# Patient Record
Sex: MALE | Race: BLACK OR AFRICAN AMERICAN | Employment: UNEMPLOYED | RURAL
[De-identification: names, ages, dates, MRNs, and addresses within clinical notes are randomized per-mention and may not be internally consistent; named-entity substitution may affect disease eponyms.]

---

## 2017-02-26 ENCOUNTER — TELEPHONE (OUTPATIENT)
Dept: FAMILY MEDICINE CLINIC | Age: 57
End: 2017-02-26

## 2017-02-26 RX ORDER — PENICILLIN V POTASSIUM 250 MG/1
500 TABLET, FILM COATED ORAL 2 TIMES DAILY
Qty: 28 TAB | Refills: 0 | Status: SHIPPED | OUTPATIENT
Start: 2017-02-26 | End: 2017-03-05

## 2017-02-26 NOTE — PROGRESS NOTES
Contacted by lupe. PT c/o left anterior lower jaw swelling, feels hard like a knot. No f/c, no swollen lymph nodes. Recommend starting pen VK and getting pt seen by dental. Called to JONNA.

## 2017-03-09 ENCOUNTER — OFFICE VISIT (OUTPATIENT)
Dept: FAMILY MEDICINE CLINIC | Age: 57
End: 2017-03-09

## 2017-03-09 VITALS
WEIGHT: 243 LBS | OXYGEN SATURATION: 96 % | HEART RATE: 94 BPM | TEMPERATURE: 96.7 F | DIASTOLIC BLOOD PRESSURE: 82 MMHG | RESPIRATION RATE: 16 BRPM | SYSTOLIC BLOOD PRESSURE: 120 MMHG | HEIGHT: 69 IN | BODY MASS INDEX: 35.99 KG/M2

## 2017-03-09 DIAGNOSIS — E11.9 CONTROLLED TYPE 2 DIABETES MELLITUS WITHOUT COMPLICATION, WITHOUT LONG-TERM CURRENT USE OF INSULIN (HCC): ICD-10-CM

## 2017-03-09 DIAGNOSIS — R20.0 NUMBNESS AND TINGLING OF RIGHT ARM: ICD-10-CM

## 2017-03-09 DIAGNOSIS — F33.0 DEPRESSION, MAJOR, RECURRENT, MILD (HCC): ICD-10-CM

## 2017-03-09 DIAGNOSIS — L73.1 INGROWING HAIR: ICD-10-CM

## 2017-03-09 DIAGNOSIS — E11.65 CONTROLLED TYPE 2 DIABETES MELLITUS WITH HYPERGLYCEMIA, WITH LONG-TERM CURRENT USE OF INSULIN (HCC): ICD-10-CM

## 2017-03-09 DIAGNOSIS — I10 ESSENTIAL HYPERTENSION: ICD-10-CM

## 2017-03-09 DIAGNOSIS — B19.20 HEPATITIS C VIRUS INFECTION, UNSPECIFIED CHRONICITY: ICD-10-CM

## 2017-03-09 DIAGNOSIS — F25.1 SCHIZOAFFECTIVE DISORDER, DEPRESSIVE TYPE (HCC): ICD-10-CM

## 2017-03-09 DIAGNOSIS — L02.02 BOIL, FACE: Primary | ICD-10-CM

## 2017-03-09 DIAGNOSIS — F20.3 UNDIFFERENTIATED SCHIZOPHRENIA (HCC): ICD-10-CM

## 2017-03-09 DIAGNOSIS — R20.2 NUMBNESS AND TINGLING OF RIGHT ARM: ICD-10-CM

## 2017-03-09 DIAGNOSIS — Z79.4 CONTROLLED TYPE 2 DIABETES MELLITUS WITH HYPERGLYCEMIA, WITH LONG-TERM CURRENT USE OF INSULIN (HCC): ICD-10-CM

## 2017-03-09 LAB
ALBUMIN UR QL STRIP: 150 MG/L
CREATININE, URINE POC: 300 MG/DL
MICROALBUMIN/CREAT RATIO POC: NORMAL MG/G

## 2017-03-09 RX ORDER — CEPHALEXIN 500 MG/1
1000 CAPSULE ORAL 2 TIMES DAILY
Qty: 28 CAP | Refills: 0 | Status: SHIPPED | OUTPATIENT
Start: 2017-03-09 | End: 2017-03-16

## 2017-03-09 RX ORDER — IRBESARTAN 300 MG/1
300 TABLET ORAL DAILY
Qty: 90 TAB | Refills: 2 | Status: SHIPPED | OUTPATIENT
Start: 2017-03-09 | End: 2017-04-07 | Stop reason: ALTCHOICE

## 2017-03-09 RX ORDER — ZIPRASIDONE HYDROCHLORIDE 20 MG/1
40 CAPSULE ORAL 2 TIMES DAILY WITH MEALS
Qty: 60 CAP | Refills: 11 | Status: SHIPPED | OUTPATIENT
Start: 2017-03-09 | End: 2017-04-07 | Stop reason: ALTCHOICE

## 2017-03-09 NOTE — PROGRESS NOTES
Gilford Eans is a 55 y.o. male presenting for/with:    Jaw Pain    HPI:  Knot on jaw  Got a little better with the penVK we ordered, but knot is still there and a little tender. No d/c, but has come to a small head    Hypertension. Blood pressures have been improving. Management at last visit included boosting regimen. Current regimen: ARB, CCB, thiazide. Symptoms include no sx Patient denies chest pain, palpitations, peripheral edema. Cough has resolved. Lab review:   Lab Results   Component Value Date/Time    Sodium 138 06/14/2016 11:40 AM    Potassium 4.5 06/14/2016 11:40 AM    Chloride 96 06/14/2016 11:40 AM    CO2 20 06/14/2016 11:40 AM    Anion gap 7 10/11/2015 09:37 PM    Glucose 199 06/14/2016 11:40 AM    BUN 17 06/14/2016 11:40 AM    Creatinine 0.95 06/14/2016 11:40 AM    BUN/Creatinine ratio 18 06/14/2016 11:40 AM    GFR est  06/14/2016 11:40 AM    GFR est non-AA 90 06/14/2016 11:40 AM    Calcium 9.8 06/14/2016 11:40 AM     Diabetes. Sugars controlled fairly Has been running low to mid 100's. We Boosted AM humalog mix 75/25 insulin to 50 units AM and con't  40 units QPM with dinner. Hypoglycemia: none  Tolerating current treatment well  Current medications include insulin, gluburide 2.5mg qam, metformin 1g BID, ASA 81mg every day.     Lab Results   Component Value Date/Time    Hemoglobin A1c 9.5 06/14/2016 11:40 AM    Hemoglobin A1c 6.3 01/29/2016 11:57 AM    Hemoglobin A1c 6.1 05/13/2015 05:55 AM    Glucose 199 06/14/2016 11:40 AM    Glucose (POC) 102 05/12/2015 04:33 PM    Glucose POC  12/16/2016 03:04 PM      Comment:      greater than 500    Microalb/Creat ratio (ug/mg creat.) 4.0 01/29/2016 11:58 AM    LDL, calculated 72.4 05/15/2015 05:14 AM    Creatinine 0.95 06/14/2016 11:40 AM     Lab Results   Component Value Date/Time    Microalb/Creat ratio (ug/mg creat.) 4.0 01/29/2016 11:58 AM    Microalbumin, urine 5.2 01/29/2016 11:58 AM     Last eye exam performed  greather than 1 year, currently incarcerated. Last foot exam  performed 12/2016, nl MF, no sores. Schizophrenia  We changed from zyprexa to geodon 20mg BID last visit due to very high sugar, in a cross titration, and added divalproex 250mg BID. Racquel well, and has been feeling better from sugar standpoint,but more depressed lately. Still hearing voices several days a week, sometimes telling him to hurt himself. Hep C POS  Lab Results   Component Value Date/Time    ALT (SGPT) 18 06/14/2016 11:40 AM    AST (SGOT) 10 06/14/2016 11:40 AM    Alk. phosphatase 85 06/14/2016 11:40 AM    Bilirubin, total <0.2 06/14/2016 11:40 AM     PMH, SH, Medications/Allergies: reviewed, on chart. ROS:  Constitutional: No fever, chills or weight loss  Respiratory: No cough, SOB   CV: No chest pain or Palpitations    Visit Vitals    /82 (BP 1 Location: Right arm, BP Patient Position: Sitting)    Pulse 94    Temp 96.7 °F (35.9 °C) (Temporal)    Resp 16    Ht 5' 9\" (1.753 m)    Wt 243 lb (110.2 kg)    SpO2 96%    BMI 35.88 kg/m2     Wt Readings from Last 3 Encounters:   03/09/17 243 lb (110.2 kg)   12/16/16 221 lb (100.2 kg)   06/14/16 224 lb (101.6 kg)     Physical Examination: General appearance - alert, well appearing, and in no distress  Mental status - alert, oriented to person, place, and time  Eyes - pupils equal and reactive, extraocular eye movements intact  ENT - bilateral external ears and nose normal. Normal lips. R lower cheek with a 1cm subcutaneous tender nodule with a small punctum present. No gum lesion or ttp to the gumline R lower jaw. Mostly edentulous.   Neck - supple, no significant adenopathy, no thyromegaly or mass  Lymphatics - no palpable lymphadenopathy, no hepatosplenomegaly  Chest - clear to auscultation, no wheezes, rales or rhonchi, symmetric air entry  Heart - normal rate, regular rhythm, normal S1, S2, no murmurs, rubs, clicks or gallops  Extremities - peripheral pulses normal, no pedal edema, no clubbing or cyanosis. A/P:  R cheek mass   Tender, swollen. Better after course of PCN. Has a draining punctum, so suspect a cyst or ingrowing hair with inflammation/infection. Try course of keflex    Schizophrenia  Not well controlled, with more voices. Boost geodon to 40mg BID. Check labs (LFT in CMP and CBC). DM2  Good control on sugar log. con't current regimen. Recheck A1c, deshawn/cr, and lytes and GFR in CMP. HTN  In goal. con't current tx. Hx hep c  Pt plans to address once his incarceration completes or if shows signs of active disease. LFT's ok on last check.

## 2017-03-09 NOTE — MR AVS SNAPSHOT
Visit Information Date & Time Provider Department Dept. Phone Encounter #  
 3/9/2017 10:50 AM Leopoldo Dandy, MD [address] [place] [address] [id number] Follow-up Instructions Return in about 3 months (around 6/9/2017). Follow-up and Disposition History Upcoming Health Maintenance Date Due  
 FOOT EXAM Q1 8/6/1971 EYE EXAM RETINAL OR DILATED Q1 8/6/1971 DTaP/Tdap/Td series (1 - Tdap) 8/6/1982 FOBT Q 1 YEAR AGE 50-75 8/6/2011 LIPID PANEL Q1 5/15/2016 INFLUENZA AGE 9 TO ADULT 8/1/2016 HEMOGLOBIN A1C Q6M 12/14/2016 MICROALBUMIN Q1 1/29/2017 Allergies as of 3/9/2017  Review Complete On: 3/9/2017 By: Leopoldo Dandy, MD  
  
 Severity Noted Reaction Type Reactions Lisinopril  05/12/2015   Side Effect Cough Developed cough while taking lisinopril Current Immunizations  Never Reviewed Name Date Influenza Vaccine PF 12/2/2014  9:02 AM  
 Pneumococcal Polysaccharide (PPSV-23) 12/2/2014  8:59 AM  
  
 Not reviewed this visit You Were Diagnosed With   
  
 Codes Comments Boil, face    -  Primary ICD-10-CM: L02.02 
ICD-9-CM: 680.0 Depression, major, recurrent, mild ([facility]     ICD-10-CM: F33.0 ICD-9-CM: 296.31 Controlled type 2 diabetes mellitus with hyperglycemia, with long-term current use of insulin (HCC)     ICD-10-CM: E11.65, Z79.4 ICD-9-CM: 250.80, 790.29, V58.67 Essential hypertension     ICD-10-CM: I10 
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  
 35.88 kg/m 2 2.32 m 2 Preferred Pharmacy Pharmacy Name Phone [phone] [place], [place] - [address] Your Updated Medication List  
  
   
This list is accurate as of: 3/9/17 12:57 PM.  Always use your most recent med list. amLODIPine 5 mg tablet Commonly known as:  Tad Mee Take 1 Tab by mouth daily. Indications: pressure  
  
 aspirin delayed-release 81 mg tablet Take 1 Tab by mouth daily. Indications: prevent heart problem  
  
 cephALEXin 500 mg capsule Commonly known as:  Juanetta Muzzy Take 2 Caps by mouth two (2) times a day for 7 days. Indications: cheek bump, ingrown hair  
  
 clotrimazole 1 % topical cream  
Commonly known as:  Sadie Heads Apply  to affected area two (2) times a day. divalproex  mg tablet Commonly known as:  DEPAKOTE Take 1 Tab by mouth two (2) times a day. Indications: nerves  
  
 esomeprazole 40 mg capsule Commonly known as:  NEXIUM  
TAKE 1 CAP BY MOUTH DAILY. gabapentin 300 mg capsule Commonly known as:  NEURONTIN Take 1 Cap by mouth three (3) times daily. 2 tabs BID  Indications: nerve pain  
  
 glyBURIDE 2.5 mg tablet Commonly known as:  Jasmyne Mate Take 1 Tab by mouth Daily (before breakfast). Indications: sugar  
  
 hydroCHLOROthiazide 12.5 mg tablet Commonly known as:  HYDRODIURIL Take 1 Tab by mouth daily. Indications: pressure and fluid, add to amlodipine and irbesartan Insulin Lisp & Lisp Prot (Hum) 100 unit/mL (75-25) Inpn Commonly known as:  HumaLOG Mix 75-25 KwikPen 48 units AM and 40 units PM  
  
 Insulin Needles (Disposable) 31 gauge x 5/16\" Ndle Use to inject insulin twice daily  
  
 irbesartan 300 mg tablet Commonly known as:  AVAPRO Take 1 Tab by mouth daily. metFORMIN 1,000 mg tablet Commonly known as:  GLUCOPHAGE Take 1 Tab by mouth two (2) times daily (with meals).  Indications: type 2 diabetes mellitus  
  
 traZODone 50 mg tablet Commonly known as:  Nolberto Mel Take 1 Tab by mouth nightly. Indications: sleep  
  
 ziprasidone 20 mg capsule Commonly known as:  Faviola Mayers Take 2 Caps by mouth two (2) times daily (with meals). Indications: nerves, new higher dose Prescriptions Sent to Pharmacy Refills  
 irbesartan (AVAPRO) 300 mg tablet 2 Sig: Take 1 Tab by mouth daily. Class: Normal  
 [facility] Ph #: [phone] Route: Oral  
 ziprasidone (GEODON) 20 mg capsule 11 Sig: Take 2 Caps by mouth two (2) times daily (with meals). Indications: nerves, new higher dose Class: Normal  
 [facility] Ph #: [phone] Route: Oral  
 cephALEXin (KEFLEX) 500 mg capsule 0 Sig: Take 2 Caps by mouth two (2) times a day for 7 days. Indications: cheek bump, ingrown hair Class: Normal  
 [facility] Ph #: [phone] Route: Oral  
  
We Performed the Following AMB POC URINE, MICROALBUMIN, SEMIQUANT (3 RESULTS) [15404 CPT(R)] CBC WITH AUTOMATED DIFF [87259 CPT(R)] COLLECTION VENOUS BLOOD,VENIPUNCTURE [id number] CPT(R)] HEMOGLOBIN A1C WITH EAG [24514 CPT(R)] METABOLIC PANEL, COMPREHENSIVE [37631 CPT(R)] Follow-up Instructions Return in about 3 months (around 6/9/2017). Introducing [facility] & HEALTH SERVICES! Melissa Rose introduces [employer] patient portal. Now you can access parts of your medical record, email your doctor's office, and request medication refills online. 1. In your internet browser, go to [email or web address] 2. Click on the First Time User? Click Here link in the Sign In box. You will see the New Member Sign Up page. 3. Enter your [employer]PRINT Access Code exactly as it appears below.  You will not need to use this code after youve completed the sign-up process. If you do not sign up before the expiration date, you must request a new code. · [employer] Access Code: [email or web address] Expires: 3/16/2017  1:26 PM 
 
4. Enter the last four digits of your Social Security Number (xxxx) and Date of Birth (mm/dd/yyyy) as indicated and click Submit. You will be taken to the next sign-up page. 5. Create a [employer] ID. This will be your [employer] login ID and cannot be changed, so think of one that is secure and easy to remember. 6. Create a [employer] password. You can change your password at any time. 7. Enter your Password Reset Question and Answer. This can be used at a later time if you forget your password. 8. Enter your e-mail address. You will receive e-mail notification when new information is available in [address] 9. Click Sign Up. You can now view and download portions of your medical record. 10. Click the Download Summary menu link to download a portable copy of your medical information. If you have questions, please visit the Frequently Asked Questions section of the [employer] website. Remember, [employer] is NOT to be used for urgent needs. For medical emergencies, dial 911. Now available from your iPhone and Android! Please provide this summary of care documentation to your next provider. Your primary care clinician is listed as John Paul Vazquez. If you have any questions after today's visit, please call [phone].

## 2017-03-10 LAB
ALBUMIN SERPL-MCNC: 4.7 G/DL (ref 3.5–5.5)
ALBUMIN/GLOB SERPL: 1.7 {RATIO} (ref 1.1–2.5)
ALP SERPL-CCNC: 44 IU/L (ref 39–117)
ALT SERPL-CCNC: 19 IU/L (ref 0–44)
AST SERPL-CCNC: 15 IU/L (ref 0–40)
BASOPHILS # BLD AUTO: 0.1 X10E3/UL (ref 0–0.2)
BASOPHILS NFR BLD AUTO: 1 %
BILIRUB SERPL-MCNC: <0.2 MG/DL (ref 0–1.2)
BUN SERPL-MCNC: 19 MG/DL (ref 6–24)
BUN/CREAT SERPL: 18 (ref 9–20)
CALCIUM SERPL-MCNC: 9.6 MG/DL (ref 8.7–10.2)
CHLORIDE SERPL-SCNC: 99 MMOL/L (ref 96–106)
CO2 SERPL-SCNC: 18 MMOL/L (ref 18–29)
CREAT SERPL-MCNC: 1.03 MG/DL (ref 0.76–1.27)
EOSINOPHIL # BLD AUTO: 0.3 X10E3/UL (ref 0–0.4)
EOSINOPHIL NFR BLD AUTO: 4 %
ERYTHROCYTE [DISTWIDTH] IN BLOOD BY AUTOMATED COUNT: 13.8 % (ref 12.3–15.4)
EST. AVERAGE GLUCOSE BLD GHB EST-MCNC: 214 MG/DL
GLOBULIN SER CALC-MCNC: 2.7 G/DL (ref 1.5–4.5)
GLUCOSE SERPL-MCNC: 72 MG/DL (ref 65–99)
HBA1C MFR BLD: 9.1 % (ref 4.8–5.6)
HCT VFR BLD AUTO: 39 % (ref 37.5–51)
HGB BLD-MCNC: 13.6 G/DL (ref 12.6–17.7)
IMM GRANULOCYTES # BLD: 0 X10E3/UL (ref 0–0.1)
IMM GRANULOCYTES NFR BLD: 0 %
LYMPHOCYTES # BLD AUTO: 3.6 X10E3/UL (ref 0.7–3.1)
LYMPHOCYTES NFR BLD AUTO: 51 %
MCH RBC QN AUTO: 28.8 PG (ref 26.6–33)
MCHC RBC AUTO-ENTMCNC: 34.9 G/DL (ref 31.5–35.7)
MCV RBC AUTO: 83 FL (ref 79–97)
MONOCYTES # BLD AUTO: 0.6 X10E3/UL (ref 0.1–0.9)
MONOCYTES NFR BLD AUTO: 8 %
MORPHOLOGY BLD-IMP: ABNORMAL
NEUTROPHILS # BLD AUTO: 2.6 X10E3/UL (ref 1.4–7)
NEUTROPHILS NFR BLD AUTO: 36 %
PLATELET # BLD AUTO: 243 X10E3/UL (ref 150–379)
POTASSIUM SERPL-SCNC: 4.1 MMOL/L (ref 3.5–5.2)
PROT SERPL-MCNC: 7.4 G/DL (ref 6–8.5)
RBC # BLD AUTO: 4.73 X10E6/UL (ref 4.14–5.8)
SODIUM SERPL-SCNC: 141 MMOL/L (ref 134–144)
WBC # BLD AUTO: 7.1 X10E3/UL (ref 3.4–10.8)

## 2017-04-07 RX ORDER — VALSARTAN AND HYDROCHLOROTHIAZIDE 80; 12.5 MG/1; MG/1
1 TABLET, FILM COATED ORAL DAILY
Qty: 30 TAB | Refills: 6 | Status: SHIPPED | OUTPATIENT
Start: 2017-04-07 | End: 2017-07-26 | Stop reason: DRUGHIGH

## 2017-04-07 RX ORDER — METFORMIN HYDROCHLORIDE 1000 MG/1
1000 TABLET ORAL 2 TIMES DAILY WITH MEALS
Qty: 60 TAB | Refills: 5 | Status: SHIPPED | OUTPATIENT
Start: 2017-04-07 | End: 2017-10-17 | Stop reason: SDUPTHER

## 2017-04-07 RX ORDER — OLANZAPINE 10 MG/1
10 TABLET ORAL
Qty: 30 TAB | Refills: 5 | Status: SHIPPED | OUTPATIENT
Start: 2017-04-07 | End: 2017-10-05 | Stop reason: SDUPTHER

## 2017-04-07 RX ORDER — GABAPENTIN 300 MG/1
300 CAPSULE ORAL 3 TIMES DAILY
Qty: 90 CAP | Refills: 5 | Status: SHIPPED | OUTPATIENT
Start: 2017-04-07 | End: 2017-10-11 | Stop reason: SDUPTHER

## 2017-04-07 RX ORDER — DIPHENHYDRAMINE HCL 50 MG
50 CAPSULE ORAL
Qty: 30 CAP | Refills: 6 | Status: SHIPPED | OUTPATIENT
Start: 2017-04-07 | End: 2017-04-12 | Stop reason: SDUPTHER

## 2017-04-12 DIAGNOSIS — F25.1 SCHIZOAFFECTIVE DISORDER, DEPRESSIVE TYPE (HCC): ICD-10-CM

## 2017-04-12 DIAGNOSIS — R20.2 NUMBNESS AND TINGLING OF RIGHT ARM: ICD-10-CM

## 2017-04-12 DIAGNOSIS — R20.0 NUMBNESS AND TINGLING OF RIGHT ARM: ICD-10-CM

## 2017-04-12 RX ORDER — DIPHENHYDRAMINE HCL 50 MG
50 CAPSULE ORAL
Qty: 30 CAP | Refills: 6 | Status: SHIPPED | OUTPATIENT
Start: 2017-04-12 | End: 2018-01-08 | Stop reason: SDUPTHER

## 2017-06-02 DIAGNOSIS — I10 ESSENTIAL HYPERTENSION: ICD-10-CM

## 2017-06-02 DIAGNOSIS — F33.0 DEPRESSION, MAJOR, RECURRENT, MILD (HCC): ICD-10-CM

## 2017-06-02 RX ORDER — DIVALPROEX SODIUM 250 MG/1
250 TABLET, DELAYED RELEASE ORAL 2 TIMES DAILY
Qty: 60 TAB | Refills: 5 | Status: SHIPPED | OUTPATIENT
Start: 2017-06-02 | End: 2017-11-28 | Stop reason: SDUPTHER

## 2017-06-02 RX ORDER — AMLODIPINE BESYLATE 5 MG/1
5 TABLET ORAL DAILY
Qty: 30 TAB | Refills: 6 | Status: SHIPPED | OUTPATIENT
Start: 2017-06-02 | End: 2017-12-26 | Stop reason: SDUPTHER

## 2017-07-26 ENCOUNTER — TELEPHONE (OUTPATIENT)
Dept: FAMILY MEDICINE CLINIC | Age: 57
End: 2017-07-26

## 2017-07-26 DIAGNOSIS — I10 ESSENTIAL HYPERTENSION: ICD-10-CM

## 2017-07-26 DIAGNOSIS — E11.65 CONTROLLED TYPE 2 DIABETES MELLITUS WITH HYPERGLYCEMIA, WITH LONG-TERM CURRENT USE OF INSULIN (HCC): Primary | ICD-10-CM

## 2017-07-26 DIAGNOSIS — Z79.4 CONTROLLED TYPE 2 DIABETES MELLITUS WITH HYPERGLYCEMIA, WITH LONG-TERM CURRENT USE OF INSULIN (HCC): Primary | ICD-10-CM

## 2017-07-26 RX ORDER — VALSARTAN AND HYDROCHLOROTHIAZIDE 160; 12.5 MG/1; MG/1
1 TABLET, FILM COATED ORAL DAILY
Qty: 30 TAB | Refills: 11 | Status: SHIPPED | OUTPATIENT
Start: 2017-07-26 | End: 2020-04-01 | Stop reason: DRUGHIGH

## 2017-07-26 NOTE — TELEPHONE ENCOUNTER
Simeon pt. Sugar log reviewed. High. Boost insulin to 47 units AM and 42 units PM. Pressure up. Boost valsartan HCT to 160/12.5 daily.

## 2017-10-11 DIAGNOSIS — E11.9 CONTROLLED TYPE 2 DIABETES MELLITUS WITHOUT COMPLICATION, WITHOUT LONG-TERM CURRENT USE OF INSULIN (HCC): ICD-10-CM

## 2017-10-11 DIAGNOSIS — Z79.4 CONTROLLED TYPE 2 DIABETES MELLITUS WITH HYPERGLYCEMIA, WITH LONG-TERM CURRENT USE OF INSULIN (HCC): Primary | ICD-10-CM

## 2017-10-11 DIAGNOSIS — B19.20 HEPATITIS C VIRUS INFECTION, UNSPECIFIED CHRONICITY: ICD-10-CM

## 2017-10-11 DIAGNOSIS — R20.2 NUMBNESS AND TINGLING OF RIGHT ARM: ICD-10-CM

## 2017-10-11 DIAGNOSIS — R20.0 NUMBNESS AND TINGLING OF RIGHT ARM: ICD-10-CM

## 2017-10-11 DIAGNOSIS — E11.65 CONTROLLED TYPE 2 DIABETES MELLITUS WITH HYPERGLYCEMIA, WITH LONG-TERM CURRENT USE OF INSULIN (HCC): Primary | ICD-10-CM

## 2017-10-12 ENCOUNTER — TELEPHONE (OUTPATIENT)
Dept: FAMILY MEDICINE CLINIC | Age: 57
End: 2017-10-12

## 2017-10-12 DIAGNOSIS — Z79.4 CONTROLLED TYPE 2 DIABETES MELLITUS WITH HYPERGLYCEMIA, WITH LONG-TERM CURRENT USE OF INSULIN (HCC): ICD-10-CM

## 2017-10-12 DIAGNOSIS — E11.65 CONTROLLED TYPE 2 DIABETES MELLITUS WITH HYPERGLYCEMIA, WITH LONG-TERM CURRENT USE OF INSULIN (HCC): ICD-10-CM

## 2017-10-12 RX ORDER — GABAPENTIN 300 MG/1
CAPSULE ORAL
Qty: 90 CAP | Refills: 5 | Status: SHIPPED | OUTPATIENT
Start: 2017-10-12 | End: 2017-11-10 | Stop reason: SDUPTHER

## 2017-10-12 NOTE — TELEPHONE ENCOUNTER
BP's and sugars reviewed. Most pressures in 556'Q systolic, pulses in the low 100's. Sugars high though, at low 200's in AM and high 200's to low 400's PM. Boost AM 70/30 insulin to 52 units and PM to 46 units.

## 2017-10-17 RX ORDER — METFORMIN HYDROCHLORIDE 1000 MG/1
1000 TABLET ORAL 2 TIMES DAILY WITH MEALS
Qty: 60 TAB | Refills: 5 | Status: SHIPPED | OUTPATIENT
Start: 2017-10-17 | End: 2020-03-02 | Stop reason: SDUPTHER

## 2017-11-10 ENCOUNTER — TELEPHONE (OUTPATIENT)
Dept: FAMILY MEDICINE CLINIC | Age: 57
End: 2017-11-10

## 2017-11-10 DIAGNOSIS — R20.2 NUMBNESS AND TINGLING OF RIGHT ARM: Primary | ICD-10-CM

## 2017-11-10 DIAGNOSIS — R20.0 NUMBNESS AND TINGLING OF RIGHT ARM: Primary | ICD-10-CM

## 2017-11-10 DIAGNOSIS — B19.20 HEPATITIS C VIRUS INFECTION, UNSPECIFIED CHRONICITY: ICD-10-CM

## 2017-11-10 RX ORDER — GABAPENTIN 300 MG/1
CAPSULE ORAL
Qty: 90 CAP | Refills: 5 | Status: SHIPPED | OUTPATIENT
Start: 2017-11-10 | End: 2018-02-05 | Stop reason: SDUPTHER

## 2017-11-28 DIAGNOSIS — F33.0 DEPRESSION, MAJOR, RECURRENT, MILD (HCC): ICD-10-CM

## 2017-11-28 RX ORDER — DIVALPROEX SODIUM 250 MG/1
TABLET, EXTENDED RELEASE ORAL
Qty: 60 TAB | Refills: 6 | Status: SHIPPED | OUTPATIENT
Start: 2017-11-28 | End: 2020-01-04

## 2017-11-29 ENCOUNTER — OFFICE VISIT (OUTPATIENT)
Dept: FAMILY MEDICINE CLINIC | Age: 57
End: 2017-11-29

## 2017-11-29 VITALS
SYSTOLIC BLOOD PRESSURE: 112 MMHG | HEART RATE: 99 BPM | TEMPERATURE: 98.2 F | DIASTOLIC BLOOD PRESSURE: 80 MMHG | BODY MASS INDEX: 37.92 KG/M2 | HEIGHT: 69 IN | OXYGEN SATURATION: 95 % | WEIGHT: 256 LBS

## 2017-11-29 DIAGNOSIS — I10 ESSENTIAL HYPERTENSION: ICD-10-CM

## 2017-11-29 DIAGNOSIS — H00.12 CHALAZION OF RIGHT LOWER EYELID: Primary | ICD-10-CM

## 2017-11-29 DIAGNOSIS — Z79.4 CONTROLLED TYPE 2 DIABETES MELLITUS WITHOUT COMPLICATION, WITH LONG-TERM CURRENT USE OF INSULIN (HCC): ICD-10-CM

## 2017-11-29 DIAGNOSIS — E78.2 MIXED HYPERLIPIDEMIA: ICD-10-CM

## 2017-11-29 DIAGNOSIS — E11.9 CONTROLLED TYPE 2 DIABETES MELLITUS WITHOUT COMPLICATION, WITH LONG-TERM CURRENT USE OF INSULIN (HCC): ICD-10-CM

## 2017-11-29 RX ORDER — OFLOXACIN 3 MG/ML
2 SOLUTION/ DROPS OPHTHALMIC 4 TIMES DAILY
Qty: 5 ML | Refills: 1 | Status: SHIPPED | OUTPATIENT
Start: 2017-11-29 | End: 2017-12-06

## 2017-11-29 NOTE — MR AVS SNAPSHOT
Visit Information Date & Time Provider Department Dept. Phone Encounter #  
 11/29/2017  2:40 PM Meenakshi Jeffries MD [address] [place] [address] [id number] Follow-up Instructions Return in about 6 months (around 5/29/2018). Follow-up and Disposition History Upcoming Health Maintenance Date Due  
 FOOT EXAM Q1 8/6/1971 EYE EXAM RETINAL OR DILATED Q1 8/6/1971 DTaP/Tdap/Td series (1 - Tdap) 8/6/1982 FOBT Q 1 YEAR AGE 50-75 8/6/2011 LIPID PANEL Q1 5/15/2016 Influenza Age 5 to Adult 8/1/2017 HEMOGLOBIN A1C Q6M 9/9/2017 MICROALBUMIN Q1 3/9/2018 Allergies as of 11/29/2017  Review Complete On: 11/29/2017 By: Joshua Benton LPN Severity Noted Reaction Type Reactions Lisinopril  05/12/2015   Side Effect Cough Developed cough while taking lisinopril Current Immunizations  Never Reviewed Name Date Influenza Vaccine PF 12/2/2014  9:02 AM  
 Pneumococcal Polysaccharide (PPSV-23) 12/2/2014  8:59 AM  
  
 Not reviewed this visit You Were Diagnosed With   
  
 Codes Comments Chalazion of right lower eyelid    -  Primary ICD-10-CM: H00.12 ICD-9-CM: 373.2 Essential hypertension     ICD-10-CM: I10 
ICD-9-CM: 401.9 Controlled type 2 diabetes mellitus without complication, with long-term current use of insulin (HCC)     ICD-10-CM: E11.9, Z79.4 ICD-9-CM: 250.00, V58.67 Mixed hyperlipidemia     ICD-10-CM: E78.2 ICD-9-CM: 272.2 Vitals BP Pulse Temp Height(growth percentile) Weight(growth percentile) SpO2  
 112/80 99 98.2 °F (36.8 °C) (Oral) 5' 9\" (1.753 m) 256 lb (116.1 kg) 95% BMI Smoking Status 37.8 kg/m2 Former Smoker BMI and BSA Data Body Mass Index Body Surface Area  
 37.8 kg/m 2 2.38 m 2 Preferred Pharmacy Pharmacy Name Phone [phone] [place], [place] - [address] Your Updated Medication List  
  
   
 This list is accurate as of: 11/29/17  3:11 PM.  Always use your most recent med list. amLODIPine 5 mg tablet Commonly known as:  Elyn Fawader Take 1 Tab by mouth daily. Indications: pressure  
  
 aspirin delayed-release 81 mg tablet Take 1 Tab by mouth daily. Indications: prevent heart problem  
  
 clotrimazole 1 % topical cream  
Commonly known as:  Precilla Nena Apply  to affected area two (2) times a day. diphenhydrAMINE 50 mg capsule Commonly known as:  BENADRYL Take 1 Cap by mouth nightly. Indications: sleep  
  
 divalproex  mg ER tablet Commonly known as:  DEPAKOTE ER  
TAKE 1 TAB BY MOUTH TWO (2) TIMES A DAY. INDICATIONS: NERVES  
  
 esomeprazole 40 mg capsule Commonly known as:  NEXIUM  
TAKE 1 CAP BY MOUTH DAILY. gabapentin 300 mg capsule Commonly known as:  NEURONTIN  
TAKE 1 CAP BY MOUTH THREE (3) TIMES DAILY. INDICATIONS: NERVE PAIN Insulin Needles (Disposable) 31 gauge x 5/16\" Ndle Use to inject insulin twice daily  
  
 insulin NPH/insulin regular 100 unit/mL (70-30) injection Commonly known as:  NOVOLIN 70/30, HUMULIN 70/30  
52 units AM and 46 units with dinner for sugar diabetes. metFORMIN 1,000 mg tablet Commonly known as:  GLUCOPHAGE Take 1 Tab by mouth two (2) times daily (with meals). Indications: type 2 diabetes mellitus  
  
 ofloxacin 0.3 % ophthalmic solution Commonly known as:  FLOXIN Administer 2 Drops to right eye four (4) times daily for 7 days. Indications: infected cyst  
  
 OLANZapine 10 mg tablet Commonly known as:  ZyPREXA  
TAKE 1 TAB BY MOUTH NIGHTLY. INDICATIONS: NERVES/SCHIZOAFFECTIVE  
  
 valsartan-hydroCHLOROthiazide 160-12.5 mg per tablet Commonly known as:  DIOVAN-HCT Take 1 Tab by mouth daily. Prescriptions Sent to Pharmacy Refills  
 ofloxacin (FLOXIN) 0.3 % ophthalmic solution 1 Sig: Administer 2 Drops to right eye four (4) times daily for 7 days. Indications: infected cyst  
 Class: Normal  
 [facility]  #: [phone] Route: Right Eye We Performed the Following HEMOGLOBIN A1C WITH EAG [02079 CPT(R)]  DIABETES FOOT EXAM [HM7 Custom] LIPID PANEL [82260 CPT(R)] METABOLIC PANEL, BASIC [59673 CPT(R)] Follow-up Instructions Return in about 6 months (around 5/29/2018). Patient Instructions If you have any questions regarding Shipping Easy, you may call Shipping Easy support at [phone]. Introducing [facility] & HEALTH SERVICES! [employer] introduces [employer] patient portal. Now you can access parts of your medical record, email your doctor's office, and request medication refills online. 1. In your internet browser, go to [email or web address] 2. Click on the First Time User? Click Here link in the Sign In box. You will see the New Member Sign Up page. 3. Enter your [employer] Access Code exactly as it appears below. You will not need to use this code after youve completed the sign-up process. If you do not sign up before the expiration date, you must request a new code. · [employer] Access Code: [email or web address] Expires: 2/27/2018  3:09 PM 
 
4. Enter the last four digits of your Social Security Number (xxxx) and Date of Birth (mm/dd/yyyy) as indicated and click Submit. You will be taken to the next sign-up page. 5. Create a [employer] ID. This will be your [employer] login ID and cannot be changed, so think of one that is secure and easy to remember. 6. Create a [employer] password. You can change your password at any time. 7. Enter your Password Reset Question and Answer. This can be used at a later time if you forget your password. 8. Enter your e-mail address. You will receive e-mail notification when new information is available in [address] 9. Click Sign Up. You can now view and download portions of your medical record. 10. Click the Download Summary menu link to download a portable copy of your medical information. If you have questions, please visit the Frequently Asked Questions section of the [email or web address] website. Remember, [email or web address] is NOT to be used for urgent needs. For medical emergencies, dial 911. Now available from your iPhone and Android! Please provide this summary of care documentation to your next provider. Your primary care clinician is listed as Mabel Vazquez. If you have any questions after today's visit, please call [phone].

## 2017-11-30 LAB
BUN SERPL-MCNC: 14 MG/DL (ref 6–24)
BUN/CREAT SERPL: 15 (ref 9–20)
CALCIUM SERPL-MCNC: 10 MG/DL (ref 8.7–10.2)
CHLORIDE SERPL-SCNC: 96 MMOL/L (ref 96–106)
CHOLEST SERPL-MCNC: 219 MG/DL (ref 100–199)
CO2 SERPL-SCNC: 23 MMOL/L (ref 18–29)
CREAT SERPL-MCNC: 0.94 MG/DL (ref 0.76–1.27)
EST. AVERAGE GLUCOSE BLD GHB EST-MCNC: 229 MG/DL
GFR SERPLBLD CREATININE-BSD FMLA CKD-EPI: 104 ML/MIN/1.73
GFR SERPLBLD CREATININE-BSD FMLA CKD-EPI: 90 ML/MIN/1.73
GLUCOSE SERPL-MCNC: 145 MG/DL (ref 65–99)
HBA1C MFR BLD: 9.6 % (ref 4.8–5.6)
HDLC SERPL-MCNC: 30 MG/DL
LDLC SERPL CALC-MCNC: ABNORMAL MG/DL (ref 0–99)
POTASSIUM SERPL-SCNC: 3.9 MMOL/L (ref 3.5–5.2)
SODIUM SERPL-SCNC: 140 MMOL/L (ref 134–144)
TRIGL SERPL-MCNC: 453 MG/DL (ref 0–149)
VLDLC SERPL CALC-MCNC: ABNORMAL MG/DL (ref 5–40)

## 2017-11-30 NOTE — PROGRESS NOTES
Sugar not so goo. Boost insulin to 54 units AM and PM to 48 units. alf informed by [employer].
01/29/2016 11:58 AM     Last eye exam performed  More than 1 year ago. Currently incarcerated. Last foot exam performed 1 year ago. PMH, SH, Medications/Allergies: reviewed, on chart. ROS:  Constitutional: No fever, chills or weight loss  Respiratory: No cough, SOB   CV: No chest pain or Palpitations    Visit Vitals    /80  Comment: large cuff    Pulse 99    Temp 98.2 °F (36.8 °C) (Oral)    Ht 5' 9\" (1.753 m)    Wt 256 lb (116.1 kg)    SpO2 95%    BMI 37.8 kg/m2     Wt Readings from Last 3 Encounters:   11/29/17 256 lb (116.1 kg)   03/09/17 243 lb (110.2 kg)   12/16/16 221 lb (100.2 kg)   +13#  Physical Examination: General appearance - alert, well appearing, and in no distress  Mental status - alert, oriented to person, place, and time  Eyes - pupils equal and reactive, extraocular eye movements intact. L lower eyelid with 2mm tender papule just lateral to the tear duct, mildly erythematous, no d/c, no injection to conjunctiva. ENT - bilateral external ears and nose normal. Normal lips  Neck - supple, no significant adenopathy, no thyromegaly or mass  Lymphatics - no palpable lymphadenopathy, no hepatosplenomegaly  Chest - clear to auscultation, no wheezes, rales or rhonchi, symmetric air entry  Heart - normal rate, regular rhythm, normal S1, S2, no murmurs, rubs, clicks or gallops  Extremities - peripheral pulses normal, no pedal edema, no clubbing or cyanosis    A/P:  R lower inner lid with irritated chalazion. tx with floxin opthalmic 2gtts QID    DM2  wc by sugars. Check A1c. Adjust PRN    HTN  well controlled. con't current tx.  Check labs    HLD  Check lipids, adjust PRN

## 2017-12-26 ENCOUNTER — TELEPHONE (OUTPATIENT)
Dept: FAMILY MEDICINE CLINIC | Age: 57
End: 2017-12-26

## 2017-12-26 DIAGNOSIS — I10 ESSENTIAL HYPERTENSION: Primary | ICD-10-CM

## 2017-12-26 RX ORDER — AMLODIPINE BESYLATE 5 MG/1
5 TABLET ORAL DAILY
Qty: 30 TAB | Refills: 6 | Status: SHIPPED | OUTPATIENT
Start: 2017-12-26 | End: 2020-05-22 | Stop reason: SDUPTHER

## 2018-01-08 ENCOUNTER — TELEPHONE (OUTPATIENT)
Dept: FAMILY MEDICINE CLINIC | Age: 58
End: 2018-01-08

## 2018-01-08 DIAGNOSIS — F33.0 DEPRESSION, MAJOR, RECURRENT, MILD (HCC): Primary | ICD-10-CM

## 2018-01-08 DIAGNOSIS — R20.2 NUMBNESS AND TINGLING OF RIGHT ARM: ICD-10-CM

## 2018-01-08 DIAGNOSIS — R20.0 NUMBNESS AND TINGLING OF RIGHT ARM: ICD-10-CM

## 2018-01-08 DIAGNOSIS — F25.1 SCHIZOAFFECTIVE DISORDER, DEPRESSIVE TYPE (HCC): ICD-10-CM

## 2018-01-08 RX ORDER — DIPHENHYDRAMINE HCL 50 MG
50 CAPSULE ORAL
Qty: 30 CAP | Refills: 6 | Status: SHIPPED | OUTPATIENT
Start: 2018-01-08 | End: 2020-01-04

## 2018-02-05 ENCOUNTER — TELEPHONE (OUTPATIENT)
Dept: FAMILY MEDICINE CLINIC | Age: 58
End: 2018-02-05

## 2018-02-05 DIAGNOSIS — R20.0 NUMBNESS AND TINGLING OF RIGHT ARM: ICD-10-CM

## 2018-02-05 DIAGNOSIS — R20.2 NUMBNESS AND TINGLING OF RIGHT ARM: ICD-10-CM

## 2018-02-05 DIAGNOSIS — F25.1 SCHIZOAFFECTIVE DISORDER, DEPRESSIVE TYPE (HCC): Primary | ICD-10-CM

## 2018-02-05 DIAGNOSIS — B19.20 HEPATITIS C VIRUS INFECTION, UNSPECIFIED CHRONICITY: ICD-10-CM

## 2018-02-05 DIAGNOSIS — F33.0 DEPRESSION, MAJOR, RECURRENT, MILD (HCC): ICD-10-CM

## 2018-02-05 RX ORDER — OLANZAPINE 10 MG/1
TABLET ORAL
Qty: 30 TAB | Refills: 5 | Status: SHIPPED | OUTPATIENT
Start: 2018-02-05 | End: 2020-05-22 | Stop reason: ALTCHOICE

## 2018-02-05 RX ORDER — GABAPENTIN 300 MG/1
CAPSULE ORAL
Qty: 120 CAP | Refills: 5 | Status: SHIPPED | OUTPATIENT
Start: 2018-02-05 | End: 2020-03-02 | Stop reason: DRUGHIGH

## 2018-02-05 NOTE — TELEPHONE ENCOUNTER
Simeon pt. Needs RF olanzapine and gabapentin. Racquel well, working well. Also boosting gabapentin up. RF sent.

## 2019-05-22 ENCOUNTER — APPOINTMENT (OUTPATIENT)
Dept: CT IMAGING | Age: 59
End: 2019-05-22
Attending: EMERGENCY MEDICINE
Payer: MEDICARE

## 2019-05-22 ENCOUNTER — HOSPITAL ENCOUNTER (EMERGENCY)
Age: 59
Discharge: HOME | End: 2019-05-23
Attending: EMERGENCY MEDICINE
Payer: MEDICARE

## 2019-05-22 VITALS
DIASTOLIC BLOOD PRESSURE: 72 MMHG | RESPIRATION RATE: 16 BRPM | SYSTOLIC BLOOD PRESSURE: 128 MMHG | HEART RATE: 86 BPM | OXYGEN SATURATION: 99 % | TEMPERATURE: 98 F
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
## 2019-05-22 DIAGNOSIS — S20.221A CONTUSION OF RIGHT SIDE OF BACK, INITIAL ENCOUNTER: ICD-10-CM

## 2019-05-22 DIAGNOSIS — F10.10 ALCOHOL ABUSE: ICD-10-CM

## 2019-05-22 LAB
GLUCOSE BLD STRIP.AUTO-MCNC: 101 MG/DL (ref 65–100)
SERVICE CMNT-IMP: ABNORMAL

## 2019-05-22 PROCEDURE — 72192 CT PELVIS W/O DYE: CPT

## 2019-05-22 PROCEDURE — 99284 EMERGENCY DEPT VISIT MOD MDM: CPT

## 2019-05-22 PROCEDURE — 82962 GLUCOSE BLOOD TEST: CPT

## 2019-05-22 PROCEDURE — 70450 CT HEAD/BRAIN W/O DYE: CPT

## 2019-05-22 PROCEDURE — 72131 CT LUMBAR SPINE W/O DYE: CPT

## 2019-05-22 PROCEDURE — 72125 CT NECK SPINE W/O DYE: CPT

## 2019-05-23 RX ORDER — IBUPROFEN 800 MG/1
800 TABLET ORAL
Qty: 30 TAB | Refills: 0 | Status: SHIPPED | OUTPATIENT
Start: 2019-05-23 | End: 2020-03-18 | Stop reason: SDUPTHER

## 2019-05-23 NOTE — DISCHARGE INSTRUCTIONS
Patient Education        Acute Alcohol Intoxication: Care Instructions  Your Care Instructions    You have had treatment to help your body rid itself of alcohol. Too much alcohol upsets the body's fluid balance. Your doctor may have given you fluids and vitamins. For some people, drinking too much alcohol is a one-time event. For others, it is an ongoing problem. In either case, it is serious. It can be life-threatening. Follow-up care is a key part of your treatment and safety. Be sure to make and go to all appointments, and call your doctor if you are having problems. It's also a good idea to know your test results and keep a list of the medicines you take. How can you care for yourself at home? · Do not drink and drive. · Be safe with medicines. Take your medicines exactly as prescribed. Call your doctor if you think you are having a problem with your medicine. · Your doctor may have prescribed disulfiram (Antabuse). Do not drink any alcohol while you are taking this medicine. You may have severe or even life-threatening side effects from even small amounts of alcohol. · If you were given medicine to prevent nausea, be sure to take it exactly as prescribed. · Before you take any medicine, tell your doctor if:  ? You have had a bad reaction to any medicines in the past.  ? You are taking other medicines, including over-the-counter ones, or have other health problems. ? You are or could be pregnant. · Be prepared to have some symptoms of withdrawal in the next few days. · Drink plenty of liquids in the next few days. · Seek help if you need it to stop drinking. Getting counseling and joining a support group can help you stay sober. Try a support group such as Alcoholics Anonymous. · Avoid alcohol when you take medicines. It can react with many medicines and cause serious problems. When should you call for help? Call 911 anytime you think you may need emergency care.  For example, call if:    · You feel confused and are seeing things that are not there.     · You are thinking about killing yourself or hurting others.     · You have a seizure.     · You vomit blood or what looks like coffee grounds.    Call your doctor now or seek immediate medical care if:    · You have trembling, restlessness, sweating, and other withdrawal symptoms that are new or that get worse.     · Your withdrawal symptoms come back after not bothering you for days or weeks.     · You can't stop vomiting.    Watch closely for changes in your health, and be sure to contact your doctor if:    · You need help to stop drinking. Where can you learn more? Go to http://verenice-surya.info/. Enter T102 in the search box to learn more about \"Acute Alcohol Intoxication: Care Instructions. \"  Current as of: May 7, 2018  Content Version: 11.9  © 1144-5471 [employer]. Care instructions adapted under license by [employer] (which disclaims liability or warranty for this information). If you have questions about a medical condition or this instruction, always ask your healthcare professional. [address] any warranty or liability for your use of this information.

## 2019-05-23 NOTE — ED NOTES
Unable to get iv and labs . Pt was stuck three times .  Pt is asking to leave AMA , provider is aware

## 2019-05-23 NOTE — ED PROVIDER NOTES
63 y.o. male with past medical history significant for diabetes, HTN, and hepatitis C who presents from home via EMS with chief complaint of right buttocks pain. Patient history limited due to intoxication of patient. Patient was found by EMS on ground after a fall. Patient c/o right buttocks pain and back pain. Patient states he was walking down steps and he slipped and fell. Patient reports he did not hit his head. Patient notes he is a diabetic and has leg and feet numbness due to neuropathy. Patient affirms back pain and flank pain. Patient denies headache, chest pain, and abdominal pain. There are no other acute medical concerns at this time. Full history, physical exam, and ROS unable to be obtained due to:  intoxication. Social hx: Former smoker, EtOH use  PCP: Mery Paredes MD    Note written by Elo York, as dictated by Brii Bay MD 11:08 PM       The history is provided by the patient. The history is limited by the condition of the patient. No  was used. Past Medical History:   Diagnosis Date    Diabetes ([facility] [address]     Hepatitis C     Hypertension        History reviewed. No pertinent surgical history. History reviewed. No pertinent family history.     Social History     Socioeconomic History    Marital status: SINGLE     Spouse name: Not on file    Number of children: Not on file    Years of education: Not on file    Highest education level: Not on file   Occupational History    Not on file   Social Needs    Financial resource strain: Not on file    Food insecurity:     Worry: Not on file     Inability: Not on file    Transportation needs:     Medical: Not on file     Non-medical: Not on file   Tobacco Use    Smoking status: Former Smoker     Types: Cigarettes     Last attempt to quit: 2016     Years since quittin.7    Smokeless tobacco: Never Used   Substance and Sexual Activity    Alcohol use: No     Alcohol/week: 0.0 oz    Drug use: No    Sexual activity: Never   Lifestyle    Physical activity:     Days per week: Not on file     Minutes per session: Not on file    Stress: Not on file   Relationships    Social connections:     Talks on phone: Not on file     Gets together: Not on file     Attends Mandae[employer] service: Not on file     Active member of club or organization: Not on file     Attends meetings of clubs or organizations: Not on file     Relationship status: Not on file    Intimate partner violence:     Fear of current or ex partner: Not on file     Emotionally abused: Not on file     Physically abused: Not on file     Forced sexual activity: Not on file   Other Topics Concern    Not on file   Social History Narrative    Not on file         ALLERGIES: Tramadol and Lisinopril    Review of Systems   Unable to perform ROS: Other   Cardiovascular: Negative for chest pain. Gastrointestinal: Negative for abdominal pain. Musculoskeletal: Positive for back pain and myalgias (\"flank pain\"). Neurological: Positive for numbness (\"legs and feet\"). Negative for headaches. All other systems reviewed and are negative. Vitals:    05/22/19 2317   BP: 128/72   Pulse: 86   Resp: 16   Temp: 98 °F (36.7 °C)   SpO2: 99%            Physical Exam   Nursing note and vitals reviewed. CONSTITUTIONAL: Well-appearing; well-nourished; in no apparent distress  HEAD: Normocephalic; atraumatic  EYES: PERRL; EOM intact; conjunctiva and sclera are clear bilaterally. ENT: No rhinorrhea; normal pharynx with no tonsillar hypertrophy; mucous membranes pink/moist, no erythema, no exudate. NECK: Supple; non-tender; no cervical lymphadenopathy  CARD: Normal S1, S2; no murmurs, rubs, or gallops. Regular rate and rhythm. RESP: Normal respiratory effort; breath sounds clear and equal bilaterally; no wheezes, rhonchi, or rales. ABD: Normal bowel sounds; non-distended; non-tender; no palpable organomegaly, no masses, no bruits.   Back Exam: Normal inspection; L/S vertebral point tenderness, no CVA tenderness. Decreased due to pain. EXT: Normal ROM in all four extremities; non-tender to palpation; no swelling or deformity; distal pulses are normal, no edema. SKIN: Warm; dry; no rash. NEURO:Alert and oriented x 3, coherent, LULI-XII grossly intact, sensory and motor are non-focal.      MDM  Number of Diagnoses or Management Options  Alcohol abuse:   Contusion of right side of back, initial encounter:   Fall, initial encounter:   Diagnosis management comments: Assessment: 60-year-old male, who presents to the ED for evaluation status post fall with acute alcohol intoxication. The patient needs evaluation for head injury, cervical spine, lumbar spine and pelvis injury. He appears hemodynamically stable at this time. Plan: Lab/ CT scan of the head, cervical spine, lumbar spine and pelvis/ IV fluid/ Monitor and Reevaluate. Amount and/or Complexity of Data Reviewed  Clinical lab tests: ordered and reviewed  Tests in the radiology section of CPT®: ordered and reviewed  Tests in the medicine section of CPT®: reviewed and ordered  Discussion of test results with the performing providers: yes  Decide to obtain previous medical records or to obtain history from someone other than the patient: yes  Obtain history from someone other than the patient: yes  Review and summarize past medical records: yes  Discuss the patient with other providers: yes  Independent visualization of images, tracings, or specimens: yes    Risk of Complications, Morbidity, and/or Mortality  Presenting problems: moderate  Diagnostic procedures: moderate  Management options: moderate    Patient Progress  Patient progress: stable         Procedures     Progress Note:   Pt has been reexamined by Alejandro Moon MD. Pt is feeling much better. Symptoms have improved. All available results have been reviewed with pt and any available family. Pt understands sx, dx, and tx in ED.  Care plan has been outlined and questions have been answered. Pt is ready to go home. Will send home on Low-back pain and contusion of the back and alcohol intoxication instruction. Prescription of naproxen for pain as needed. Outpatient referral with PCP as needed. Written by Ashley Grubbs MD,6:58 AM    .   .

## 2019-07-28 ENCOUNTER — HOSPITAL ENCOUNTER (EMERGENCY)
Age: 59
Discharge: HOME | End: 2019-07-28
Attending: STUDENT IN AN ORGANIZED HEALTH CARE EDUCATION/TRAINING PROGRAM
Payer: MEDICARE

## 2019-07-28 VITALS
RESPIRATION RATE: 18 BRPM | OXYGEN SATURATION: 98 % | TEMPERATURE: 98.8 F | HEART RATE: 110 BPM | SYSTOLIC BLOOD PRESSURE: 136 MMHG | DIASTOLIC BLOOD PRESSURE: 81 MMHG

## 2019-07-28 DIAGNOSIS — S39.012A STRAIN OF LUMBAR REGION, INITIAL ENCOUNTER: Primary | ICD-10-CM

## 2019-07-28 DIAGNOSIS — S90.829A BLISTER OF FOOT, UNSPECIFIED LATERALITY, INITIAL ENCOUNTER: ICD-10-CM

## 2019-07-28 LAB
GLUCOSE BLD STRIP.AUTO-MCNC: 166 MG/DL (ref 65–100)
SERVICE CMNT-IMP: ABNORMAL

## 2019-07-28 PROCEDURE — 74011000250 HC RX REV CODE- 250: Performed by: PHYSICIAN ASSISTANT

## 2019-07-28 PROCEDURE — 82962 GLUCOSE BLOOD TEST: CPT

## 2019-07-28 PROCEDURE — 74011250637 HC RX REV CODE- 250/637: Performed by: PHYSICIAN ASSISTANT

## 2019-07-28 PROCEDURE — 99283 EMERGENCY DEPT VISIT LOW MDM: CPT

## 2019-07-28 RX ORDER — ARM BRACE
EACH MISCELLANEOUS
Qty: 1 EACH | Refills: 0 | Status: SHIPPED | OUTPATIENT
Start: 2019-07-28 | End: 2020-01-04

## 2019-07-28 RX ORDER — LIDOCAINE 50 MG/G
1 PATCH TOPICAL EVERY 24 HOURS
Status: DISCONTINUED | OUTPATIENT
Start: 2019-07-28 | End: 2019-07-28 | Stop reason: HOSPADM

## 2019-07-28 RX ORDER — DICLOFENAC SODIUM 50 MG/1
50 TABLET, DELAYED RELEASE ORAL 2 TIMES DAILY
Qty: 20 TAB | Refills: 0 | Status: SHIPPED | OUTPATIENT
Start: 2019-07-28 | End: 2020-05-22 | Stop reason: ALTCHOICE

## 2019-07-28 RX ORDER — NAPROXEN 250 MG/1
500 TABLET ORAL
Status: COMPLETED | OUTPATIENT
Start: 2019-07-28 | End: 2019-07-28

## 2019-07-28 RX ORDER — LIDOCAINE 40 MG/G
CREAM TOPICAL
Qty: 15 G | Refills: 0 | Status: ON HOLD | OUTPATIENT
Start: 2019-07-28 | End: 2020-06-12

## 2019-07-28 RX ADMIN — NAPROXEN 500 MG: 250 TABLET ORAL at 12:09

## 2019-07-28 NOTE — ED NOTES
Discharge instructions given by [facility].   Patient provided with note for group home

## 2019-07-28 NOTE — DISCHARGE INSTRUCTIONS

## 2019-07-28 NOTE — LETTER
Chiquita Hernandez 56 
[address] [facility] [id number] 
[phone] Work/School Note Date: 7/28/2019 To Whom It May concern: 
 
Chantel Molina was seen and treated today in the emergency room by the following provider(s): 
Attending Provider: Columba Valdivia MD 
Physician Assistant: MAT Bennett. Please allow Mr. Ling to sleep on the bottom bunk due to low back muscular strain. Sincerely, MAT Ruiz

## 2019-07-28 NOTE — ED TRIAGE NOTES
Pt ambulatory from group home with c/o chronic lower back pain getting worse over the last few days \"after being changed to a top bunk at my group home\".

## 2019-07-28 NOTE — ED PROVIDER NOTES
64 y/o male with PMHx of DM, HTN and Hep C, presenting with complaint of back pain. The patient reports a long-standing history of low back pain, but states that his pain has been worse since switching from the bottom bunk to the top bunk 3 days ago at the shelter where he stays. He denies any falls or other traumatic injuries. The pain is 5/10, nonradiating, not relieved by ibuprofen 800 mg. He also reports some recent blisters on his feet and tried popping a few of them but became concerned after talking to a friend who has had toe amputations from diabetic foot ulcers. He denies any redness, swelling or drainage from the blisters. He reports baseline foot numbness due to peripheral neuropathy, but denies fevers, new numbness, weakness, nausea or vomiting. The history is provided by the patient. Past Medical History:   Diagnosis Date    Diabetes ([facility] [address]     Hepatitis C     Hypertension        History reviewed. No pertinent surgical history. History reviewed. No pertinent family history.     Social History     Socioeconomic History    Marital status: SINGLE     Spouse name: Not on file    Number of children: Not on file    Years of education: Not on file    Highest education level: Not on file   Occupational History    Not on file   Social Needs    Financial resource strain: Not on file    Food insecurity:     Worry: Not on file     Inability: Not on file    Transportation needs:     Medical: Not on file     Non-medical: Not on file   Tobacco Use    Smoking status: Former Smoker     Types: Cigarettes     Last attempt to quit: 2016     Years since quittin.9    Smokeless tobacco: Never Used   Substance and Sexual Activity    Alcohol use: No     Alcohol/week: 0.0 standard drinks    Drug use: No    Sexual activity: Never   Lifestyle    Physical activity:     Days per week: Not on file     Minutes per session: Not on file    Stress: Not on file   Relationships    Social connections:     Talks on phone: Not on file     Gets together: Not on file     Attends [employer] service: Not on file     Active member of club or organization: Not on file     Attends meetings of clubs or organizations: Not on file     Relationship status: Not on file    Intimate partner violence:     Fear of current or ex partner: Not on file     Emotionally abused: Not on file     Physically abused: Not on file     Forced sexual activity: Not on file   Other Topics Concern    Not on file   Social History Narrative    Not on file         ALLERGIES: Tramadol and Lisinopril    Review of Systems   Constitutional: Negative for chills and fever. HENT: Negative for congestion. Respiratory: Negative for cough. Gastrointestinal: Negative for nausea and vomiting. Musculoskeletal: Positive for back pain. Negative for arthralgias and neck pain. Skin: Positive for wound. Neurological: Positive for numbness (baseline numbness in feet, no new numbness). Negative for weakness. All other systems reviewed and are negative. Vitals:    07/28/19 1140 07/28/19 1150 07/28/19 1153   BP:  136/81    Pulse: (!) 112 (!) 110    Resp:  18    Temp:  98.8 °F (37.1 °C)    SpO2: 97% 98% 98%            Physical Exam   Constitutional: He is oriented to person, place, and time. He appears well-developed and well-nourished. No distress. HENT:   Head: Normocephalic and atraumatic. Eyes: Conjunctivae and EOM are normal.   Cardiovascular: Normal rate. Pulmonary/Chest: Effort normal. No respiratory distress. Musculoskeletal:   Bilateral lumbar muscular point tenderness to palpation. No midline tenderness. Neurological: He is alert and oriented to person, place, and time. 5/5 strength of bilateral lower extremities, light touch sensation diminished in bilateral feet but otherwise intact in bilateral lower extremities. Skin: Skin is warm and dry. He is not diaphoretic.    Bilateral heels with dry skin, palpable deep blisters. No superficial blisters or appreciable open wounds. No swelling, erythema, fluctuance, induration or drainage from skin. Nursing note and vitals reviewed. MDM  Number of Diagnoses or Management Options  Blister of foot, unspecified laterality, initial encounter:   Strain of lumbar region, initial encounter:   Patient Progress  Patient progress: stable         Procedures        64 y/o male with PMHx of DM, HTN and Hep C, presenting with complaint of back pain. History and exam as documented above, most consistent with muscular strain. No history of trauma or midline tenderness - doubt fractures or ligamentous injuries. Low clinical suspicion for cauda equina syndrome or acute cord compression. Blisters of bilateral heels without evidence of infection. Safe for discharge home, with Rx for diclofenac and topical lidocaine cream, instructions for diabetic foot care. He has a PCP appt scheduled this week and will follow up at that time. Strict ED return precautions discussed and provided in writing at time of discharge. The patient verbalized understanding and agreement with this plan.

## 2020-01-04 ENCOUNTER — HOSPITAL ENCOUNTER (EMERGENCY)
Age: 60
Discharge: HOME | End: 2020-01-04
Attending: EMERGENCY MEDICINE
Payer: MEDICARE

## 2020-01-04 VITALS
SYSTOLIC BLOOD PRESSURE: 153 MMHG | OXYGEN SATURATION: 96 % | DIASTOLIC BLOOD PRESSURE: 85 MMHG | WEIGHT: 210 LBS | RESPIRATION RATE: 19 BRPM | BODY MASS INDEX: 31.1 KG/M2 | TEMPERATURE: 98.5 F | HEIGHT: 69 IN | HEART RATE: 117 BPM

## 2020-01-04 DIAGNOSIS — S39.012A STRAIN OF LUMBAR REGION, INITIAL ENCOUNTER: Primary | ICD-10-CM

## 2020-01-04 PROCEDURE — 99282 EMERGENCY DEPT VISIT SF MDM: CPT

## 2020-01-04 RX ORDER — METHOCARBAMOL 750 MG/1
750 TABLET, FILM COATED ORAL 4 TIMES DAILY
Qty: 20 TAB | Refills: 0 | Status: ON HOLD | OUTPATIENT
Start: 2020-01-04 | End: 2020-06-12

## 2020-01-04 RX ORDER — NAPROXEN 500 MG/1
500 TABLET ORAL
Qty: 20 TAB | Refills: 0 | Status: SHIPPED | OUTPATIENT
Start: 2020-01-04 | End: 2020-03-18 | Stop reason: SDUPTHER

## 2020-01-04 NOTE — DISCHARGE INSTRUCTIONS

## 2020-01-04 NOTE — ED TRIAGE NOTES
Pt presents to the ED c/o lower back pain x1 day,. 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 Pt reports hx of lower back pain related to \"L2 or something, I can't remember\"    Pt is able to ambulate without gait disturbance. Pt reports Excedrin yesterday.

## 2020-01-04 NOTE — ED PROVIDER NOTES
EMERGENCY DEPARTMENT HISTORY AND PHYSICAL EXAM      Date: 1/4/2020  Patient Name: Bri Myers    History of Presenting Illness     Chief Complaint   Patient presents with    Back Pain     History Provided By: Patient    HPI: Bri Myers, 63 y.o. male with past medical history significant diabetes, hypertension, and hepatitis C who presents via private vehicle to the ED with cc of low back pain on the left side for the past 3 days. Patient believes he pulled something while working. He was climbing a ladder with shingles when the pain started. He tried taking Excedrin for symptom relief. His pain is worse with twisting and bending and nothing makes the pain better. He denies any bowel or bladder incontinence. His pain is described as dull and aching and does not radiate. He has a history of low back pain in the past, but it has been quite a while. Patient is specifically asking for no narcotics. PMHx: Hypertension, diabetes, hepatitis C  Social Hx: Former smoker, denies alcohol use, denies illegal drug use    PCP: Susan Gonzalez NP    There are no other complaints, changes, or physical findings at this time. No current facility-administered medications on file prior to encounter. Current Outpatient Medications on File Prior to Encounter   Medication Sig Dispense Refill    diclofenac EC (VOLTAREN) 50 mg EC tablet Take 1 Tab by mouth two (2) times a day. 20 Tab 0    ibuprofen (MOTRIN) 800 mg tablet Take 1 Tab by mouth every six (6) hours as needed for Pain. 30 Tab 0    OLANZapine (ZYPREXA) 10 mg tablet TAKE 1 TAB BY MOUTH NIGHTLY. INDICATIONS: NERVES/SCHIZOAFFECTIVE 30 Tab 5    gabapentin (NEURONTIN) 300 mg capsule TAKE 1 CAP BY MOUTH at breakfast and lunch, 2 at dinner for NERVE PAIN 120 Cap 5    amLODIPine (NORVASC) 5 mg tablet Take 1 Tab by mouth daily.  Indications: pressure 30 Tab 6    metFORMIN (GLUCOPHAGE) 1,000 mg tablet Take 1 Tab by mouth two (2) times daily (with meals). Indications: type 2 diabetes mellitus 60 Tab 5    valsartan-hydroCHLOROthiazide (DIOVAN-HCT) 160-12.5 mg per tablet Take 1 Tab by mouth daily. 30 Tab 11    aspirin delayed-release 81 mg tablet Take 1 Tab by mouth daily. Indications: prevent heart problem 100 Tab 3    lidocaine (XYLOCAINE) 4 % topical cream Apply  to affected area three (3) times daily as needed for Pain. 15 g 0    [DISCONTINUED] Back Brace misc Lumbar support brace 1 Each 0    [DISCONTINUED] HYDROcodone-acetaminophen (LORTAB 5-325) 5-325 mg per tablet Take 1 Tab by mouth every six (6) hours as needed for Pain (breakthrough pain). Max Daily Amount: 4 Tabs. 6 Tab 0    [DISCONTINUED] diphenhydrAMINE (BENADRYL) 50 mg capsule Take 1 Cap by mouth nightly. Indications: sleep 30 Cap 6    [DISCONTINUED] divalproex ER (DEPAKOTE ER) 250 mg ER tablet TAKE 1 TAB BY MOUTH TWO (2) TIMES A DAY. INDICATIONS: NERVES 60 Tab 6    insulin NPH/insulin regular (NOVOLIN 70/30, HUMULIN 70/30) 100 unit/mL (70-30) injection 52 units AM and 46 units with dinner for sugar diabetes. 30 mL 11    Insulin Needles, Disposable, 31 gauge x 5/16\" ndle Use to inject insulin twice daily 1 Package 3    [DISCONTINUED] clotrimazole (LOTRIMIN) 1 % topical cream Apply  to affected area two (2) times a day. 15 g 0    [DISCONTINUED] esomeprazole (NEXIUM) 40 mg capsule TAKE 1 CAP BY MOUTH DAILY. 30 Cap 11     Past History     Past Medical History:  Past Medical History:   Diagnosis Date    Diabetes ([facility] [address]     Hepatitis C     Hypertension      Past Surgical History:  History reviewed. No pertinent surgical history. Family History:  History reviewed. No pertinent family history. Social History:  Social History     Tobacco Use    Smoking status: Former Smoker     Types: Cigarettes     Last attempt to quit: 9/1/2016     Years since quitting: 3.3    Smokeless tobacco: Never Used   Substance Use Topics    Alcohol use: No     Alcohol/week: 0.0 standard drinks    Drug use:  No Allergies: Allergies   Allergen Reactions    Tramadol Nausea and Vomiting    Lisinopril Cough     Developed cough while taking lisinopril     Review of Systems   Review of Systems   Constitutional: Negative for chills and fever. HENT: Negative for congestion, rhinorrhea, sneezing and sore throat. Eyes: Negative for redness and visual disturbance. Respiratory: Negative for shortness of breath. Cardiovascular: Negative for chest pain and leg swelling. Gastrointestinal: Negative for abdominal pain, nausea and vomiting. Genitourinary: Negative for difficulty urinating and frequency. Musculoskeletal: Positive for back pain. Negative for myalgias and neck stiffness. Skin: Negative for rash. Neurological: Negative for dizziness, syncope, weakness and headaches. Hematological: Negative for adenopathy. All other systems reviewed and are negative. Physical Exam   Physical Exam  Vitals signs and nursing note reviewed. Constitutional:       Appearance: Normal appearance. He is well-developed. HENT:      Head: Normocephalic and atraumatic. Neck:      Musculoskeletal: Full passive range of motion without pain, normal range of motion and neck supple. Cardiovascular:      Rate and Rhythm: Regular rhythm. Tachycardia present. Pulses: Normal pulses. Heart sounds: Normal heart sounds. No murmur. Pulmonary:      Effort: Pulmonary effort is normal. No respiratory distress. Breath sounds: Normal breath sounds. Chest:      Chest wall: No tenderness. Abdominal:      General: Bowel sounds are normal.      Palpations: Abdomen is soft. Tenderness: There is no tenderness. There is no guarding or rebound. Musculoskeletal:        Back:    Skin:     General: Skin is warm and dry. Findings: No erythema or rash. Neurological:      Mental Status: He is alert and oriented to person, place, and time.    Psychiatric:         Speech: Speech normal.         Behavior: Behavior normal.         Thought Content: Thought content normal.         Judgment: Judgment normal.       Diagnostic Study Results   Labs -   No results found for this or any previous visit (from the past 12 hour(s)). Radiologic Studies -   No orders to display     No results found. Medical Decision Making   I am the first provider for this patient. I reviewed the vital signs, available nursing notes, past medical history, past surgical history, family history and social history. Vital Signs-Reviewed the patient's vital signs. Patient Vitals for the past 12 hrs:   Temp Pulse Resp BP SpO2   01/04/20 0701 98.5 °F (36.9 °C) (!) 117 19 153/85 96 %     Pulse Oximetry Analysis - 96% on RA    Records Reviewed: Nursing Notes    Provider Notes (Medical Decision Making):   52-year-old male presents with low back pain for the past 3 days. Differential includes lumbar strain, herniated disc, and degenerative disc disease. Low suspicion for cauda equina or any concerning diagnoses. He has a follow-up appointment scheduled with [facility] this week. ED Course:   Initial assessment performed. The patients presenting problems have been discussed, and they are in agreement with the care plan formulated and outlined with them. I have encouraged them to ask questions as they arise throughout their visit. Progress Note:   Updated pt on all returned results and findings. Discussed the importance of proper follow up as referred below along with return precautions. Pt in agreement with the care plan and expresses agreement with and understanding of all items discussed. Disposition:  Discharge Note:  The pt is ready for discharge. The pt's signs, symptoms, diagnosis, and discharge instructions have been discussed and pt has conveyed their understanding. The pt is to follow up as recommended or return to ER should their symptoms worsen. Plan has been discussed and pt is in agreement. PLAN:  1.    Current Discharge Medication List      START taking these medications    Details   methocarbamol (ROBAXIN) 750 mg tablet Take 1 Tab by mouth four (4) times daily. Qty: 20 Tab, Refills: 0      naproxen (NAPROSYN) 500 mg tablet Take 1 Tab by mouth every twelve (12) hours as needed for Pain. Qty: 20 Tab, Refills: 0           2. Follow-up Information     Follow up With Specialties Details Why Contact Info    [facility]  Go on 1/6/2020  [address] [facility] [address]    [facility] EMERGENCY DEPT Emergency Medicine  As needed, If symptoms worsen Vicente Hamilton        Return to ED if worse     Diagnosis     Clinical Impression:   1. Strain of lumbar region, initial encounter            Please note that this dictation was completed with Dragon, computer voice recognition software. Quite often unanticipated grammatical, syntax, homophones, and other interpretive errors are inadvertently transcribed by the computer software. Please disregard these errors. Additionally, please excuse any errors that have escaped final proofreading.

## 2020-01-04 NOTE — ED NOTES
Pt here for evaluation of left lower back pain x a few days. Pt is A+Ox3 clear speaking. Emergency Department Nursing Plan of Care       The Nursing Plan of Care is developed from the Nursing assessment and Emergency Department Attending provider initial evaluation. The plan of care may be reviewed in the ED Provider note.     The Plan of Care was developed with the following considerations:   Patient / Family readiness to learn indicated by:verbalized understanding  Persons(s) to be included in education: patient  Barriers to Learning/Limitations:No    Signed     Tone Sherwood RN    1/4/2020   7:24 AM

## 2020-01-04 NOTE — ED NOTES
Pt for DC home and plan of care accepted by pt. Patient (s)  given copy of dc instructions and 2 script(s). Patient (s) 2 verbalized understanding of instructions and script (s). Patient given a current medication reconciliation form and verbalized understanding of their medications. Patient (s)2 verbalized understanding of the importance of discussing medications with  his or her physician or clinic they will be following up with. Patient alert and oriented and in no acute distress. Patient discharged home ambulatory with self.

## 2020-05-22 ENCOUNTER — PATIENT OUTREACH (OUTPATIENT)
Dept: FAMILY MEDICINE CLINIC | Age: 60
End: 2020-05-22

## 2020-05-26 NOTE — PROGRESS NOTES
Several attempts to reach patient at listed contacts with no success, to complete Covid-19 Ed discharge assessment. Message left with ACM contact information. Episode resolved.

## 2020-06-01 ENCOUNTER — PATIENT OUTREACH (OUTPATIENT)
Dept: FAMILY MEDICINE CLINIC | Age: 60
End: 2020-06-01

## 2020-06-11 ENCOUNTER — HOSPITAL ENCOUNTER (INPATIENT)
Age: 60
LOS: 2 days | Discharge: PSYCHIATRIC OR SUBSTANCE USE FACILITY | DRG: 918 | End: 2020-06-13
Attending: INTERNAL MEDICINE | Admitting: INTERNAL MEDICINE
Payer: MEDICARE

## 2020-06-11 PROBLEM — T50.901A MEDICATION OVERDOSE: Status: ACTIVE | Noted: 2020-06-11

## 2020-06-11 LAB
ANION GAP SERPL CALC-SCNC: 14 MMOL/L (ref 5–15)
ARTERIAL PATENCY WRIST A: ABNORMAL
BASE DEFICIT BLD-SCNC: 9 MMOL/L
BASOPHILS # BLD: 0.2 K/UL (ref 0–0.1)
BASOPHILS NFR BLD: 1 % (ref 0–1)
BDY SITE: ABNORMAL
BUN SERPL-MCNC: 29 MG/DL (ref 6–20)
BUN/CREAT SERPL: 13 (ref 12–20)
CA-I BLD-SCNC: 1.08 MMOL/L (ref 1.12–1.32)
CALCIUM SERPL-MCNC: 9.3 MG/DL (ref 8.5–10.1)
CHLORIDE SERPL-SCNC: 104 MMOL/L (ref 97–108)
CO2 SERPL-SCNC: 20 MMOL/L (ref 21–32)
CREAT SERPL-MCNC: 2.24 MG/DL (ref 0.7–1.3)
DIFFERENTIAL METHOD BLD: ABNORMAL
EOSINOPHIL # BLD: 0 K/UL (ref 0–0.4)
EOSINOPHIL NFR BLD: 0 % (ref 0–7)
ERYTHROCYTE [DISTWIDTH] IN BLOOD BY AUTOMATED COUNT: 13.9 % (ref 11.5–14.5)
GAS FLOW.O2 O2 DELIVERY SYS: ABNORMAL L/MIN
GLUCOSE BLD STRIP.AUTO-MCNC: 96 MG/DL (ref 65–100)
GLUCOSE BLD STRIP.AUTO-MCNC: 97 MG/DL (ref 65–100)
GLUCOSE SERPL-MCNC: 56 MG/DL (ref 65–100)
HCO3 BLD-SCNC: 16.8 MMOL/L (ref 22–26)
HCT VFR BLD AUTO: 48.7 % (ref 36.6–50.3)
HGB BLD-MCNC: 15.8 G/DL (ref 12.1–17)
IMM GRANULOCYTES # BLD AUTO: 0 K/UL (ref 0–0.04)
IMM GRANULOCYTES NFR BLD AUTO: 0 % (ref 0–0.5)
LACTATE SERPL-SCNC: 5.7 MMOL/L (ref 0.4–2)
LYMPHOCYTES # BLD: 1.4 K/UL (ref 0.8–3.5)
LYMPHOCYTES NFR BLD: 8 % (ref 12–49)
MCH RBC QN AUTO: 30.2 PG (ref 26–34)
MCHC RBC AUTO-ENTMCNC: 32.4 G/DL (ref 30–36.5)
MCV RBC AUTO: 93.1 FL (ref 80–99)
MONOCYTES # BLD: 0 K/UL (ref 0–1)
MONOCYTES NFR BLD: 0 % (ref 5–13)
NEUTS SEG # BLD: 16.2 K/UL (ref 1.8–8)
NEUTS SEG NFR BLD: 91 % (ref 32–75)
NRBC # BLD: 0 K/UL (ref 0–0.01)
NRBC BLD-RTO: 0 PER 100 WBC
PCO2 BLD: 30.7 MMHG (ref 35–45)
PH BLD: 7.34 [PH] (ref 7.35–7.45)
PLATELET # BLD AUTO: 231 K/UL (ref 150–400)
PMV BLD AUTO: 10.4 FL (ref 8.9–12.9)
PO2 BLD: 92 MMHG (ref 80–100)
POTASSIUM SERPL-SCNC: 4.3 MMOL/L (ref 3.5–5.1)
RBC # BLD AUTO: 5.23 M/UL (ref 4.1–5.7)
RBC MORPH BLD: ABNORMAL
SAO2 % BLD: 97 % (ref 92–97)
SERVICE CMNT-IMP: NORMAL
SERVICE CMNT-IMP: NORMAL
SODIUM SERPL-SCNC: 138 MMOL/L (ref 136–145)
SPECIMEN TYPE: ABNORMAL
WBC # BLD AUTO: 17.8 K/UL (ref 4.1–11.1)

## 2020-06-11 PROCEDURE — 74011250636 HC RX REV CODE- 250/636: Performed by: INTERNAL MEDICINE

## 2020-06-11 PROCEDURE — 74011250637 HC RX REV CODE- 250/637: Performed by: INTERNAL MEDICINE

## 2020-06-11 PROCEDURE — 74011000258 HC RX REV CODE- 258: Performed by: INTERNAL MEDICINE

## 2020-06-11 PROCEDURE — 36415 COLL VENOUS BLD VENIPUNCTURE: CPT

## 2020-06-11 PROCEDURE — 83605 ASSAY OF LACTIC ACID: CPT

## 2020-06-11 PROCEDURE — 87040 BLOOD CULTURE FOR BACTERIA: CPT

## 2020-06-11 PROCEDURE — 82962 GLUCOSE BLOOD TEST: CPT

## 2020-06-11 PROCEDURE — 85025 COMPLETE CBC W/AUTO DIFF WBC: CPT

## 2020-06-11 PROCEDURE — 80048 BASIC METABOLIC PNL TOTAL CA: CPT

## 2020-06-11 PROCEDURE — 82803 BLOOD GASES ANY COMBINATION: CPT

## 2020-06-11 PROCEDURE — 65660000000 HC RM CCU STEPDOWN

## 2020-06-11 PROCEDURE — 74011000250 HC RX REV CODE- 250: Performed by: INTERNAL MEDICINE

## 2020-06-11 RX ORDER — HEPARIN SODIUM 5000 [USP'U]/ML
5000 INJECTION, SOLUTION INTRAVENOUS; SUBCUTANEOUS EVERY 8 HOURS
Status: DISCONTINUED | OUTPATIENT
Start: 2020-06-11 | End: 2020-06-13 | Stop reason: HOSPADM

## 2020-06-11 RX ORDER — IBUPROFEN 200 MG
1 TABLET ORAL DAILY
Status: DISCONTINUED | OUTPATIENT
Start: 2020-06-11 | End: 2020-06-13 | Stop reason: HOSPADM

## 2020-06-11 RX ORDER — ATORVASTATIN CALCIUM 10 MG/1
10 TABLET, FILM COATED ORAL
Status: DISCONTINUED | OUTPATIENT
Start: 2020-06-11 | End: 2020-06-13 | Stop reason: HOSPADM

## 2020-06-11 RX ORDER — SODIUM BICARBONATE IN D5W 150/1000ML
PLASTIC BAG, INJECTION (ML) INTRAVENOUS CONTINUOUS
Status: DISCONTINUED | OUTPATIENT
Start: 2020-06-11 | End: 2020-06-12

## 2020-06-11 RX ORDER — ASPIRIN 81 MG/1
81 TABLET ORAL DAILY
Status: DISCONTINUED | OUTPATIENT
Start: 2020-06-12 | End: 2020-06-11

## 2020-06-11 RX ORDER — SODIUM CHLORIDE 0.9 % (FLUSH) 0.9 %
5-40 SYRINGE (ML) INJECTION EVERY 8 HOURS
Status: DISCONTINUED | OUTPATIENT
Start: 2020-06-11 | End: 2020-06-13 | Stop reason: HOSPADM

## 2020-06-11 RX ORDER — MAGNESIUM SULFATE 100 %
4 CRYSTALS MISCELLANEOUS AS NEEDED
Status: DISCONTINUED | OUTPATIENT
Start: 2020-06-11 | End: 2020-06-13 | Stop reason: HOSPADM

## 2020-06-11 RX ORDER — DEXTROSE 50 % IN WATER (D50W) INTRAVENOUS SYRINGE
12.5-25 AS NEEDED
Status: DISCONTINUED | OUTPATIENT
Start: 2020-06-11 | End: 2020-06-13 | Stop reason: HOSPADM

## 2020-06-11 RX ORDER — SODIUM CHLORIDE 0.9 % (FLUSH) 0.9 %
5-40 SYRINGE (ML) INJECTION AS NEEDED
Status: DISCONTINUED | OUTPATIENT
Start: 2020-06-11 | End: 2020-06-13 | Stop reason: HOSPADM

## 2020-06-11 RX ORDER — LORAZEPAM 2 MG/ML
4 INJECTION INTRAMUSCULAR
Status: DISCONTINUED | OUTPATIENT
Start: 2020-06-11 | End: 2020-06-13 | Stop reason: HOSPADM

## 2020-06-11 RX ORDER — ONDANSETRON 4 MG/1
4 TABLET, ORALLY DISINTEGRATING ORAL
Status: DISCONTINUED | OUTPATIENT
Start: 2020-06-11 | End: 2020-06-13 | Stop reason: HOSPADM

## 2020-06-11 RX ORDER — LANOLIN ALCOHOL/MO/W.PET/CERES
100 CREAM (GRAM) TOPICAL DAILY
Status: DISCONTINUED | OUTPATIENT
Start: 2020-06-12 | End: 2020-06-11

## 2020-06-11 RX ORDER — PANTOPRAZOLE SODIUM 40 MG/1
40 TABLET, DELAYED RELEASE ORAL
Status: DISCONTINUED | OUTPATIENT
Start: 2020-06-11 | End: 2020-06-13 | Stop reason: HOSPADM

## 2020-06-11 RX ORDER — HYDRALAZINE HYDROCHLORIDE 20 MG/ML
10 INJECTION INTRAMUSCULAR; INTRAVENOUS
Status: DISCONTINUED | OUTPATIENT
Start: 2020-06-11 | End: 2020-06-13 | Stop reason: HOSPADM

## 2020-06-11 RX ORDER — INSULIN LISPRO 100 [IU]/ML
INJECTION, SOLUTION INTRAVENOUS; SUBCUTANEOUS
Status: DISCONTINUED | OUTPATIENT
Start: 2020-06-11 | End: 2020-06-13 | Stop reason: HOSPADM

## 2020-06-11 RX ORDER — LORAZEPAM 2 MG/ML
2 INJECTION INTRAMUSCULAR
Status: DISCONTINUED | OUTPATIENT
Start: 2020-06-11 | End: 2020-06-13 | Stop reason: HOSPADM

## 2020-06-11 RX ORDER — THIAMINE HYDROCHLORIDE 100 MG/ML
300 INJECTION, SOLUTION INTRAMUSCULAR; INTRAVENOUS DAILY
Status: DISCONTINUED | OUTPATIENT
Start: 2020-06-11 | End: 2020-06-11

## 2020-06-11 RX ORDER — SERTRALINE HYDROCHLORIDE 50 MG/1
50 TABLET, FILM COATED ORAL DAILY
Status: DISCONTINUED | OUTPATIENT
Start: 2020-06-12 | End: 2020-06-13 | Stop reason: HOSPADM

## 2020-06-11 RX ORDER — ASPIRIN 325 MG/1
100 TABLET, FILM COATED ORAL DAILY
Status: DISCONTINUED | OUTPATIENT
Start: 2020-06-12 | End: 2020-06-12 | Stop reason: SDUPTHER

## 2020-06-11 RX ORDER — FOLIC ACID 1 MG/1
1 TABLET ORAL DAILY
Status: DISCONTINUED | OUTPATIENT
Start: 2020-06-12 | End: 2020-06-13 | Stop reason: HOSPADM

## 2020-06-11 RX ADMIN — PANTOPRAZOLE SODIUM 40 MG: 40 TABLET, DELAYED RELEASE ORAL at 20:30

## 2020-06-11 RX ADMIN — HEPARIN SODIUM 5000 UNITS: 5000 INJECTION INTRAVENOUS; SUBCUTANEOUS at 22:17

## 2020-06-11 RX ADMIN — Medication 10 ML: at 18:42

## 2020-06-11 RX ADMIN — Medication 10 ML: at 22:18

## 2020-06-11 RX ADMIN — HEPARIN SODIUM 5000 UNITS: 5000 INJECTION INTRAVENOUS; SUBCUTANEOUS at 18:41

## 2020-06-11 RX ADMIN — SODIUM BICARBONATE 150 MEQ/1,000 ML IN DEXTROSE 5 % INTRAVENOUS: SOLUTION at 21:37

## 2020-06-11 RX ADMIN — THIAMINE HYDROCHLORIDE 300 MG: 100 INJECTION, SOLUTION INTRAMUSCULAR; INTRAVENOUS at 20:39

## 2020-06-11 RX ADMIN — ATORVASTATIN CALCIUM 10 MG: 10 TABLET, FILM COATED ORAL at 20:30

## 2020-06-11 NOTE — PROGRESS NOTES
0700: Verbal direct transfer-in report given to [facility] (oncoming nurse) by Gracy Wilson [facility] nurse). Report included the following information SBAR, Kardex, ED Summary, Procedure Summary, Intake/Output, MAR, Recent Results, Med Rec Status and Cardiac Rhythm NSR.     1220: Patient arrived on PCU. ED Registration called and they confirmed they will take care of registration. Admitting physician to come up. Patient asked for lunch. 1640: Psychiatric NP called in attempt to video conference with patient, but the machine is in use. She will try back. [phone]: Spoke with Jean Zuleta from [facility] and gave her updates on patient including recent lab results (specifically Lactic Acid). I informed her that his Lactic Acid increased to 5.7 and MD will order bicarb drip. She asked if we could take his VBGs prior to bicarb drip, informed andrew RN of this request.    1900: Bedside shift change report given to Maren Miller (oncoming nurse) by [facility] (offgoing nurse). Report included the following information SBAR, Kardex, ED Summary, Procedure Summary, Intake/Output, MAR, Recent Results, Med Rec Status and Cardiac Rhythm NSR.

## 2020-06-11 NOTE — PROGRESS NOTES
CM acknowledge inpatient admission. CM will follow pt for consults and any needs prior to discharge. If any concerns or questions arise pertaining to pt discharge, please contact unit CM. Pt will potentially need inpatient psych placement. CM will continue to follow patient for discharge planning needs and arrange for services as deemed necessary.     John Muñoz, Care Manager  [phone]

## 2020-06-11 NOTE — H&P
Hospitalist Admission Note    NAME: Johny Silva   :  1960   MRN:  [id number]     Date/Time:  2020 2:57 PM    Patient PCP: Racquel Chambers MD  ______________________________________________________________________  Given the patient's current clinical presentation, I have a high level of concern for decompensation if discharged from the emergency department. Complex decision making was performed, which includes reviewing the patient's available past medical records, laboratory results, and x-ray films. My assessment of this patient's clinical condition and my plan of care is as follows. Assessment / Plan:  Suicidal attempt  Medication overdose: Reported ibuprofen, amlodipine and metformin  Depression  We will admit to stepdown unit, suicide precaution, monitor blood pressure, negative for GI bleed or worsening epigastric pain  We will consult psychiatry  Continue Zoloft, restart Latuda once verified by the pharmacy    Lactic acidosis  Hypothermia   Noted temp 94.6  Blood cultures x2  Repeat lactic acid   cassie monroe     Alcohol abuse  Concern for alcohol withdrawal  Tobacco abuse  We will start banana bag, CIWA protocol  Start nicotine patch  Thiamine and folate      Hypertension  Hold BP meds as blood pressure is on the lower side  Diabetes mellitus  Continue with sliding scale insulin  Code Status: Full code  Surrogate Decision Maker:    DVT Prophylaxis:heparin    GI Prophylaxis:PPI  Baseline: Ambulatory      Subjective:   CHIEF COMPLAINT: Medication overdose    HISTORY OF PRESENT ILLNESS:     Madisyn Kwon is a 63 y.o.  male with past medical history of diabetes, depression, alcohol and tobacco abuse hepatitis C, hypertension was transferred from [facility] after a suicidal attempt .   PT reported taking multiple medication  Per ED notes, there were 3 open medication bottles with 18 tabs of ibuprofen 800 mg , 8 tabs of metformin 500 mg, 1 or 2 tab of amlodipine missing. Patient reported that he wanted to kill himself, has been depressed lately  And this is  His second suicidal attempt. He also reported that he drinks alcohol occasionally, unable to give me exact amount, reported that his last alcohol use was week ago( last Saturday). In the ED, he was hypotensive upon presentation, his blood pressure improved with IV fluid boluses , poison control was called and advised to check lactic acid. Initial lactic acid was 3.2, trended up to 4.9 despite IV fluid. Patient also received calcium gluconate  His labs were unremarkable except for hypokalemia . UDS was positive for barbiturates. etoh level 2g . Acetaminophen and salicylate level were  within normal limit. His creatinine was elevated  When seen, patient was alert oriented x4 reported some nausea and admitted to the suicidal attempt  We were asked to admit for work up and evaluation of the above problems. Past Medical History:   Diagnosis Date    Chronic pain     Diabetes ([facility] [address]     Hepatitis C     Hypertension         No past surgical history on file. Social History     Tobacco Use    Smoking status: Current Every Day Smoker     Packs/day: 1.00     Types: Cigarettes     Last attempt to quit: 9/1/2016     Years since quitting: 3.7    Smokeless tobacco: Never Used   Substance Use Topics    Alcohol use: Not Currently     Alcohol/week: 0.0 standard drinks     Comment: Hx of abuse- denies use tonight-smells of ETOH        No family history on file. Allergies   Allergen Reactions    Tramadol Nausea and Vomiting    Lisinopril Cough     Developed cough while taking lisinopril        Prior to Admission medications    Medication Sig Start Date End Date Taking? Authorizing Provider   valsartan (DIOVAN) 160 mg tablet Take 1 Tab by mouth daily. Indications: pressure 5/22/20   Oc Vazquez MD   atorvastatin (LIPITOR) 10 mg tablet Take 1 Tab by mouth daily.  Indications: prevent stroke 5/22/20   Taylor Gloria Busby MD   amLODIPine (NORVASC) 5 mg tablet Take 1 Tab by mouth daily. Indications: pressure 5/22/20   Santo Wang MD   lurasidone (LATUDA) 80 mg tab tablet 2 tabs with dinner daily  Indications: schizophrenia 5/22/20   Santo Wang MD   metFORMIN (GLUCOPHAGE) 1,000 mg tablet Take 1 Tab by mouth two (2) times daily (with meals). Indications: type 2 diabetes mellitus 5/22/20   Santo Wang MD   gabapentin (NEURONTIN) 800 mg tablet Take 1 Tab by mouth three (3) times daily. Max Daily Amount: 2,400 mg. Indications: nerves 5/22/20   Santo Wang MD   diclofenac (VOLTAREN) 1 % gel AAA in thin coat BID as needed for painful joints 5/22/20   Gloria Vazquez MD   sertraline (ZOLOFT) 50 mg tablet Take 1 Tab by mouth daily. Indications: nerves 5/22/20   Gloria Vazquez MD   glucose blood VI test strips (ASCENSIA AUTODISC VI, ONE TOUCH ULTRA TEST VI) strip Use to check sugar daily dx diabetes e11.9, not on insulin. 5/22/20   Santo Wang MD   Blood-Glucose Meter monitoring kit Dx:e11.9, not on insulin; check sugar daily as directed. 5/22/20   Santo Wang MD   ibuprofen (MOTRIN) 800 mg tablet Take 1 Tab by mouth three (3) times daily as needed for Pain. 5/22/20   Gloria Vazquez MD   cyclobenzaprine (FLEXERIL) 10 mg tablet Take 1 Tab by mouth three (3) times daily as needed for Muscle Spasm(s). 5/21/20   Mac Green MD   folic acid (FOLVITE) 1 mg tablet Take 1 Tab by mouth daily. 5/1/20   Santo Wang MD   thiamine HCL (B-1) 100 mg tablet Take 1 Tab by mouth daily. 3/2/20   Santo Wang MD   aspirin delayed-release 81 mg tablet Take 1 Tab by mouth daily. Indications: prevent heart problem 3/2/20   Santo Wang MD   methocarbamol (ROBAXIN) 750 mg tablet Take 1 Tab by mouth four (4) times daily.  1/4/20   Shabnam Ramirez MD   lidocaine (XYLOCAINE) 4 % topical cream Apply  to affected area three (3) times daily as needed for Pain. 7/28/19   MAT Saldana       REVIEW OF SYSTEMS:     I am not able to complete the review of systems because: The patient is intubated and sedated    The patient has altered mental status due to his acute medical problems    The patient has baseline aphasia from prior stroke(s)    The patient has baseline dementia and is not reliable historian    The patient is in acute medical distress and unable to provide information           Total of 12 systems reviewed as follows:       POSITIVE= underlined text  Negative = text not underlined  General:  fever, chills, sweats, generalized weakness, weight loss/gain,      loss of appetite   Eyes:    blurred vision, eye pain, loss of vision, double vision  ENT:    rhinorrhea, pharyngitis   Respiratory:   cough, sputum production, SOB, CORONADO, wheezing, pleuritic pain   Cardiology:   chest pain, palpitations, orthopnea, PND, edema, syncope   Gastrointestinal:  abdominal pain , N/V, diarrhea, dysphagia, constipation, bleeding   Genitourinary:  frequency, urgency, dysuria, hematuria, incontinence   Muskuloskeletal :  arthralgia, myalgia, back pain  Hematology:  easy bruising, nose or gum bleeding, lymphadenopathy   Dermatological: rash, ulceration, pruritis, color change / jaundice  Endocrine:   hot flashes or polydipsia   Neurological:  headache, dizziness, confusion, focal weakness, paresthesia,     Speech difficulties, memory loss, gait difficulty  Psychological: Feelings of anxiety, depression, agitation    Objective:   VITALS:    Visit Vitals  /76 (BP 1 Location: Left arm, BP Patient Position: At rest)   Pulse 78   Temp 98.1 °F (36.7 °C)   Resp 16   SpO2 100%       PHYSICAL EXAM:    General:    Alert, cooperative, no distress, appears stated age.      HEENT: Atraumatic, anicteric sclerae, pink conjunctivae     No oral ulcers, mucosa moist, throat clear, dentition fair  Neck:  Supple, symmetrical,  thyroid: non tender  Lungs:   Clear to auscultation bilaterally. No Wheezing or Rhonchi. No rales. Chest wall:  No tenderness  No Accessory muscle use. Heart:   Regular  rhythm,  No  murmur   No edema  Abdomen:   Soft, non-tender. Not distended. Bowel sounds normal  Extremities: No cyanosis. No clubbing,      Skin turgor normal, Capillary refill normal, Radial dial pulse 2+  Skin:     Not pale. Not Jaundiced  No rashes   Psych: Depressed   Neurologic: EOMs intact. No facial asymmetry. No aphasia or slurred speech. Symmetrical strength, Sensation grossly intact. Alert and oriented X 4.     _______________________________________________________________________  Care Plan discussed with:    Comments   Patient x    Family      RN x    Care Manager                    Consultant:      _______________________________________________________________________  Expected  Disposition:   Home with Family x   HH/PT/OT/RN    SNF/LTC    BRIAN    ________________________________________________________________________  TOTAL TIME: 65Minutes    Critical Care Provided     Minutes non procedure based      Comments     Reviewed previous records   >50% of visit spent in counseling and coordination of care  Discussion with patient and/or family and questions answered       ________________________________________________________________________  Signed: Regina Srinivasan MD    Procedures: see electronic medical records for all procedures/Xrays and details which were not copied into this note but were reviewed prior to creation of Plan.     LAB DATA REVIEWED:    Recent Results (from the past 24 hour(s))   CBC WITH AUTOMATED DIFF    Collection Time: 06/10/20 11:55 PM   Result Value Ref Range    WBC 9.2 4.1 - 11.1 K/uL    RBC 4.74 4.10 - 5.70 M/uL    HGB 14.5 12.1 - 17.0 g/dL    HCT 41.7 36.6 - 50.3 %    MCV 88.0 80.0 - 99.0 FL    MCH 30.6 26.0 - 34.0 PG    MCHC 34.8 30.0 - 36.5 g/dL    RDW 13.2 11.5 - 14.5 %    PLATELET 993 245 - 690 K/uL    MPV 9.5 8.9 - 12.9 FL    NRBC 0.0 0  WBC ABSOLUTE NRBC 0.00 0.00 - 0.01 K/uL    NEUTROPHILS 46 32 - 75 %    LYMPHOCYTES 43 12 - 49 %    MONOCYTES 7 5 - 13 %    EOSINOPHILS 2 0 - 7 %    BASOPHILS 1 0 - 1 %    IMMATURE GRANULOCYTES 1 (H) 0.0 - 0.5 %    ABS. NEUTROPHILS 4.3 1.8 - 8.0 K/UL    ABS. LYMPHOCYTES 4.0 (H) 0.8 - 3.5 K/UL    ABS. MONOCYTES 0.6 0.0 - 1.0 K/UL    ABS. EOSINOPHILS 0.2 0.0 - 0.4 K/UL    ABS. BASOPHILS 0.1 0.0 - 0.1 K/UL    ABS. IMM. GRANS. 0.1 (H) 0.00 - 0.04 K/UL    DF AUTOMATED     METABOLIC PANEL, COMPREHENSIVE    Collection Time: 06/10/20 11:55 PM   Result Value Ref Range    Sodium 133 (L) 136 - 145 mmol/L    Potassium 3.2 (L) 3.5 - 5.1 mmol/L    Chloride 96 (L) 97 - 108 mmol/L    CO2 26 21 - 32 mmol/L    Anion gap 11 5 - 15 mmol/L    Glucose 109 (H) 65 - 100 mg/dL    BUN 19 6 - 20 MG/DL    Creatinine 1.56 (H) 0.70 - 1.30 MG/DL    BUN/Creatinine ratio 12 12 - 20      GFR est AA 56 (L) >60 ml/min/1.73m2    GFR est non-AA 46 (L) >60 ml/min/1.73m2    Calcium 8.8 8.5 - 10.1 MG/DL    Bilirubin, total 0.2 0.2 - 1.0 MG/DL    ALT (SGPT) 36 12 - 78 U/L    AST (SGOT) 24 15 - 37 U/L    Alk.  phosphatase 56 45 - 117 U/L    Protein, total 7.3 6.4 - 8.2 g/dL    Albumin 4.1 3.5 - 5.0 g/dL    Globulin 3.2 2.0 - 4.0 g/dL    A-G Ratio 1.3 1.1 - 2.2     ETHYL ALCOHOL    Collection Time: 06/10/20 11:55 PM   Result Value Ref Range    ALCOHOL(ETHYL),SERUM 200 (H) <27 MG/DL   SALICYLATE    Collection Time: 06/10/20 11:55 PM   Result Value Ref Range    Salicylate level 4.8 2.8 - 20.0 MG/DL   ACETAMINOPHEN    Collection Time: 06/10/20 11:55 PM   Result Value Ref Range    Acetaminophen level <2 (L) 10 - 30 ug/mL   EKG, 12 LEAD, INITIAL    Collection Time: 06/11/20 12:43 AM   Result Value Ref Range    Ventricular Rate 90 BPM    Atrial Rate 90 BPM    P-R Interval 162 ms    QRS Duration 104 ms    Q-T Interval 364 ms    QTC Calculation (Bezet) 445 ms    Calculated P Axis 47 degrees    Calculated R Axis -75 degrees    Calculated T Axis 62 degrees    Diagnosis Normal sinus rhythm  Incomplete right bundle branch block  Left anterior fascicular block  Abnormal ECG  When compared with ECG of 13-MAY-2015 18:05,  fusion complexes are no longer present  premature ventricular complexes are no longer present  premature atrial complexes are no longer present  Incomplete right bundle branch block is now present     LACTIC ACID    Collection Time: 06/11/20  1:10 AM   Result Value Ref Range    Lactic acid 3.2 (HH) 0.4 - 2.0 MMOL/L   DRUG SCREEN, URINE    Collection Time: 06/11/20  1:20 AM   Result Value Ref Range    AMPHETAMINES Negative NEG      BARBITURATES Positive (A) NEG      BENZODIAZEPINES Negative NEG      COCAINE Negative NEG      OPIATES Negative NEG      PCP(PHENCYCLIDINE) Negative NEG      THC (TH-CANNABINOL) Negative NEG      TRICYCLICS Negative NEG      DRUG SCREEN COMMENT (NOTE)    URINALYSIS W/ RFLX MICROSCOPIC    Collection Time: 06/11/20  1:20 AM   Result Value Ref Range    Color YELLOW/STRAW      Appearance CLEAR CLEAR      Specific gravity 1.025 1.003 - 1.030      pH (UA) 6.0 5.0 - 8.0      Protein Negative NEG mg/dL    Glucose Negative NEG mg/dL    Ketone Negative NEG mg/dL    Bilirubin Negative NEG      Blood Negative NEG      Urobilinogen 0.2 0.2 - 1.0 EU/dL    Nitrites Negative NEG      Leukocyte Esterase Negative NEG     POC EG7    Collection Time: 06/11/20  1:28 AM   Result Value Ref Range    Calcium, ionized (POC) 1.09 (L) 1.12 - 1.32 mmol/L    FIO2 (POC) 21 %    pH (POC) 7.358 7.35 - 7.45      pCO2 (POC) 42.2 35.0 - 45.0 MMHG    pO2 (POC) 76 (L) 80 - 100 MMHG    HCO3 (POC) 23.7 22 - 26 MMOL/L    Base deficit (POC) 2 mmol/L    sO2 (POC) 94 92 - 97 %    Site OTHER      Device: ROOM AIR      Allens test (POC) N/A      Specimen type (POC) VENOUS BLOOD      Total resp.  rate 14     LACTIC ACID    Collection Time: 06/11/20  3:38 AM   Result Value Ref Range    Lactic acid 4.9 (HH) 0.4 - 2.0 MMOL/L   ETHYL ALCOHOL    Collection Time: 06/11/20  3:46 AM   Result Value Ref Range    ALCOHOL(ETHYL),SERUM 144 (H) <10 MG/DL

## 2020-06-11 NOTE — CONSULTS
PSYCHIATRY CONSULT NOTE:    REASON FOR CONSULT:  Suicide attempt via overdose, medication and amount is unknown. Reports that the medications was prescribed to him and he took the medications over a series of 15 minutes  Depression    HISTORY OF PRESENTING COMPLAINT:  Meredith Dye is a 63 y.o. male admitted to the medical unit following an intentional overdose. Tobias Salgado reports that he has a h/o depression as well as a h/o SA with the latest attempt being in 2017 via OD. Tobias Salgado has been admitted to [facility](2009) and [facility](2013) [facility], [facility] previously as well. More recent, Tobias Salgado reports that his health has steadily been declining. As a result, he lost his housing in [place], [place] and was forced to move in with his brother in [place], [place]. These have also been contributing factors his depression and feelings that \"things would be better if he were not here\".       PAST PSYCHIATRIC HISTORY:  In Patient -multiple    SUBSTANCE ABUSE HISTORY:  Social History     Substance and Sexual Activity   Drug Use Yes    Types: Prescription    Comment: Lights, past IV drug user     Social History     Substance and Sexual Activity   Alcohol Use Not Currently    Alcohol/week: 0.0 standard drinks    Comment: Hx of abuse- denies use tonight-smells of ETOH     Social History     Tobacco Use   Smoking Status Current Every Day Smoker    Packs/day: 1.00    Types: Cigarettes    Last attempt to quit: 9/1/2016    Years since quitting: 3.7   Smokeless Tobacco Never Used       PAST MEDICAL HISTORY:  Past Medical History:   Diagnosis Date    Chronic pain     Diabetes ([facility] [address]     Hepatitis C     Hypertension        SOCIAL HISTORY:  Tobacco/alcohol/caffeine: alcohol intake:history unreliable    VITALS:  Visit Vitals  /55 (BP 1 Location: Left arm, BP Patient Position: At rest)   Pulse 95   Temp (!) 94.6 °F (34.8 °C)   Resp 18   SpO2 97%       MEDICATIONS:    Current Facility-Administered Medications:     LORazepam (ATIVAN) injection 2 mg, 2 mg, IntraVENous, Q1H PRN, Jerry Hernandez MD    LORazepam (ATIVAN) injection 4 mg, 4 mg, IntraVENous, Q1H PRN, Jerry Hernandez MD    [START ON 6/12/2020] 0.9% sodium chloride 4,579 mL with folic acid 1 mg, thiamine 100 mg, mvi, adult no. 4 with vit K 10 mL infusion, , IntraVENous, DAILY, Jerry Hernandez MD    [START ON 6/12/2020] aspirin delayed-release tablet 81 mg, 81 mg, Oral, DAILY, Jerry Hernandez MD    atorvastatin (LIPITOR) tablet 10 mg, 10 mg, Oral, QHS, Jerry Hernandez MD    [START ON 2/13/0252] folic acid (FOLVITE) tablet 1 mg, 1 mg, Oral, DAILY, Jerry Hernandez MD    [START ON 6/12/2020] sertraline (ZOLOFT) tablet 50 mg, 50 mg, Oral, DAILY, Jerry Hernandez MD    insulin lispro (HUMALOG) injection, , SubCUTAneous, AC&HS, Jerry Hernandez MD    glucose chewable tablet 16 g, 4 Tab, Oral, PRN, Jerry Hernandez MD    dextrose (D50W) injection syrg 12.5-25 g, 12.5-25 g, IntraVENous, PRN, Jerry Hernandez MD    glucagon (GLUCAGEN) injection 1 mg, 1 mg, IntraMUSCular, PRN, Jerry Hernandez MD    hydrALAZINE (APRESOLINE) 20 mg/mL injection 10 mg, 10 mg, IntraVENous, Q6H PRN, Jerry Hernandez MD    sodium chloride (NS) flush 5-40 mL, 5-40 mL, IntraVENous, Q8H, Jerry Hernandez MD    sodium chloride (NS) flush 5-40 mL, 5-40 mL, IntraVENous, PRN, Jerry Hernandez MD    ondansetron (ZOFRAN ODT) tablet 4 mg, 4 mg, Oral, Q6H PRN, Jerry Hernandez MD    nicotine (NICODERM CQ) 14 mg/24 hr patch 1 Patch, 1 Patch, TransDERmal, DAILY, Jerry Hernandez MD    heparin (porcine) injection 5,000 Units, 5,000 Units, SubCUTAneous, Q8H, Jerry Hernandez MD    [START ON 6/12/2020] thiamine mononitrate (B-1) tablet 100 mg, 100 mg, Oral, DAILY, Jerry Hernandez MD    pantoprazole (PROTONIX) tablet 40 mg, 40 mg, Oral, ACB, Jerry Hernandez MD    MENTAL STATUS EXAM:  Blunted and Depressed  Oriented in all spheres  Alert and oriented to all spheres  Goal directed and Logical    ASSESSMENT AND PLAN:  Bipolar, Depressed, No diagnosis , Problems with primary support group and Housing problems  and 51-60 moderate symptoms    Recommendations  1. Transer to inpatient BHU when medically cleared, Amitajanel Snow is willing to be admitted voluntarily      Nate Shaw 35  6/11/2020

## 2020-06-11 NOTE — PROGRESS NOTES
Called for lactic acidosis. Seems due to renal failure with drug overdose and due to metformin. Bicarb seems to be trending down as well. Will start Bicarb drip and request nephrology consult. Will start IV thiamine as per chart review history of alcohol abuse.

## 2020-06-12 ENCOUNTER — APPOINTMENT (OUTPATIENT)
Dept: ULTRASOUND IMAGING | Age: 60
DRG: 918 | End: 2020-06-12
Attending: INTERNAL MEDICINE
Payer: MEDICARE

## 2020-06-12 LAB
ALBUMIN SERPL-MCNC: 3.1 G/DL (ref 3.5–5)
ALBUMIN SERPL-MCNC: 3.2 G/DL (ref 3.5–5)
ALBUMIN/GLOB SERPL: 1 {RATIO} (ref 1.1–2.2)
ALBUMIN/GLOB SERPL: 1 {RATIO} (ref 1.1–2.2)
ALP SERPL-CCNC: 45 U/L (ref 45–117)
ALP SERPL-CCNC: 49 U/L (ref 45–117)
ALT SERPL-CCNC: 28 U/L (ref 12–78)
ALT SERPL-CCNC: 30 U/L (ref 12–78)
ANION GAP SERPL CALC-SCNC: 11 MMOL/L (ref 5–15)
ANION GAP SERPL CALC-SCNC: 5 MMOL/L (ref 5–15)
ARTERIAL PATENCY WRIST A: YES
AST SERPL-CCNC: 16 U/L (ref 15–37)
AST SERPL-CCNC: 18 U/L (ref 15–37)
BASE EXCESS BLD CALC-SCNC: 2 MMOL/L
BDY SITE: ABNORMAL
BILIRUB DIRECT SERPL-MCNC: 0.1 MG/DL (ref 0–0.2)
BILIRUB SERPL-MCNC: 0.2 MG/DL (ref 0.2–1)
BILIRUB SERPL-MCNC: 0.6 MG/DL (ref 0.2–1)
BUN SERPL-MCNC: 34 MG/DL (ref 6–20)
BUN SERPL-MCNC: 35 MG/DL (ref 6–20)
BUN/CREAT SERPL: 13 (ref 12–20)
BUN/CREAT SERPL: 16 (ref 12–20)
CA-I BLD-SCNC: 1.09 MMOL/L (ref 1.12–1.32)
CALCIUM SERPL-MCNC: 7.5 MG/DL (ref 8.5–10.1)
CALCIUM SERPL-MCNC: 7.6 MG/DL (ref 8.5–10.1)
CHLORIDE SERPL-SCNC: 104 MMOL/L (ref 97–108)
CHLORIDE SERPL-SCNC: 106 MMOL/L (ref 97–108)
CO2 SERPL-SCNC: 19 MMOL/L (ref 21–32)
CO2 SERPL-SCNC: 28 MMOL/L (ref 21–32)
COMMENT, HOLDF: NORMAL
CREAT SERPL-MCNC: 2.22 MG/DL (ref 0.7–1.3)
CREAT SERPL-MCNC: 2.52 MG/DL (ref 0.7–1.3)
CREAT UR-MCNC: 49.1 MG/DL
EOSINOPHIL #/AREA URNS HPF: NEGATIVE /[HPF]
ERYTHROCYTE [DISTWIDTH] IN BLOOD BY AUTOMATED COUNT: 13.6 % (ref 11.5–14.5)
ERYTHROCYTE [DISTWIDTH] IN BLOOD BY AUTOMATED COUNT: 13.7 % (ref 11.5–14.5)
EST. AVERAGE GLUCOSE BLD GHB EST-MCNC: 146 MG/DL
GAS FLOW.O2 O2 DELIVERY SYS: ABNORMAL L/MIN
GLOBULIN SER CALC-MCNC: 3.1 G/DL (ref 2–4)
GLOBULIN SER CALC-MCNC: 3.2 G/DL (ref 2–4)
GLUCOSE BLD STRIP.AUTO-MCNC: 112 MG/DL (ref 65–100)
GLUCOSE BLD STRIP.AUTO-MCNC: 128 MG/DL (ref 65–100)
GLUCOSE BLD STRIP.AUTO-MCNC: 134 MG/DL (ref 65–100)
GLUCOSE BLD STRIP.AUTO-MCNC: 187 MG/DL (ref 65–100)
GLUCOSE BLD STRIP.AUTO-MCNC: 81 MG/DL (ref 65–100)
GLUCOSE SERPL-MCNC: 104 MG/DL (ref 65–100)
GLUCOSE SERPL-MCNC: 132 MG/DL (ref 65–100)
HBA1C MFR BLD: 6.7 % (ref 4–5.6)
HCO3 BLD-SCNC: 26.5 MMOL/L (ref 22–26)
HCT VFR BLD AUTO: 36.5 % (ref 36.6–50.3)
HCT VFR BLD AUTO: 37.6 % (ref 36.6–50.3)
HGB BLD-MCNC: 12.6 G/DL (ref 12.1–17)
HGB BLD-MCNC: 12.8 G/DL (ref 12.1–17)
LACTATE SERPL-SCNC: 1.7 MMOL/L (ref 0.4–2)
LACTATE SERPL-SCNC: 2.4 MMOL/L (ref 0.4–2)
MCH RBC QN AUTO: 30.4 PG (ref 26–34)
MCH RBC QN AUTO: 30.8 PG (ref 26–34)
MCHC RBC AUTO-ENTMCNC: 33.5 G/DL (ref 30–36.5)
MCHC RBC AUTO-ENTMCNC: 35.1 G/DL (ref 30–36.5)
MCV RBC AUTO: 87.7 FL (ref 80–99)
MCV RBC AUTO: 90.8 FL (ref 80–99)
NRBC # BLD: 0 K/UL (ref 0–0.01)
NRBC # BLD: 0 K/UL (ref 0–0.01)
NRBC BLD-RTO: 0 PER 100 WBC
NRBC BLD-RTO: 0 PER 100 WBC
PCO2 BLD: 41.1 MMHG (ref 35–45)
PH BLD: 7.42 [PH] (ref 7.35–7.45)
PLATELET # BLD AUTO: 179 K/UL (ref 150–400)
PLATELET # BLD AUTO: 200 K/UL (ref 150–400)
PMV BLD AUTO: 9.5 FL (ref 8.9–12.9)
PMV BLD AUTO: 9.9 FL (ref 8.9–12.9)
PO2 BLD: 80 MMHG (ref 80–100)
POTASSIUM SERPL-SCNC: 3.5 MMOL/L (ref 3.5–5.1)
POTASSIUM SERPL-SCNC: 4.2 MMOL/L (ref 3.5–5.1)
PROT SERPL-MCNC: 6.3 G/DL (ref 6.4–8.2)
PROT SERPL-MCNC: 6.3 G/DL (ref 6.4–8.2)
PROT UR-MCNC: 10 MG/DL (ref 0–11.9)
PROT/CREAT UR-RTO: 0.2
RBC # BLD AUTO: 4.14 M/UL (ref 4.1–5.7)
RBC # BLD AUTO: 4.16 M/UL (ref 4.1–5.7)
SAMPLES BEING HELD,HOLD: NORMAL
SAO2 % BLD: 96 % (ref 92–97)
SERVICE CMNT-IMP: ABNORMAL
SERVICE CMNT-IMP: NORMAL
SODIUM SERPL-SCNC: 136 MMOL/L (ref 136–145)
SODIUM SERPL-SCNC: 137 MMOL/L (ref 136–145)
SODIUM UR-SCNC: 75 MMOL/L
SPECIMEN TYPE: ABNORMAL
TSH SERPL DL<=0.05 MIU/L-ACNC: 0.07 UIU/ML (ref 0.36–3.74)
WBC # BLD AUTO: 11.1 K/UL (ref 4.1–11.1)
WBC # BLD AUTO: 5.3 K/UL (ref 4.1–11.1)

## 2020-06-12 PROCEDURE — 74011250636 HC RX REV CODE- 250/636: Performed by: INTERNAL MEDICINE

## 2020-06-12 PROCEDURE — 82962 GLUCOSE BLOOD TEST: CPT

## 2020-06-12 PROCEDURE — 83605 ASSAY OF LACTIC ACID: CPT

## 2020-06-12 PROCEDURE — 83036 HEMOGLOBIN GLYCOSYLATED A1C: CPT

## 2020-06-12 PROCEDURE — 82803 BLOOD GASES ANY COMBINATION: CPT

## 2020-06-12 PROCEDURE — 36600 WITHDRAWAL OF ARTERIAL BLOOD: CPT

## 2020-06-12 PROCEDURE — 80053 COMPREHEN METABOLIC PANEL: CPT

## 2020-06-12 PROCEDURE — 85027 COMPLETE CBC AUTOMATED: CPT

## 2020-06-12 PROCEDURE — 65660000000 HC RM CCU STEPDOWN

## 2020-06-12 PROCEDURE — 82248 BILIRUBIN DIRECT: CPT

## 2020-06-12 PROCEDURE — 84300 ASSAY OF URINE SODIUM: CPT

## 2020-06-12 PROCEDURE — 74011250636 HC RX REV CODE- 250/636: Performed by: NURSE PRACTITIONER

## 2020-06-12 PROCEDURE — 87205 SMEAR GRAM STAIN: CPT

## 2020-06-12 PROCEDURE — 74011000258 HC RX REV CODE- 258: Performed by: INTERNAL MEDICINE

## 2020-06-12 PROCEDURE — 84156 ASSAY OF PROTEIN URINE: CPT

## 2020-06-12 PROCEDURE — 36415 COLL VENOUS BLD VENIPUNCTURE: CPT

## 2020-06-12 PROCEDURE — 76770 US EXAM ABDO BACK WALL COMP: CPT

## 2020-06-12 PROCEDURE — 74011250637 HC RX REV CODE- 250/637: Performed by: INTERNAL MEDICINE

## 2020-06-12 PROCEDURE — 84443 ASSAY THYROID STIM HORMONE: CPT

## 2020-06-12 RX ORDER — SODIUM CHLORIDE 9 MG/ML
125 INJECTION, SOLUTION INTRAVENOUS CONTINUOUS
Status: DISCONTINUED | OUTPATIENT
Start: 2020-06-12 | End: 2020-06-13

## 2020-06-12 RX ADMIN — Medication 10 ML: at 06:14

## 2020-06-12 RX ADMIN — SERTRALINE HYDROCHLORIDE 50 MG: 50 TABLET ORAL at 08:13

## 2020-06-12 RX ADMIN — HEPARIN SODIUM 5000 UNITS: 5000 INJECTION INTRAVENOUS; SUBCUTANEOUS at 06:13

## 2020-06-12 RX ADMIN — THIAMINE HCL TAB 100 MG 100 MG: 100 TAB at 08:20

## 2020-06-12 RX ADMIN — Medication 10 ML: at 13:19

## 2020-06-12 RX ADMIN — FOLIC ACID 1 MG: 1 TABLET ORAL at 08:13

## 2020-06-12 RX ADMIN — SODIUM CHLORIDE 125 ML/HR: 900 INJECTION, SOLUTION INTRAVENOUS at 17:21

## 2020-06-12 RX ADMIN — HEPARIN SODIUM 5000 UNITS: 5000 INJECTION INTRAVENOUS; SUBCUTANEOUS at 21:08

## 2020-06-12 RX ADMIN — THIAMINE HYDROCHLORIDE 300 MG: 100 INJECTION, SOLUTION INTRAMUSCULAR; INTRAVENOUS at 08:20

## 2020-06-12 RX ADMIN — PANTOPRAZOLE SODIUM 40 MG: 40 TABLET, DELAYED RELEASE ORAL at 08:13

## 2020-06-12 RX ADMIN — ATORVASTATIN CALCIUM 10 MG: 10 TABLET, FILM COATED ORAL at 21:08

## 2020-06-12 RX ADMIN — HEPARIN SODIUM 5000 UNITS: 5000 INJECTION INTRAVENOUS; SUBCUTANEOUS at 17:20

## 2020-06-12 NOTE — PROGRESS NOTES
**Consult Information**  Member Facility: [facility] Yaquelin Butts MRN: [id number]  Consult ID: [id number]  Facility Time Zone: ET  Date and Time of Request: 06/12/2020 04:56:51 AM  Requesting Clinician: Dat Daly  Patient Name: Fer Huerta  YOB: 1960  Gender: Male    **Clinical Note**  Clinical Note: Notified about patient's lactic acid trending downwards to 2.4 from 5.7. C/w current care. **Attestation**  Interaction Mode: Phone Only  Phone Duration (mins): 2  Time of Call : 06/12/2020 05:03 AM  Interaction Attestation: Interprofessional internet consultation was delivered through telephonic and/or electronic communication upon the request of the patients treating provider, while the requesting and the rendering provider were not in the same physical location. A written summary report was provided to the requesting provider at the originating site.   Evaluation Duration (mins): 2    **Physician Signature**  This document was electronically signed by: Lisa Caputo MD  06/12/2020 05:03 AM

## 2020-06-12 NOTE — PROGRESS NOTES
Reason for Admission: medication overdose, drug overdose                   RUR Score: 19%                 PCP: First and Last name:  Edu Terry   Name of Practice: [facility] [address] [facility]    Are you a current patient: Yes   Approximate date of last visit: 5/22/2020 for virtual visiti   Can you participate in a virtual visit if needed: Yes    Do you (patient/family) have any concerns for transition/discharge? Pt did not express any concerns at this time. Plan for utilizing home health:   Pt has no plans to utilize [facility] at this time. Current Advanced Directive/Advance Care Plan:  Pt does not have an ACP on file at this time. CM explained ACP's purpose and offered to complete one with pt. Pt declined to have ACP completed at this time. CM informed pt that he may have ACP completed while inpatient or at his PCP office. Transition of Care Plan:   CM met with pt to complete initial assessment and discuss d/c planning. CM verified pt demographic, insurance, and PCP information. Pt reports that he resides alone in [place], [place]. Pt reports that he is independent with ADLs/IADLs. He indicates that he does not utilize any DME. Pt has no prior [facility] or SNF experience. The patient states that he does not drive and he relies on others for transportation to \[facility]\"". Pt states that he has strong family support if needed. He states that his brother, Sergio Rodriguez lives nearby in [place]. He reports that his other brother, Baljeet Farley, resides in [place], [place]. Pt is active with his PCP and uses [facility] mail order for medication needs. Pt admitted for suicide attempt. Per chart review, pt has a hx of inpatient psych hospitalizations. Plan at this time is for inpatient psych treatment. Pt remained voluntary for placement at the time of this assessment. He gave this CM permission to contact his brother Connie Goodrich to keep him updated.       CM will continue to follow patient for discharge planning needs and arrange for services as deemed necessary. Care Management Interventions  PCP Verified by CM: Yes  Mode of Transport at Discharge:  Other (see comment)  Transition of Care Consult (CM Consult): Discharge Planning  Current Support Network: Lives Alone  Confirm Follow Up Transport: Other (see comment)  The Plan for Transition of Care is Related to the Following Treatment Goals : Inpatient Psych   The Patient and/or Patient Representative was Provided with a Choice of Provider and Agrees with the Discharge Plan?: Yes  Name of the Patient Representative Who was Provided with a Choice of Provider and Agrees with the Discharge Plan: Self     Lobo Herron, Care Manager  [phone]

## 2020-06-12 NOTE — PROGRESS NOTES
Bedside shift change report given to Sonja Cadet (oncoming nurse) by Seven El (offgoing nurse). Report included the following information SBAR, Kardex, Intake/Output, MAR, Recent Results and Cardiac Rhythm NSR.    2115  CIWA 7.    2130  Patient took off [facility] [facility] [employer]. Patient no longer feels cold. Patient T 97.0.    2300  Nurse assessed patient for suicidal ideation. Patient admits that he wanted to kill himself. He states nothing has changed about wanting to kill himself since admit. 0200  CIWA 1.    0420  Patient lactic acid redrawn. Result 2.4, down from 5.7. Telehospitalist paged. No new orders per Dr. Julia Dee. 0615  CIWA 0.    Bedside shift change report given to Seven El (oncoming nurse) by Sonja Cadet (offgoing nurse). Report included the following information SBAR, Kardex, Intake/Output, MAR, Recent Results and Cardiac Rhythm NSR.

## 2020-06-12 NOTE — PROGRESS NOTES
Pharmacy Medication Reconciliation     The patient was interviewed regarding current PTA medication list, use and drug allergies; The patient was contact via telephone. The patient was questioned regarding use of any other inhalers, topical products, over the counter medications, herbal medications, vitamin products or ophthalmic/nasal/otic medication use. Allergy Update: Tramadol and Lisinopril    Recommendations/Findings: The following amendments were made to the patient's active medication list on file at [address]:   1) Additions: none    2) Deletions: Flexeril, Robaxin, Lidocaine cream    3) Changes: none      Pertinent Findings: Patient was able to tell me that he is taking metformin, gabapentin, folic acid, atorvastatin, and thiamine. Upon further questioning, he confirmed that he was still taking aspirin, Voltaren, Latuda, Zoloft, Diovan, and amlodipine. He stated that he has been taking all of his medications (Most of the last fills in RX query were 4/1/2020). -Clarified PTA med list with patient. PTA medication list was corrected to the following:     Prior to Admission Medications   Prescriptions Last Dose Informant Taking? Blood-Glucose Meter monitoring kit  Self Yes   Sig: Dx:e11.9, not on insulin; check sugar daily as directed. amLODIPine (NORVASC) 5 mg tablet  at Unknown time Self Yes   Sig: Take 1 Tab by mouth daily. Indications: pressure   aspirin delayed-release 81 mg tablet  at Unknown time Self Yes   Sig: Take 1 Tab by mouth daily. Indications: prevent heart problem   atorvastatin (LIPITOR) 10 mg tablet  at Unknown time Self Yes   Sig: Take 1 Tab by mouth daily. Indications: prevent stroke   diclofenac (VOLTAREN) 1 % gel  at Unknown time Self Yes   Sig: AAA in thin coat BID as needed for painful joints   folic acid (FOLVITE) 1 mg tablet  at Unknown time Self Yes   Sig: Take 1 Tab by mouth daily.    gabapentin (NEURONTIN) 800 mg tablet  at Unknown time Self Yes   Sig: Take 1 Tab by mouth three (3) times daily. Max Daily Amount: 2,400 mg. Indications: nerves   glucose blood VI test strips (ASCENSIA AUTODISC VI, ONE TOUCH ULTRA TEST VI) strip  Self Yes   Sig: Use to check sugar daily dx diabetes e11.9, not on insulin. lurasidone (LATUDA) 80 mg tab tablet  at Unknown time Self Yes   Si tabs with dinner daily  Indications: schizophrenia   metFORMIN (GLUCOPHAGE) 1,000 mg tablet  at Unknown time Self Yes   Sig: Take 1 Tab by mouth two (2) times daily (with meals). Indications: type 2 diabetes mellitus   sertraline (ZOLOFT) 50 mg tablet  at Unknown time Self Yes   Sig: Take 1 Tab by mouth daily. Indications: nerves   thiamine HCL (B-1) 100 mg tablet  at Unknown time Self Yes   Sig: Take 1 Tab by mouth daily. valsartan (DIOVAN) 160 mg tablet  at Unknown time Self Yes   Sig: Take 1 Tab by mouth daily.  Indications: pressure      Facility-Administered Medications: None          Thank you,  Ashley Griffith, PHARMD

## 2020-06-12 NOTE — PROGRESS NOTES
Hospitalist Progress Note    NAME: Scott Lo   :  1960   MRN:  [id number]   Room Number:  2271/01  @ [facility]       Interim Hospital Summary: 63 y.o. male whom presented on 2020 with      Assessment / Plan:  Transfer patient to inpatient psych at [facility] once patient is stable. Suicidal attempt  Medication overdose: Reported ibuprofen, amlodipine and metformin  Depression  We will admit to stepdown unit, suicide precaution, monitor blood pressure, negative for GI bleed or worsening epigastric pain  We will consult psychiatry  -Continue Zoloft. Will hold restarting Latuda until renal status improves.      Lactic acidosis- improving  Hypothermia- Resolved   -Noted temp 94.6 on admission. Temp now 97.8 without Luciano hugger  -Blood cultures x 2- NGTD. Continue to monitor and trend  -Lactic acid improving 5.7 > 4.9 > 3.2. LA now 2.4. Will repeat lactic acid in am.  -Luciano hugger PRN     Acute Kidney Injury  -likely due to medication overdose  -Cr 2.52, worsening. 2.22 > 2.24 on admission  -Repeat CMP in the am. Continue to monitor    Alcohol abuse  Concern for alcohol withdrawal  Tobacco abuse  -Completed banana bag. -CIWA protocol. CIWA now 0  -Nicoderm CQ 21 mg/24 TD patch  Thiamine and folate     Hypertension  -Resume antihypertensive Norvasc today    Diabetes mellitus Type II  -Continue with sliding scale insulin  -Check Hbg A1C   -Check TSH    Code Status: Full code  Surrogate Decision Maker:     DVT Prophylaxis: Heparin SQ  GI Prophylaxis: PPI  Baseline: Ambulatory  Recommended Disposition: Inpatient Psychiatry     Subjective:     Chief Complaint / Reason for Physician Visit  \"I'm still depressed and think about suicide\". Patient is resting in bed with sitter at bedside. Discussed with RN events overnight.      Review of Systems:            Symptom Y/N Comments   Symptom Y/N Comments   Fever/Chills N     Chest Pain N      Poor Appetite N     Edema N      Cough N Abdominal Pain N      Sputum N     Joint Pain N      SOB/CORONADO N     Pruritis/Rash N      Nausea/vomit N     Tolerating PT/OT        Diarrhea N     Tolerating Diet Y      Constipation N     Other           Could NOT obtain due to:         Objective:     VITALS:   Last 24hrs VS reviewed since prior progress note. Most recent are:  Patient Vitals for the past 24 hrs:   Temp Pulse Resp BP SpO2   06/12/20 1133  86  138/85    06/12/20 1132 97.8 °F (36.6 °C) 86 18 138/85 99 %   06/12/20 0730 98.4 °F (36.9 °C) 93 18 153/77 99 %   06/12/20 0302 97.7 °F (36.5 °C) 87 18 128/80 98 %   06/11/20 2304 97.4 °F (36.3 °C) (!) 104 16 123/72 100 %   06/11/20 2130 97 °F (36.1 °C)       06/11/20 1900 97.5 °F (36.4 °C) 94 18 138/80 100 %       Intake/Output Summary (Last 24 hours) at 6/12/2020 1610  Last data filed at 6/12/2020 1132  Gross per 24 hour   Intake 1425 ml   Output    Net 1425 ml        PHYSICAL EXAM:  General: WD, WN. Alert, cooperative, no acute distress    EENT:  EOMI. Anicteric sclerae. MMM  Resp:  CTA bilaterally, no wheezing or rales. No accessory muscle use  CV:  Regular  rhythm,  normal S1/S2, no murmurs rubs gallops, No edema  GI:  Soft, Non distended, Non tender. +Bowel sounds  Neurologic:  Alert and oriented X 3, normal speech,   Psych:   Depressed. Suicidal ideations. Not anxious nor agitated  Skin:  No rashes. No jaundice    Reviewed most current lab test results and cultures  YES  Reviewed most current radiology test results   YES  Review and summation of old records today    NO  Reviewed patient's current orders and MAR    YES  PMH/SH reviewed - no change compared to H&P  ________________________________________________________________________  Care Plan discussed with:    Comments   Patient X    Family      RN X    Care Manager X    Consultant                        Multidiciplinary team rounds were held today with , nursing, pharmacist and clinical coordinator.   Patient's plan of care was discussed; medications were reviewed and discharge planning was addressed. ________________________________________________________________________  Total NON critical care TIME:  35  Minutes    Total CRITICAL CARE TIME Spent:   Minutes non procedure based      Comments   >50% of visit spent in counseling and coordination of care     ________________________________________________________________________  Marc Flannery NP     Procedures: see electronic medical records for all procedures/Xrays and details which were not copied into this note but were reviewed prior to creation of Plan. LABS:  I reviewed today's most current labs and imaging studies.   Pertinent labs include:  Recent Labs     06/12/20  1553 06/12/20 0317 06/11/20  1714   WBC 5.3 11.1 17.8*   HGB 12.8 12.6 15.8   HCT 36.5* 37.6 48.7    200 231     Recent Labs     06/12/20  0317 06/11/20  1714 06/10/20  2355    138 133*   K 4.2 4.3 3.2*    104 96*   CO2 19* 20* 26   * 56* 109*   BUN 35* 29* 19   CREA 2.22* 2.24* 1.56*   CA 7.6* 9.3 8.8   ALB 3.2*  --  4.1   TBILI 0.2  --  0.2   ALT 30  --  36       Signed: Marc Flannery NP

## 2020-06-12 NOTE — PROGRESS NOTES
0700: Bedside shift change report given to [facility] (oncoming nurse) by Susana Zhu (offgoing nurse). Report included the following information SBAR, Kardex, Intake/Output, MAR, Recent Results, Med Rec Status and Cardiac Rhythm NSR.     1123: Patient sleeping and lethargic all morning, calm and cooperative but not engaging. VSS, lactic acid trending down. Spoke with NP and she will order another lactic acid to make sure it is continuing to trend down. Discussed with her plans for patient to be transferred to [address] [place] [address] for psych. \    [phone]: Patient off floor via wheelchair with sitter for procedure. 1815: Patient back on PCU.    1900: Bedside shift change report given to oncoming nurse by [facility] (offgoing nurse). Report included the following information SBAR, Kardex, ED Summary, Procedure Summary, Intake/Output, MAR, Recent Results, Med Rec Status and Cardiac Rhythm NSR.

## 2020-06-12 NOTE — CONSULTS
Nephrology Consult Note     Kameron Gillespie     www. [email or web address]              Phone - [phone]   Patient: Tam Tam   YOB: 1960    Date- 6/12/2020  MRN: [id number]             REASON FOR CONSULTATION: RILEY   CONSULTING PHYSICIAN: DR. NEGRETE    ADMIT DATE:6/11/2020 PATIENT PCP:Isabela Vazquez MD     IMPRESSION:   .Acute Kidney injury secondary to multiple possibilities. NSAIDS USE + LOW BP + diovan use       METABOLIC ACIDOSIS- lactic acidosis    Suicidal attempt  Medication overdose: Reported ibuprofen, amlodipine and metformin    ALCOHOL ABUSE    H/o hypertension  DM 2  HEP C     PLAN:   Continue bicarb gtt  Get renal usg  Check urine lytes  Check bmp in am   Hold diovan and norvasc  Hold metformin  Check serum osmo - to calculate osmolar gap  Avoid HYPOTENSION  AVOID NSAIDS  Supportive care     · Active Problems:  ·   Medication overdose (6/11/2020)  ·   ·     [x] High complexity decision making was performed  [x] Patient is at high-risk of decompensation with multiple organ involvement    Subjective:   HPI: Tam Tam is a 63 y.o.  male. HE WAS TX FROM  [facility] with c/o suicidal attempt. He took ibuprofen, metformin, norvasc,   His bp was very low at OSH. He was give fluid bolus prior to tx  His cr. 2.2 and bicarb 20 on admisison  His cr 0.94 in nove 2020  He has been on diovan   His lactic acid level on admission 3.2  He has been taking ibuprofen on chronic basis  He denies taking any abx  He denies vomiting or diarrhea  No dysuria or hematuria. He has h/o hepc    Review of Systems:       A 11 point review of system was performed today. Pertinent positives and negatives are mentioned in the HPI. The reminder of the ROS is negative and noncontributory. Past Medical History:   Diagnosis Date    Chronic pain     Diabetes ([address]     Hepatitis C     Hypertension       No past surgical history on file.    Prior to Admission medications Medication Sig Start Date End Date Taking? Authorizing Provider   valsartan (DIOVAN) 160 mg tablet Take 1 Tab by mouth daily. Indications: pressure 5/22/20   Freda Vazquez MD   atorvastatin (LIPITOR) 10 mg tablet Take 1 Tab by mouth daily. Indications: prevent stroke 5/22/20   Antwan Guerrero MD   amLODIPine (NORVASC) 5 mg tablet Take 1 Tab by mouth daily. Indications: pressure 5/22/20   Antwan Guerrero MD   lurasidone (LATUDA) 80 mg tab tablet 2 tabs with dinner daily  Indications: schizophrenia 5/22/20   Antwan Guerrero MD   metFORMIN (GLUCOPHAGE) 1,000 mg tablet Take 1 Tab by mouth two (2) times daily (with meals). Indications: type 2 diabetes mellitus 5/22/20   Antwan Guerrero MD   gabapentin (NEURONTIN) 800 mg tablet Take 1 Tab by mouth three (3) times daily. Max Daily Amount: 2,400 mg. Indications: nerves 5/22/20   Antwan Guerrero MD   diclofenac (VOLTAREN) 1 % gel AAA in thin coat BID as needed for painful joints 5/22/20   Freda Vazquez MD   sertraline (ZOLOFT) 50 mg tablet Take 1 Tab by mouth daily. Indications: nerves 5/22/20   Freda Vazquez MD   glucose blood VI test strips (ASCENSIA AUTODISC VI, ONE TOUCH ULTRA TEST VI) strip Use to check sugar daily dx diabetes e11.9, not on insulin. 5/22/20   Antwan Guerrero MD   Blood-Glucose Meter monitoring kit Dx:e11.9, not on insulin; check sugar daily as directed. 5/22/20   Antwan Guerrero MD   ibuprofen (MOTRIN) 800 mg tablet Take 1 Tab by mouth three (3) times daily as needed for Pain. 5/22/20   Freda Vazquez MD   cyclobenzaprine (FLEXERIL) 10 mg tablet Take 1 Tab by mouth three (3) times daily as needed for Muscle Spasm(s). 5/21/20   Tony Aburto MD   folic acid (FOLVITE) 1 mg tablet Take 1 Tab by mouth daily. 5/1/20   Antwan Guerrero MD   thiamine HCL (B-1) 100 mg tablet Take 1 Tab by mouth daily.  3/2/20   Antwan Guerrero MD   aspirin delayed-release 81 mg tablet Take 1 Tab by mouth daily. Indications: prevent heart problem 3/2/20   Ninoska Alex MD   methocarbamol (ROBAXIN) 750 mg tablet Take 1 Tab by mouth four (4) times daily. 1/4/20   Christiano Greenberg MD   lidocaine (XYLOCAINE) 4 % topical cream Apply  to affected area three (3) times daily as needed for Pain. 7/28/19   MAT Mancini     Allergies   Allergen Reactions    Tramadol Nausea and Vomiting    Lisinopril Cough     Developed cough while taking lisinopril      Social History     Tobacco Use    Smoking status: Current Every Day Smoker     Packs/day: 1.00     Types: Cigarettes     Last attempt to quit: 9/1/2016     Years since quitting: 3.7    Smokeless tobacco: Never Used   Substance Use Topics    Alcohol use: Not Currently     Alcohol/week: 0.0 standard drinks     Comment: Hx of abuse- denies use tonight-smells of ETOH      No family history on file. Objective:      Patient Vitals for the past 24 hrs:   Temp Pulse Resp BP SpO2   06/12/20 0730 98.4 °F (36.9 °C) 93 18 153/77 99 %   06/12/20 0302 97.7 °F (36.5 °C) 87 18 128/80 98 %   06/11/20 2304 97.4 °F (36.3 °C) (!) 104 16 123/72 100 %   06/11/20 2130 97 °F (36.1 °C)       06/11/20 1900 97.5 °F (36.4 °C) 94 18 138/80 100 %   06/11/20 1516 (!) 94.6 °F (34.8 °C) 95 18 105/55 97 %   06/11/20 1512  95 18 105/55 97 %   06/11/20 1303 98.1 °F (36.7 °C) 78 16 130/76 100 %     No intake/output data recorded.   Physical Exam:  General:Alert, No distress,   Eyes:No scleral icterus, No conjunctival pallor  Neck:Supple,no mass palpable,no thyromegaly  Lungs:Clears to auscultation Bilaterally, normal respiratory effort  CVS:RRR, S1 S2 normal,  No rub,  Abdomen:Soft, Non tender, No hepatosplenomegaly  Extremities: no LE edema  Skin:No rash or lesions, Warm and DRY   Psych: appropriate affect    :  No walden  Musculoskeletal : no redness, no joint tenderness  NEURO: Normal Speech, Non focal        CODE STATUS:  full  Care Plan discussed with:  patient     Chart reviewed.    y Reviewed previous records   y Discussion with patient and/or family and questions answered       ECG[de-identified] Rev:yes  Xray/CT/US/MRI REV:yes  Lab Data Personally Reviewed: (see below)  Recent Labs     06/12/20  0317 06/11/20  1714 06/10/20  2355   WBC 11.1 17.8* 9.2   HGB 12.6 15.8 14.5    231 234   ANEU  --  16.2* 4.3    138 133*   K 4.2 4.3 3.2*   * 56* 109*   BUN 35* 29* 19   CREA 2.22* 2.24* 1.56*   ALT 30  --  36   TBILI 0.2  --  0.2   AP 49  --  56   CA 7.6* 9.3 8.8     Lab Results   Component Value Date/Time    Color YELLOW/STRAW 06/11/2020 01:20 AM    Appearance CLEAR 06/11/2020 01:20 AM    Specific gravity 1.025 06/11/2020 01:20 AM    Specific gravity 1.015 12/01/2014 01:17 PM    pH (UA) 6.0 06/11/2020 01:20 AM    Protein Negative 06/11/2020 01:20 AM    Glucose Negative 06/11/2020 01:20 AM    Ketone Negative 06/11/2020 01:20 AM    Bilirubin Negative 06/11/2020 01:20 AM    Urobilinogen 0.2 06/11/2020 01:20 AM    Nitrites Negative 06/11/2020 01:20 AM    Leukocyte Esterase Negative 06/11/2020 01:20 AM    Epithelial cells FEW 05/11/2015 05:00 PM    Bacteria NEGATIVE  05/11/2015 05:00 PM    WBC 0-4 05/11/2015 05:00 PM    RBC 0-5 05/11/2015 05:00 PM       No results found for: IRON, FE, TIBC, IBCT, PSAT, FERR  Lab Results   Component Value Date/Time    Culture result: NO GROWTH AFTER 13 HOURS 06/11/2020 05:55 PM     Prior to Admission Medications   Prescriptions Last Dose Informant Patient Reported? Taking? Blood-Glucose Meter monitoring kit   No No   Sig: Dx:e11.9, not on insulin; check sugar daily as directed. amLODIPine (NORVASC) 5 mg tablet   No No   Sig: Take 1 Tab by mouth daily. Indications: pressure   aspirin delayed-release 81 mg tablet   No No   Sig: Take 1 Tab by mouth daily. Indications: prevent heart problem   atorvastatin (LIPITOR) 10 mg tablet   No No   Sig: Take 1 Tab by mouth daily.  Indications: prevent stroke   cyclobenzaprine (FLEXERIL) 10 mg tablet   No No Sig: Take 1 Tab by mouth three (3) times daily as needed for Muscle Spasm(s). diclofenac (VOLTAREN) 1 % gel   No No   Sig: AAA in thin coat BID as needed for painful joints   folic acid (FOLVITE) 1 mg tablet   No No   Sig: Take 1 Tab by mouth daily. gabapentin (NEURONTIN) 800 mg tablet   No No   Sig: Take 1 Tab by mouth three (3) times daily. Max Daily Amount: 2,400 mg. Indications: nerves   glucose blood VI test strips (ASCENSIA AUTODISC VI, ONE TOUCH ULTRA TEST VI) strip   No No   Sig: Use to check sugar daily dx diabetes e11.9, not on insulin. ibuprofen (MOTRIN) 800 mg tablet   No No   Sig: Take 1 Tab by mouth three (3) times daily as needed for Pain.   lidocaine (XYLOCAINE) 4 % topical cream   No No   Sig: Apply  to affected area three (3) times daily as needed for Pain.   lurasidone (LATUDA) 80 mg tab tablet   No No   Si tabs with dinner daily  Indications: schizophrenia   metFORMIN (GLUCOPHAGE) 1,000 mg tablet   No No   Sig: Take 1 Tab by mouth two (2) times daily (with meals). Indications: type 2 diabetes mellitus   methocarbamol (ROBAXIN) 750 mg tablet   No No   Sig: Take 1 Tab by mouth four (4) times daily. sertraline (ZOLOFT) 50 mg tablet   No No   Sig: Take 1 Tab by mouth daily. Indications: nerves   thiamine HCL (B-1) 100 mg tablet   No No   Sig: Take 1 Tab by mouth daily. valsartan (DIOVAN) 160 mg tablet   No No   Sig: Take 1 Tab by mouth daily. Indications: pressure      Facility-Administered Medications: None     Imaging:    Medications list Personally Reviewed   [x]      Yes     []               No    Thank you for allowing us to participate in the care this patient. We will follow patient with you.   Signed By: Sowmya Bernstein MD  [facility]  [facility] SYST FRANCISCape [facility] CenterCARE DIPIKA [address]  [place], [address]  Phone - [phone]         Fax - [phone] [facility] Office  [address] [place] [address]  Phone - [phone] Fax - [phone]     [email or web address]

## 2020-06-12 NOTE — PROGRESS NOTES
TRANSITION OF CARE PLAN:     Plan A: IP Psych     Update 2:40pm  CM informed that pt is medically stable for d/c to IP psych. CM contacted [facility] for bed placement. CM left VM and is awaiting a response. CM will continue to follow patient for discharge planning needs and arrange for services as deemed necessary. Update 11:18am  CM spoke with Katia Garcia at [facility] concerning bed placement. Nino suggests contacting bed placement once more when pt is medically stable. Psych will need to be consulted on pt again once he medically stable before he can be considered for placement. CM will continue to follow patient for discharge planning needs and arrange for services as deemed necessary. Initial Note:  CM completed chart review for pt. IP Psych placement recommended at this time. CM made 2 attempts to contact IP Psych at [facility]. CM left voicemail and is awaiting a call back. CM will continue to follow patient for discharge planning needs and arrange for services as deemed necessary.     Carmen Payton, Care Manager  [phone]

## 2020-06-13 ENCOUNTER — HOSPITAL ENCOUNTER (INPATIENT)
Age: 60
LOS: 4 days | Discharge: HOME | DRG: 885 | End: 2020-06-17
Attending: PSYCHIATRY & NEUROLOGY | Admitting: PSYCHIATRY & NEUROLOGY
Payer: MEDICARE

## 2020-06-13 VITALS
SYSTOLIC BLOOD PRESSURE: 144 MMHG | TEMPERATURE: 99.1 F | DIASTOLIC BLOOD PRESSURE: 91 MMHG | HEART RATE: 82 BPM | RESPIRATION RATE: 16 BRPM | OXYGEN SATURATION: 96 %

## 2020-06-13 DIAGNOSIS — R20.0 NUMBNESS AND TINGLING OF RIGHT ARM: Primary | ICD-10-CM

## 2020-06-13 DIAGNOSIS — R20.2 NUMBNESS AND TINGLING OF RIGHT ARM: Primary | ICD-10-CM

## 2020-06-13 PROBLEM — F19.94 SUBSTANCE INDUCED MOOD DISORDER (HCC): Status: ACTIVE | Noted: 2020-06-13

## 2020-06-13 LAB
ALBUMIN SERPL-MCNC: 3.5 G/DL (ref 3.5–5)
ALBUMIN/GLOB SERPL: 0.9 {RATIO} (ref 1.1–2.2)
ALP SERPL-CCNC: 48 U/L (ref 45–117)
ALT SERPL-CCNC: 30 U/L (ref 12–78)
ANION GAP SERPL CALC-SCNC: 3 MMOL/L (ref 5–15)
AST SERPL-CCNC: 17 U/L (ref 15–37)
BILIRUB SERPL-MCNC: 0.6 MG/DL (ref 0.2–1)
BUN SERPL-MCNC: 24 MG/DL (ref 6–20)
BUN/CREAT SERPL: 14 (ref 12–20)
CALCIUM SERPL-MCNC: 8 MG/DL (ref 8.5–10.1)
CHLORIDE SERPL-SCNC: 106 MMOL/L (ref 97–108)
CO2 SERPL-SCNC: 30 MMOL/L (ref 21–32)
CREAT SERPL-MCNC: 1.67 MG/DL (ref 0.7–1.3)
ERYTHROCYTE [DISTWIDTH] IN BLOOD BY AUTOMATED COUNT: 13.5 % (ref 11.5–14.5)
GLOBULIN SER CALC-MCNC: 3.7 G/DL (ref 2–4)
GLUCOSE BLD STRIP.AUTO-MCNC: 109 MG/DL (ref 65–100)
GLUCOSE BLD STRIP.AUTO-MCNC: 110 MG/DL (ref 65–100)
GLUCOSE BLD STRIP.AUTO-MCNC: 123 MG/DL (ref 65–100)
GLUCOSE SERPL-MCNC: 118 MG/DL (ref 65–100)
HCT VFR BLD AUTO: 36.4 % (ref 36.6–50.3)
HGB BLD-MCNC: 12.5 G/DL (ref 12.1–17)
LACTATE SERPL-SCNC: 0.9 MMOL/L (ref 0.4–2)
MCH RBC QN AUTO: 30.3 PG (ref 26–34)
MCHC RBC AUTO-ENTMCNC: 34.3 G/DL (ref 30–36.5)
MCV RBC AUTO: 88.3 FL (ref 80–99)
NRBC # BLD: 0 K/UL (ref 0–0.01)
NRBC BLD-RTO: 0 PER 100 WBC
OSMOLALITY SERPL: 302 MOSM/KG H2O
PLATELET # BLD AUTO: 172 K/UL (ref 150–400)
PMV BLD AUTO: 9.8 FL (ref 8.9–12.9)
POTASSIUM SERPL-SCNC: 4.1 MMOL/L (ref 3.5–5.1)
PROT SERPL-MCNC: 7.2 G/DL (ref 6.4–8.2)
RBC # BLD AUTO: 4.12 M/UL (ref 4.1–5.7)
SERVICE CMNT-IMP: ABNORMAL
SODIUM SERPL-SCNC: 139 MMOL/L (ref 136–145)
WBC # BLD AUTO: 4.9 K/UL (ref 4.1–11.1)

## 2020-06-13 PROCEDURE — 80053 COMPREHEN METABOLIC PANEL: CPT

## 2020-06-13 PROCEDURE — 82962 GLUCOSE BLOOD TEST: CPT

## 2020-06-13 PROCEDURE — 74011000258 HC RX REV CODE- 258: Performed by: INTERNAL MEDICINE

## 2020-06-13 PROCEDURE — 85027 COMPLETE CBC AUTOMATED: CPT

## 2020-06-13 PROCEDURE — 74011250637 HC RX REV CODE- 250/637: Performed by: INTERNAL MEDICINE

## 2020-06-13 PROCEDURE — 74011250636 HC RX REV CODE- 250/636: Performed by: INTERNAL MEDICINE

## 2020-06-13 PROCEDURE — 74011250637 HC RX REV CODE- 250/637: Performed by: NURSE PRACTITIONER

## 2020-06-13 PROCEDURE — 65220000003 HC RM SEMIPRIVATE PSYCH

## 2020-06-13 PROCEDURE — 36415 COLL VENOUS BLD VENIPUNCTURE: CPT

## 2020-06-13 PROCEDURE — 83605 ASSAY OF LACTIC ACID: CPT

## 2020-06-13 PROCEDURE — 83930 ASSAY OF BLOOD OSMOLALITY: CPT

## 2020-06-13 RX ORDER — OLANZAPINE 5 MG/1
5 TABLET ORAL
Status: DISCONTINUED | OUTPATIENT
Start: 2020-06-13 | End: 2020-06-17 | Stop reason: HOSPADM

## 2020-06-13 RX ORDER — HYDROXYZINE 50 MG/1
50 TABLET, FILM COATED ORAL
Status: DISCONTINUED | OUTPATIENT
Start: 2020-06-13 | End: 2020-06-17 | Stop reason: HOSPADM

## 2020-06-13 RX ORDER — POTASSIUM CHLORIDE 750 MG/1
40 TABLET, FILM COATED, EXTENDED RELEASE ORAL
Status: COMPLETED | OUTPATIENT
Start: 2020-06-13 | End: 2020-06-13

## 2020-06-13 RX ORDER — DIPHENHYDRAMINE HYDROCHLORIDE 50 MG/ML
50 INJECTION, SOLUTION INTRAMUSCULAR; INTRAVENOUS
Status: DISCONTINUED | OUTPATIENT
Start: 2020-06-13 | End: 2020-06-17 | Stop reason: HOSPADM

## 2020-06-13 RX ORDER — ATORVASTATIN CALCIUM 10 MG/1
10 TABLET, FILM COATED ORAL
Status: DISCONTINUED | OUTPATIENT
Start: 2020-06-13 | End: 2020-06-17 | Stop reason: HOSPADM

## 2020-06-13 RX ORDER — LORAZEPAM 2 MG/ML
1 INJECTION INTRAMUSCULAR
Status: DISCONTINUED | OUTPATIENT
Start: 2020-06-13 | End: 2020-06-17 | Stop reason: HOSPADM

## 2020-06-13 RX ORDER — SERTRALINE HYDROCHLORIDE 50 MG/1
50 TABLET, FILM COATED ORAL DAILY
Status: DISCONTINUED | OUTPATIENT
Start: 2020-06-14 | End: 2020-06-15

## 2020-06-13 RX ORDER — INSULIN LISPRO 100 [IU]/ML
INJECTION, SOLUTION INTRAVENOUS; SUBCUTANEOUS
Qty: 1 VIAL | Refills: 0 | Status: ON HOLD
Start: 2020-06-13 | End: 2020-06-15

## 2020-06-13 RX ORDER — FOLIC ACID 1 MG/1
1 TABLET ORAL DAILY
Status: DISCONTINUED | OUTPATIENT
Start: 2020-06-14 | End: 2020-06-17 | Stop reason: HOSPADM

## 2020-06-13 RX ORDER — ADHESIVE BANDAGE
30 BANDAGE TOPICAL DAILY PRN
Status: DISCONTINUED | OUTPATIENT
Start: 2020-06-13 | End: 2020-06-17 | Stop reason: HOSPADM

## 2020-06-13 RX ORDER — ACETAMINOPHEN 325 MG/1
650 TABLET ORAL
Status: DISCONTINUED | OUTPATIENT
Start: 2020-06-13 | End: 2020-06-17 | Stop reason: HOSPADM

## 2020-06-13 RX ORDER — LANOLIN ALCOHOL/MO/W.PET/CERES
100 CREAM (GRAM) TOPICAL DAILY
Status: DISCONTINUED | OUTPATIENT
Start: 2020-06-14 | End: 2020-06-17 | Stop reason: HOSPADM

## 2020-06-13 RX ORDER — FUROSEMIDE 40 MG/1
40 TABLET ORAL DAILY
Status: DISCONTINUED | OUTPATIENT
Start: 2020-06-14 | End: 2020-06-17 | Stop reason: HOSPADM

## 2020-06-13 RX ORDER — IBUPROFEN 200 MG
1 TABLET ORAL DAILY
Qty: 30 PATCH | Refills: 0 | Status: SHIPPED | OUTPATIENT
Start: 2020-06-14 | End: 2020-06-17

## 2020-06-13 RX ORDER — IBUPROFEN 200 MG
1 TABLET ORAL DAILY
Status: DISCONTINUED | OUTPATIENT
Start: 2020-06-14 | End: 2020-06-17 | Stop reason: HOSPADM

## 2020-06-13 RX ORDER — PANTOPRAZOLE SODIUM 40 MG/1
40 TABLET, DELAYED RELEASE ORAL
Status: DISCONTINUED | OUTPATIENT
Start: 2020-06-14 | End: 2020-06-17 | Stop reason: HOSPADM

## 2020-06-13 RX ORDER — MAGNESIUM SULFATE 100 %
4 CRYSTALS MISCELLANEOUS AS NEEDED
Status: DISCONTINUED | OUTPATIENT
Start: 2020-06-13 | End: 2020-06-17 | Stop reason: HOSPADM

## 2020-06-13 RX ORDER — FUROSEMIDE 40 MG/1
40 TABLET ORAL DAILY
Status: DISCONTINUED | OUTPATIENT
Start: 2020-06-13 | End: 2020-06-13 | Stop reason: HOSPADM

## 2020-06-13 RX ORDER — AMLODIPINE BESYLATE 5 MG/1
5 TABLET ORAL DAILY
Status: DISCONTINUED | OUTPATIENT
Start: 2020-06-14 | End: 2020-06-17 | Stop reason: HOSPADM

## 2020-06-13 RX ORDER — TRAZODONE HYDROCHLORIDE 50 MG/1
50 TABLET ORAL
Status: DISCONTINUED | OUTPATIENT
Start: 2020-06-13 | End: 2020-06-16

## 2020-06-13 RX ORDER — AMLODIPINE BESYLATE 5 MG/1
5 TABLET ORAL DAILY
Status: DISCONTINUED | OUTPATIENT
Start: 2020-06-13 | End: 2020-06-13 | Stop reason: HOSPADM

## 2020-06-13 RX ORDER — HALOPERIDOL 5 MG/ML
5 INJECTION INTRAMUSCULAR
Status: DISCONTINUED | OUTPATIENT
Start: 2020-06-13 | End: 2020-06-17 | Stop reason: HOSPADM

## 2020-06-13 RX ORDER — BENZTROPINE MESYLATE 1 MG/1
1 TABLET ORAL
Status: DISCONTINUED | OUTPATIENT
Start: 2020-06-13 | End: 2020-06-17 | Stop reason: HOSPADM

## 2020-06-13 RX ORDER — DEXTROSE MONOHYDRATE 100 MG/ML
0-250 INJECTION, SOLUTION INTRAVENOUS AS NEEDED
Status: DISCONTINUED | OUTPATIENT
Start: 2020-06-13 | End: 2020-06-17 | Stop reason: HOSPADM

## 2020-06-13 RX ORDER — FUROSEMIDE 10 MG/ML
40 INJECTION INTRAMUSCULAR; INTRAVENOUS ONCE
Status: DISCONTINUED | OUTPATIENT
Start: 2020-06-13 | End: 2020-06-13

## 2020-06-13 RX ORDER — INSULIN LISPRO 100 [IU]/ML
INJECTION, SOLUTION INTRAVENOUS; SUBCUTANEOUS
Status: DISCONTINUED | OUTPATIENT
Start: 2020-06-14 | End: 2020-06-17 | Stop reason: HOSPADM

## 2020-06-13 RX ADMIN — FUROSEMIDE 40 MG: 40 TABLET ORAL at 08:39

## 2020-06-13 RX ADMIN — HEPARIN SODIUM 5000 UNITS: 5000 INJECTION INTRAVENOUS; SUBCUTANEOUS at 08:39

## 2020-06-13 RX ADMIN — FOLIC ACID 1 MG: 1 TABLET ORAL at 08:39

## 2020-06-13 RX ADMIN — POTASSIUM CHLORIDE 40 MEQ: 750 TABLET, FILM COATED, EXTENDED RELEASE ORAL at 08:39

## 2020-06-13 RX ADMIN — HEPARIN SODIUM 5000 UNITS: 5000 INJECTION INTRAVENOUS; SUBCUTANEOUS at 14:11

## 2020-06-13 RX ADMIN — THIAMINE HYDROCHLORIDE 300 MG: 100 INJECTION, SOLUTION INTRAMUSCULAR; INTRAVENOUS at 08:51

## 2020-06-13 RX ADMIN — ATORVASTATIN CALCIUM 10 MG: 10 TABLET, FILM COATED ORAL at 22:13

## 2020-06-13 RX ADMIN — SERTRALINE HYDROCHLORIDE 50 MG: 50 TABLET ORAL at 08:39

## 2020-06-13 RX ADMIN — PANTOPRAZOLE SODIUM 40 MG: 40 TABLET, DELAYED RELEASE ORAL at 08:39

## 2020-06-13 RX ADMIN — Medication 10 ML: at 16:06

## 2020-06-13 RX ADMIN — AMLODIPINE BESYLATE 5 MG: 5 TABLET ORAL at 08:39

## 2020-06-13 NOTE — PROGRESS NOTES
Case Management    ROSSY:  Transfer to Inpt. Psyh at [facility]     Call received from the transfer center at [facility]. Pt accepted for transfer to room 728 Bed-1 by Dr. Clair Hammer and NP Lennox Pascual. Nurse can call report at [phone]. Nurse to arrange transport with [facility] ([phone]). CM will continue to follow patient for discharge/transfer planning needs. Pepper Sierra, MSN, RN  Care Manager   ext.  [phone]

## 2020-06-13 NOTE — PROGRESS NOTES
0700 Report received from Keshawn Clinton [facility]. Sitter at bedside for suicide precautions. 0745 Assessment complete. AAO. Lungs clear. NSR on monitor. Pt reports no thoughts of harming himself or others. Will continue to monitor. Lindy Cedillo NP at bedside to assess patient. Orders obtained to draw blood via ultrasound if needed. 1100 Blood work sent to lab. Pt became defensive upon request for blood. Pt educated on procedure and RN obtained blood sample with 1 stick. Reassessment complete. No changes. No suicidal ideation. 1400 Reassessment complete. No changes. No suicidal ideation. [phone] Pt bathed with assistance from PCT. 1049 Report given to [facility], RN at [facility] [place] inpatient psych unit.

## 2020-06-13 NOTE — PROGRESS NOTES
Bedside shift change report given to Alexis Patel (oncoming nurse) by [facility] (offgoing nurse). Report included the following information SBAR, Kardex, Intake/Output, MAR, Recent Results and Cardiac Rhythm NSR.     1930: sitter present for suicide precautions. Tech aware of documentation requirements. 2225: Update given to Poison Control: orientation and alertness, lactic acid, chemistry, and ABG labs. 0030: IV infiltration to #18 to left hand. IV removed and +1 edema noted to hand. Elevated extremity on pillow. 0330: Patients serum osmolality and chemistry hemolyzed and patient refused re-draw.

## 2020-06-13 NOTE — BH NOTES
1620- TRANSFER - IN REPORT:    Verbal report received from Cathy Vergara 75 on Almeta Elida  being received from [facility] PCU(unit) for routine progression of care      Report consisted of patients Situation, Background, Assessment and   Recommendations(SBAR). Information from the following report(s) SBAR was reviewed with the receiving nurse. Opportunity for questions and clarification was provided. Assessment completed upon patients arrival to unit and care assumed. Per report pt is voluntarily coming to [facility] PSYCHIATRIC [place] following OD on PTA medications. Sukumar Gonzalez RN will call [facility] [phone] back with transportation update and and pt update prior to pt leaving [facility].

## 2020-06-13 NOTE — CONSULTS
Nephrology Consult Note     Kameron Verna     www. [email or web address]              Phone - [phone]   Patient: Justina Lynch   YOB: 1960    Date- 6/13/2020  MRN: [id number]             REASON FOR CONSULTATION: RILEY   CONSULTING PHYSICIAN: DR. NEGRETE    ADMIT DATE:6/11/2020 PATIENT PCP:Baljeet Vazquez MD     IMPRESSION:   .Acute Kidney injury d/t NSAIDS USE + LOW BP + diovan use   - improved. - UO good. Suicidal attempt  Medication overdose: Reported ibuprofen, amlodipine and metformin    H/o hypertension  DM 2  HEP C     PLAN:   Off bicarb gtt  Off diovan and norvasc  Off metformin  Avoid HYPOTENSION  AVOID NSAIDS  Supportive care     Active Problems:    Medication overdose (6/11/2020)        [x] High complexity decision making was performed  [x] Patient is at high-risk of decompensation with multiple organ involvement    Subjective:   HPI: Justina Lynch is a 63 y.o.  male. HE WAS TX FROM  [facility] with c/o suicidal attempt. He took ibuprofen, metformin, norvasc,   His bp was very low at OSH. He was give fluid bolus prior to tx  His cr. 2.2 and bicarb 20 on admisison  His cr 0.94 in nove 2020  He has been on diovan   His lactic acid level on admission 3.2  He has been taking ibuprofen on chronic basis  He denies taking any abx  He denies vomiting or diarrhea  No dysuria or hematuria. He has h/o hepc    Review of Systems:       A 11 point review of system was performed today. Pertinent positives and negatives are mentioned in the HPI. The reminder of the ROS is negative and noncontributory. Past Medical History:   Diagnosis Date    Chronic pain     Diabetes ([address]     Hepatitis C     Hypertension       No past surgical history on file. Prior to Admission medications    Medication Sig Start Date End Date Taking? Authorizing Provider   valsartan (DIOVAN) 160 mg tablet Take 1 Tab by mouth daily.  Indications: pressure 5/22/20  Yes Cheyenne Donnelly MD atorvastatin (LIPITOR) 10 mg tablet Take 1 Tab by mouth daily. Indications: prevent stroke 5/22/20  Yes Nayely Ferris MD   amLODIPine (NORVASC) 5 mg tablet Take 1 Tab by mouth daily. Indications: pressure 5/22/20  Yes Isabela Vazquez MD   lurasidone (LATUDA) 80 mg tab tablet 2 tabs with dinner daily  Indications: schizophrenia 5/22/20  Yes Nayely Ferris MD   metFORMIN (GLUCOPHAGE) 1,000 mg tablet Take 1 Tab by mouth two (2) times daily (with meals). Indications: type 2 diabetes mellitus 5/22/20  Yes Isabela Vazquez MD   gabapentin (NEURONTIN) 800 mg tablet Take 1 Tab by mouth three (3) times daily. Max Daily Amount: 2,400 mg. Indications: nerves 5/22/20  Yes Isabela Vazquez MD   diclofenac (VOLTAREN) 1 % gel AAA in thin coat BID as needed for painful joints 5/22/20  Yes Nayely Ferris MD   sertraline (ZOLOFT) 50 mg tablet Take 1 Tab by mouth daily. Indications: nerves 5/22/20  Yes Isabela Vazquez MD   glucose blood VI test strips (ASCENSIA AUTODISC VI, ONE TOUCH ULTRA TEST VI) strip Use to check sugar daily dx diabetes e11.9, not on insulin. 5/22/20  Yes Nayely Ferris MD   Blood-Glucose Meter monitoring kit Dx:e11.9, not on insulin; check sugar daily as directed. 5/22/20  Yes Isabela Vaqzuez MD   folic acid (FOLVITE) 1 mg tablet Take 1 Tab by mouth daily. 5/1/20  Yes Isabela Vazquez MD   thiamine HCL (B-1) 100 mg tablet Take 1 Tab by mouth daily. 3/2/20  Yes Isabela Vazquez MD   aspirin delayed-release 81 mg tablet Take 1 Tab by mouth daily.  Indications: prevent heart problem 3/2/20  Yes Isabela Vazquez MD     Allergies   Allergen Reactions    Tramadol Nausea and Vomiting    Lisinopril Cough     Developed cough while taking lisinopril      Social History     Tobacco Use    Smoking status: Current Every Day Smoker     Packs/day: 1.00     Types: Cigarettes     Last attempt to quit: 9/1/2016     Years since quitting: 3.7    Smokeless tobacco: Never Used   Substance Use Topics    Alcohol use: Not Currently     Alcohol/week: 0.0 standard drinks     Comment: Hx of abuse- denies use tonight-smells of ETOH      No family history on file. Objective:      Patient Vitals for the past 24 hrs:   Temp Pulse Resp BP SpO2   06/13/20 1106 98.4 °F (36.9 °C) 75 18 145/79 96 %   06/13/20 0658 98.1 °F (36.7 °C) 81 17 156/71 97 %   06/13/20 0327 98.3 °F (36.8 °C) 81 18 (!) 150/94 96 %   06/12/20 2239 98.2 °F (36.8 °C) 79 18 157/89 97 %   06/12/20 1940 98.5 °F (36.9 °C) 84 18 146/83 99 %   06/12/20 1722 98.2 °F (36.8 °C) 86 18 154/85 99 %     06/13 0701 - 06/13 1900  In: 2110 [P.O.:150;  I.V.:1959]  Out: -   Physical Exam:  General:Alert, No distress,   Eyes:No scleral icterus, No conjunctival pallor  Neck:Supple,no mass palpable,no thyromegaly  Lungs:Clears to auscultation Bilaterally, normal respiratory effort  CVS:RRR, S1 S2 normal,  No rub,  Abdomen:Soft, Non tender, No hepatosplenomegaly  Extremities: no LE edema  Skin:No rash or lesions, Warm and DRY   Psych: appropriate affect    :  No walden  Musculoskeletal : no redness, no joint tenderness  NEURO: Normal Speech, Non focal        CODE STATUS:  full  Care Plan discussed with:  patient     Chart reviewed.    y Reviewed previous records   y Discussion with patient and/or family and questions answered       ECG[de-identified] Rev:yes  Xray/CT/US/MRI REV:yes  Lab Data Personally Reviewed: (see below)  Recent Labs     06/13/20  1048 06/13/20  0043 06/12/20  1553 06/12/20  0317 06/11/20  1714 06/10/20  2355   WBC  --  4.9 5.3 11.1 17.8* 9.2   HGB  --  12.5 12.8 12.6 15.8 14.5   PLT  --  172 179 200 231 234   ANEU  --   --   --   --  16.2* 4.3     --  137 136 138 133*   K 4.1  --  3.5 4.2 4.3 3.2*   *  --  132* 104* 56* 109*   BUN 24*  --  34* 35* 29* 19   CREA 1.67*  --  2.52* 2.22* 2.24* 1.56*   ALT 30  --  28 30  --  36   TBILI 0.6  --  0.6 0.2  --  0.2   AP 48  --  45 49  --  56   CA 8.0*  --  7.5* 7.6* 9.3 8.8 Lab Results   Component Value Date/Time    Color YELLOW/STRAW 06/11/2020 01:20 AM    Appearance CLEAR 06/11/2020 01:20 AM    Specific gravity 1.025 06/11/2020 01:20 AM    Specific gravity 1.015 12/01/2014 01:17 PM    pH (UA) 6.0 06/11/2020 01:20 AM    Protein Negative 06/11/2020 01:20 AM    Glucose Negative 06/11/2020 01:20 AM    Ketone Negative 06/11/2020 01:20 AM    Bilirubin Negative 06/11/2020 01:20 AM    Urobilinogen 0.2 06/11/2020 01:20 AM    Nitrites Negative 06/11/2020 01:20 AM    Leukocyte Esterase Negative 06/11/2020 01:20 AM    Epithelial cells FEW 05/11/2015 05:00 PM    Bacteria NEGATIVE  05/11/2015 05:00 PM    WBC 0-4 05/11/2015 05:00 PM    RBC 0-5 05/11/2015 05:00 PM       No results found for: IRON, FE, TIBC, IBCT, PSAT, FERR  Lab Results   Component Value Date/Time    Culture result: NO GROWTH 2 DAYS 06/11/2020 05:55 PM     Prior to Admission Medications   Prescriptions Last Dose Informant Patient Reported? Taking? Blood-Glucose Meter monitoring kit   No No   Sig: Dx:e11.9, not on insulin; check sugar daily as directed. amLODIPine (NORVASC) 5 mg tablet   No No   Sig: Take 1 Tab by mouth daily. Indications: pressure   aspirin delayed-release 81 mg tablet   No No   Sig: Take 1 Tab by mouth daily. Indications: prevent heart problem   atorvastatin (LIPITOR) 10 mg tablet   No No   Sig: Take 1 Tab by mouth daily. Indications: prevent stroke   cyclobenzaprine (FLEXERIL) 10 mg tablet   No No   Sig: Take 1 Tab by mouth three (3) times daily as needed for Muscle Spasm(s). diclofenac (VOLTAREN) 1 % gel   No No   Sig: AAA in thin coat BID as needed for painful joints   folic acid (FOLVITE) 1 mg tablet   No No   Sig: Take 1 Tab by mouth daily. gabapentin (NEURONTIN) 800 mg tablet   No No   Sig: Take 1 Tab by mouth three (3) times daily. Max Daily Amount: 2,400 mg.  Indications: nerves   glucose blood VI test strips (ASCENSIA AUTODISC VI, ONE TOUCH ULTRA TEST VI) strip   No No   Sig: Use to check sugar daily dx diabetes e11.9, not on insulin. ibuprofen (MOTRIN) 800 mg tablet   No No   Sig: Take 1 Tab by mouth three (3) times daily as needed for Pain.   lidocaine (XYLOCAINE) 4 % topical cream   No No   Sig: Apply  to affected area three (3) times daily as needed for Pain.   lurasidone (LATUDA) 80 mg tab tablet   No No   Si tabs with dinner daily  Indications: schizophrenia   metFORMIN (GLUCOPHAGE) 1,000 mg tablet   No No   Sig: Take 1 Tab by mouth two (2) times daily (with meals). Indications: type 2 diabetes mellitus   methocarbamol (ROBAXIN) 750 mg tablet   No No   Sig: Take 1 Tab by mouth four (4) times daily. sertraline (ZOLOFT) 50 mg tablet   No No   Sig: Take 1 Tab by mouth daily. Indications: nerves   thiamine HCL (B-1) 100 mg tablet   No No   Sig: Take 1 Tab by mouth daily. valsartan (DIOVAN) 160 mg tablet   No No   Sig: Take 1 Tab by mouth daily. Indications: pressure      Facility-Administered Medications: None     Imaging:    Medications list Personally Reviewed   [x]      Yes     []               No    Thank you for allowing us to participate in the care this patient. We will follow patient with you. Signed By: Cornelia Lyons MD  [facility]  [facility] [address] [place], [address]  Phone - [phone]         Fax - [phone] [facility] Office  [address] [place] [address]  Phone - [phone]        Fax - [phone]     www. [facility]

## 2020-06-13 NOTE — DISCHARGE SUMMARY
Hospitalist Discharge Summary     Patient ID:  Michael Law  [id number]  63 y.o.  1960    PCP on record: Jordan Fajardo MD    Admit date: 6/11/2020  Discharge date and time: 6/13/2020      Admission Diagnoses: Medication overdose [T50.901A]    Discharge Diagnoses:    Suicidal attempt with medication overdose: Reported ibuprofen, amlodipine and metformin  Depression  Lactic acidosis  Hypothermia       CONSULTATIONS:  IP CONSULT TO PSYCHIATRY  IP CONSULT TO NEPHROLOGY    Excerpted HPI from H&P of Li Bennett MD:  Edison Ochoa is a 63 y.o.  male with past medical history of diabetes, depression, alcohol and tobacco abuse hepatitis C, hypertension was transferred from [facility] after a suicidal attempt . PT reported taking multiple medication  Per ED notes, there were 3 open medication bottles with 18 tabs of ibuprofen 800 mg , 8 tabs of metformin 500 mg, 1 or 2 tab of amlodipine missing. Patient reported that he wanted to kill himself, has been depressed lately  And this is  His second suicidal attempt. He also reported that he drinks alcohol occasionally, unable to give me exact amount, reported that his last alcohol use was week ago( last Saturday). In the ED, he was hypotensive upon presentation, his blood pressure improved with IV fluid boluses , poison control was called and advised to check lactic acid. Initial lactic acid was 3.2, trended up to 4.9 despite IV fluid. Patient also received calcium gluconate. His labs were unremarkable except for hypokalemia . UDS was positive for barbiturates. etoh level 2g . Acetaminophen and salicylate level were  within normal limit. His creatinine was elevated. When seen, patient was alert oriented x4 reported some nausea and admitted to the suicidal attempt.  We were asked to admit for work up and evaluation of the above problems.    _______________________________________________________________  Suyapa Morales COURSE:  for full details see H&P, daily progress notes, labs, consult notes. Pt is medically stable to discharge to [facility] for continuation of care for his depression and monitoring his improving Acute Kidney Injury at this time. Below are the details for medical illnesses that were treated during this hospitalization:    Suicidal attempt  Medication overdose: Reported ibuprofen, amlodipine and metformin  Depression- Continues  -Admitted to stepdown unit, suicide precaution, monitor blood pressure, negative for GI bleed or worsening epigastric pain  -Psychiatry Consulted. Inpatient therapy services recommended once patient is stable for transfer.   -Continue Zoloft. Will hold restarting Latuda until renal status improves. -Patient stable. Transfer to inpatient behavioral health unit at [facility].     Lactic acidosis-Resolved  Hypothermia- Resolved   -Noted temp 94.6 on admission. -Luciano hugger PRN  -Temp now 98.3 without Luciano hugger at discharge.  -Blood cultures x 2- NGTD  -Lactic acid resolved. 1.7 > 2.4 > 5.7 > 4.9 > 3.2. LA now 0.9 at discharge    Acute Kidney Injury- Resolved  -likely due to medication overdose.   -Baseline Cr 0.94 - 1.03  -Cr 2.52 > 2.22 > 2.24 on admission.  -Cr 1.67 now trending down.   -Continue to monitor for signs and symptoms of injury to include decreased urine output and urinary retention.  -Avoid nephrotoxic medications: NSAIDs, Valstartan  -Follow up with PCP for BMP and monitoring.     Alcohol abuse  Concern for alcohol withdrawal- Resolved  Tobacco abuse  -Completed banana bag  -CIWA protocol. CIWA now 0  -Nicoderm CQ 21 mg/24 TD patch daily  -Thiamine and folate supplements  -Continue therapy with rehab and support services.  Transfer to inpatient behavioral health unit.     Hypertension-Stable  -Resume antihypertensive Norvasc 5 mg PO daily.  -Continue to monitor BP to maintain SBP <140  -Don't take Valsartan until renal function is at baseline     Diabetes mellitus Type II-Stable  -Continue with sliding scale insulin  -Hbg A1C  6.7  -TSH 0.07, Low  -Follow up with PCP for evaluation of thyroid  -Continue SSI with coverage. Resume metformin once creatinine stabilizes.      Code Status: Full code  DVT Prophylaxis: Heparin SQ  GI Prophylaxis: PPI  Baseline: Ambulatory  Recommended Disposition: Inpatient Psychiatry    _______________________________________________________________________  Patient seen and examined by me on discharge day. Pertinent Findings:  Gen:    Not in distress  Chest: Clear lungs  CVS:   Regular rhythm. No edema  Abd:  Soft, not distended, not tender  Neuro:  Alert with depression. Oriented to person, place, and time   _______________________________________________________________________  DISCHARGE MEDICATIONS:   Current Discharge Medication List      START taking these medications    Details   insulin lispro (HUMALOG) 100 unit/mL injection INITIATE CORRECTIVE INSULIN PROTOCOL (MEDINA):  RX MEDINA Normal Sensitivity (Average weight)    AC (before meals), Q6H, and Q4H CORRECTIONAL SCALE only For Blood Sugar (mg/dl) of :             140-199=2 units            200-249=3 units  250-299=5 units  300-349=7 units  350 or greater = Call MD  Give in addition to basal medications. Do Not Hold for NPO    BEDTIME CORRECTIONAL sliding scale when scheduled:  200-249=2 units  250-299=3 units   300-349=4 units  350 or greater = Call MD  Give in addition to basal medications. Do Not Hold for NPO Fast Acting - Administer Immediately - or within 15 minutes of start of meal, if mealtime coverage. Qty: 1 Vial, Refills: 0      nicotine (NICODERM CQ) 14 mg/24 hr patch 1 Patch by TransDERmal route daily for 30 days. Qty: 30 Patch, Refills: 0         CONTINUE these medications which have NOT CHANGED    Details   atorvastatin (LIPITOR) 10 mg tablet Take 1 Tab by mouth daily.  Indications: prevent stroke  Qty: 90 Tab, Refills: 3 Associated Diagnoses: Mixed hyperlipidemia      amLODIPine (NORVASC) 5 mg tablet Take 1 Tab by mouth daily. Indications: pressure  Qty: 90 Tab, Refills: 3    Associated Diagnoses: Essential hypertension      diclofenac (VOLTAREN) 1 % gel AAA in thin coat BID as needed for painful joints  Qty: 100 g, Refills: 5    Associated Diagnoses: Arthritis      sertraline (ZOLOFT) 50 mg tablet Take 1 Tab by mouth daily. Indications: nerves  Qty: 90 Tab, Refills: 3    Associated Diagnoses: Schizoaffective disorder, depressive type ([facility])      glucose blood VI test strips (ASCENSIA AUTODISC VI, ONE TOUCH ULTRA TEST VI) strip Use to check sugar daily dx diabetes e11.9, not on insulin. Qty: 100 Strip, Refills: 3    Associated Diagnoses: Controlled type 2 diabetes mellitus with diabetic nephropathy, without long-term current use of insulin ([facility])      Blood-Glucose Meter monitoring kit Dx:e11.9, not on insulin; check sugar daily as directed. Qty: 1 Kit, Refills: 0    Associated Diagnoses: Controlled type 2 diabetes mellitus with diabetic nephropathy, without long-term current use of insulin ([facility])      folic acid (FOLVITE) 1 mg tablet Take 1 Tab by mouth daily. Qty: 90 Tab, Refills: 0    Associated Diagnoses: Schizoaffective disorder, depressive type ([facility])      thiamine HCL (B-1) 100 mg tablet Take 1 Tab by mouth daily. Qty: 30 Tab, Refills: 5    Comments: [facility] pt  Associated Diagnoses: Schizoaffective disorder, depressive type ([facility])      aspirin delayed-release 81 mg tablet Take 1 Tab by mouth daily.  Indications: prevent heart problem  Qty: 100 Tab, Refills: 1    Comments: [facility] pt  Associated Diagnoses: Essential hypertension         STOP taking these medications       valsartan (DIOVAN) 160 mg tablet Comments:   Reason for Stopping:         lurasidone (LATUDA) 80 mg tab tablet Comments:   Reason for Stopping:         metFORMIN (GLUCOPHAGE) 1,000 mg tablet Comments:   Reason for Stopping:         gabapentin (NEURONTIN) 800 mg tablet Comments:   Reason for Stopping:               My Recommended Diet, Activity, Wound Care, and follow-up labs are listed in the patient's Discharge Insturctions which I have personally completed and reviewed.     _______________________________________________________________________  DISPOSITION:     Home with Family:    Home with HH/PT/OT/RN:    SNF/LTC:    BRIAN:    OTHER: X- [facility]       Condition at Discharge:  Stable  _______________________________________________________________________  Follow up with:   PCP : Ravindra Monroe MD  Follow-up Information     Follow up With Specialties Details Why Contact Info    Ravindra Monroe, [address]  [phone]                Total time in minutes spent coordinating this discharge (includes going over instructions, follow-up, prescriptions, and preparing report for sign off to her PCP) :  30 minutes    Signed:  Aminata Zuleta NP

## 2020-06-13 NOTE — PROGRESS NOTES
Problem: Falls - Risk of  Goal: *Absence of Falls  Description: Document Clydene Bone Fall Risk and appropriate interventions in the flowsheet.   Outcome: Progressing Towards Goal  Note: Fall Risk Interventions:  Mobility Interventions: Assess mobility with egress test         Medication Interventions: Patient to call before getting OOB                   Problem: Patient Education: Go to Patient Education Activity  Goal: Patient/Family Education  Outcome: Progressing Towards Goal     Problem: Suicide  Goal: *STG: Remains safe in hospital  Outcome: Progressing Towards Goal  Goal: *STG: Seeks staff when feelings of self harm or harm towards others arise  Outcome: Progressing Towards Goal  Goal: *STG: Attends activities and groups  Outcome: Progressing Towards Goal  Goal: *STG:  Verbalizes alternative ways of dealing with maladaptive feelings/behaviors  Outcome: Progressing Towards Goal  Goal: *STG/LTG: Complies with medication therapy  Outcome: Progressing Towards Goal  Goal: *STG/LTG: No longer expresses self destructive or suicidal thoughts  Outcome: Progressing Towards Goal  Goal: *LTG:  Identifies available community resources  Outcome: Progressing Towards Goal  Goal: *LTG:  Develops proactive suicide prevention plan  Outcome: Progressing Towards Goal  Goal: Interventions  Outcome: Progressing Towards Goal     Problem: Alcohol Withdrawal  Goal: *STG: Participates in treatment plan  Outcome: Progressing Towards Goal  Goal: *STG: Remains safe in hospital  Outcome: Progressing Towards Goal  Goal: *STG: Seeks staff when symptoms of withdrawal increase  Outcome: Progressing Towards Goal  Goal: *STG: Complies with medication therapy  Outcome: Progressing Towards Goal  Goal: *STG: Attends activities and groups  Outcome: Progressing Towards Goal  Goal: *STG: Will identify negative impact of chemical dependency including the use of tobacco, alcohol, and other substances  Outcome: Progressing Towards Goal  Goal: *STG: Verbalizes abstinence as an achievable goal  Outcome: Progressing Towards Goal  Goal: *STG: Agrees to participate in outpatient after care program to support ongoing mental health  Outcome: Progressing Towards Goal  Goal: *STG: Able to indentify relapse triggers including interpersonal/social and familial factors  Outcome: Progressing Towards Goal  Goal: *STG: Identify lifestyle changes to support long term sobriety such as vocation, employment, education, and legal issues  Outcome: Progressing Towards Goal  Goal: *STG: Maintains appropriate nutrition and hydration  Outcome: Progressing Towards Goal  Goal: *STG: Vital signs within defined limits  Outcome: Progressing Towards Goal  Goal: *STG/LTG: Relapse prevention plan in place to include housing/aftercare, leisure activities, and spirituality  Outcome: Progressing Towards Goal  Goal: Interventions  Outcome: Progressing Towards Goal

## 2020-06-14 LAB
GLUCOSE BLD STRIP.AUTO-MCNC: 103 MG/DL (ref 65–100)
GLUCOSE BLD STRIP.AUTO-MCNC: 112 MG/DL (ref 65–100)
GLUCOSE BLD STRIP.AUTO-MCNC: 119 MG/DL (ref 65–100)
GLUCOSE BLD STRIP.AUTO-MCNC: 178 MG/DL (ref 65–100)
SERVICE CMNT-IMP: ABNORMAL

## 2020-06-14 PROCEDURE — 74011250637 HC RX REV CODE- 250/637: Performed by: NURSE PRACTITIONER

## 2020-06-14 PROCEDURE — 65220000003 HC RM SEMIPRIVATE PSYCH

## 2020-06-14 PROCEDURE — 82962 GLUCOSE BLOOD TEST: CPT

## 2020-06-14 RX ADMIN — FOLIC ACID 1 MG: 1 TABLET ORAL at 08:48

## 2020-06-14 RX ADMIN — AMLODIPINE BESYLATE 5 MG: 5 TABLET ORAL at 08:49

## 2020-06-14 RX ADMIN — Medication 100 MG: at 08:48

## 2020-06-14 RX ADMIN — FUROSEMIDE 40 MG: 40 TABLET ORAL at 08:49

## 2020-06-14 RX ADMIN — LURASIDONE HYDROCHLORIDE 40 MG: 40 TABLET, FILM COATED ORAL at 18:17

## 2020-06-14 RX ADMIN — SERTRALINE HYDROCHLORIDE 50 MG: 50 TABLET ORAL at 08:48

## 2020-06-14 RX ADMIN — ATORVASTATIN CALCIUM 10 MG: 10 TABLET, FILM COATED ORAL at 21:10

## 2020-06-14 RX ADMIN — PANTOPRAZOLE SODIUM 40 MG: 40 TABLET, DELAYED RELEASE ORAL at 06:32

## 2020-06-14 NOTE — GROUP NOTE
[facility] GROUP DOCUMENTATION INDIVIDUAL Group Therapy Note Date: 6/14/2020 Group Start Time: 0790 Group End Time: 8161 Group Topic: [employer] [address] Brittaney Ron [facility] GROUP DOCUMENTATION GROUP Group Therapy Note Attendees: 4/7 Attendance: Attended Patient's Goal: Discuss goals, discuss treatment team 
 
Interventions/techniques: Challenged, Informed and Supported Follows Directions: Followed directions Interactions: Interacted appropriately Mental Status: Calm Behavior/appearance: Attentive and Cooperative Goals Achieved: Able to engage in interactions, Able to listen to others and Able to reflect/comment on own behavior Additional Notes: Staff met with group to discuss unit schedule and roles of staff and discussed treatment team. Staff then discussed importance of setting goals in order to achieve a larger, overall goal. Staff and group discussed setting small, realistic, and concrete goals that can be accomplished within a 24hr period that help lead to a larger overall goal. Staff asked group to share goals for today or for when they are discharged. Lastly, staff reviewed the sheet \"ideas for revitalizing and energizing yourself\" and asked group to share some points that stuck out to them from the sheet for discussion. Pt shared he has been struggling to get out of bed, so that is one goal of his. He also has a goal to shave and call his brother. Pt was actively engaged in group and completed worksheets provided by staff. Anu Brooks

## 2020-06-14 NOTE — PROGRESS NOTES
Problem: Depressed Mood (Adult/Pediatric)  Goal: *STG: Participates in treatment plan  6/14/2020 1543 by Naldo Gutiérrez  Outcome: Progressing Towards Goal     Problem: Depressed Mood (Adult/Pediatric)  Goal: *STG: Attends activities and groups  Outcome: Progressing Towards Goal     Problem: Depressed Mood (Adult/Pediatric)  Goal: *STG: Remains safe in hospital  6/14/2020 1543 by Naldo Gutiérrez  Outcome: Progressing Towards Goal     Problem: Depressed Mood (Adult/Pediatric)  Goal: Interventions  6/14/2020 1543 by Naldo Gutiérrez  Outcome: Progressing Towards Goal  Note: Medication meal compliant. Depressed mood. Attending groups. Verbalizing needs appropriately. Denies SI. No self harming behaviors observed.

## 2020-06-14 NOTE — PROGRESS NOTES
Bedside shift change report given to Guilherme (oncoming nurse) by Shashi Magallanes (offgoing nurse). Report included the following information SBAR and Cardiac Rhythm NSR.

## 2020-06-14 NOTE — CONSULTS
Hospitalist H&P   Faustina Kidney, FNP-MARY, ELIZABETH  Cell [phone] (7pm-7am)     Date of Service: 6/13/2020  Primary Care Provider: Ninoska Alex MD  Source of Information: Patient, chart review    History of Presenting Illness:   Barbara Saucedo is a 63 y.o. male with past medical history of diabetes, depression, alcohol abuse, tobacco use, hepatitis C, hypertension transferred to [facility] inpatient behavioral health unit from [facility] for inpatient management of depression and suicidal ideations status post intentional drug overdose. Summary of inpatient admission is as follows:  · Initially admitted to [facility] after an overdose of apparent an estimated 18 tabs of ibuprofen 800 mg, 8 tabs of metformin 500 mg, and 1 or 2 tabs of amlodipine. On initial presentation to the emergency department, he was found to be hypotensive which improved with IV fluids. Given calcium gluconate. Lactic acid was elevated at 4.9. UDS positive for barbiturates, EtOH level 200. Creatinine was initially mildly elevated at 1.56.  · Admitted stepdown floor and placed on bicarb drip. · Home valsartan was held, Norvasc resumed. · Home metformin held, placed on SSI  · Creatinine trended down from 2.52 to 1.67 today, unknown baseline as the last labs in the system are from 2017, but at that time his baseline was 1.0  · Placed on CIWA precaution, nutritional supplements, no signs of alcohol withdrawal  · Cleared for transfer to inpatient behavioral health unit by hospitalist team    The hospitalist group is consulted for medical management. At this time he has no acute medical concerns. He verbalizes understanding of his kidney injury and the possible sequelae. Reports that he is voiding well, denies urinary retention. Understands that he will need to hold valsartan, metformin, and NSAIDs until his renal function has normalized.     Tobacco use: Half pack per day  Illicit drug use: Denies  Alcohol use: Denies at this time (blood alcohol level upon admission was 200)     Assessment & Plan     Status post intentional drug overdose  -Reported ibuprofen, amlodipine, metformin with subsequent acute kidney injury  -Initially hypotensive, given IV fluids, calcium gluconate, bicarbonate drip -now normotensive  -Initial lactic acidosis has resolved, 0.9 at discharge, blood cultures x2 NGTD  -Acute kidney injury resolving    Acute kidney injury  -Secondary to above  -Creatinine peaked at 2.52, trended down creatinine 1.67 at discharge  -Continue to monitor for signs and symptoms of injury to include decreased urine output and urinary retention  -Avoid nephrotoxins and renally dose medications. Hold metformin and valsartan have been held  -Offered patient daily renal function monitoring via lab draws and assistance transitioning him back to his home metformin valsartan once his renal function has normalized. He declined this, stating that he would rather follow-up with his primary care doctor. Explained to him that he will need to continue to hold valsartan and metformin until cleared by his physician, continuing Norvasc and sliding scale insulin. Monitor for signs and symptoms of worsening injury including decreased urine output and urinary retention.   He states he has access to his primary care doctor and assures me he will follow-up within a week  -Case management consult to help arrange follow-up appointment    Hypertension  -Stable 135/88  -Continue home Norvasc 5 mg daily  -Continue to hold valsartan until renal function is baseline    Diabetes mellitus type 2  -Stable hemoglobin A1c 6.7  -SSI,ACHS Accu-Cheks, hypoglycemia protocol  -Continue to hold metformin until renal function is baseline  -Diabetes treatment center consultation for assistance with insulin teaching    Abnormal TSH  -TSH 0.07 at transferring facility, no known history of thyroid issues  -Likely related to critical illness, follow-up with PCP for repeat    Alcohol abuse  -CIWA protocol as per primary team  -No signs or symptoms of alcohol withdrawal at this time  -Nutritional support including folic acid, thiamine, multivitamin    Tobacco abuse  -Advised smoking cessation  -Nicotine patch    Depression/suicidal ideation  -As per primary team    DVT Prophylaxis: Up ad morro, none indicated  Code status: Full  Disposition: Per primary team    Thank you for giving us opportunity to participate in this patients care. Will sign off at this time, please re-consult if there are any further medical management needs or questions. Review of Systems:  Review of Systems   Constitutional: Negative for chills, fever and malaise/fatigue. HENT: Negative for congestion and sore throat. Respiratory: Negative for cough, shortness of breath and wheezing. Cardiovascular: Negative for chest pain, palpitations and leg swelling. Gastrointestinal: Negative for abdominal pain, heartburn, nausea and vomiting. Genitourinary: Negative for dysuria, frequency and urgency. Neurological: Negative for dizziness, weakness and headaches. Psychiatric/Behavioral: Positive for depression and suicidal ideas. Past Medical History:   Diagnosis Date    Chronic pain     Diabetes ([facility] [address]     Hepatitis C     Hypertension       No past surgical history on file. Prior to Admission medications    Medication Sig Start Date End Date Taking? Authorizing Provider   insulin lispro (HUMALOG) 100 unit/mL injection INITIATE CORRECTIVE INSULIN PROTOCOL (MEDINA):  RX MEDINA Normal Sensitivity (Average weight)    AC (before meals), Q6H, and Q4H CORRECTIONAL SCALE only For Blood Sugar (mg/dl) of :             140-199=2 units            200-249=3 units  250-299=5 units  300-349=7 units  350 or greater = Call MD  Give in addition to basal medications.   Do Not Hold for NPO    BEDTIME CORRECTIONAL sliding scale when scheduled:  200-249=2 units  250-299=3 units 300-349=4 units  350 or greater = Call MD  Give in addition to basal medications. Do Not Hold for NPO Fast Acting - Administer Immediately - or within 15 minutes of start of meal, if mealtime coverage. 6/13/20   Adron Lesch, NP   nicotine (NICODERM CQ) 14 mg/24 hr patch 1 Patch by TransDERmal route daily for 30 days. 6/14/20 7/14/20  Adron Lesch, NP   atorvastatin (LIPITOR) 10 mg tablet Take 1 Tab by mouth daily. Indications: prevent stroke 5/22/20   Tammi Glass MD   amLODIPine (NORVASC) 5 mg tablet Take 1 Tab by mouth daily. Indications: pressure 5/22/20   Romulo Vazquez MD   diclofenac (VOLTAREN) 1 % gel AAA in thin coat BID as needed for painful joints 5/22/20   Romulo Vazquez MD   sertraline (ZOLOFT) 50 mg tablet Take 1 Tab by mouth daily. Indications: nerves 5/22/20   Romulo Vazquez MD   glucose blood VI test strips (ASCENSIA AUTODISC VI, ONE TOUCH ULTRA TEST VI) strip Use to check sugar daily dx diabetes e11.9, not on insulin. 5/22/20   Tammi Glass MD   Blood-Glucose Meter monitoring kit Dx:e11.9, not on insulin; check sugar daily as directed. 5/22/20   Tammi Glass MD   folic acid (FOLVITE) 1 mg tablet Take 1 Tab by mouth daily. 5/1/20   Tammi Glass MD   thiamine HCL (B-1) 100 mg tablet Take 1 Tab by mouth daily. 3/2/20   Tammi Glass MD   aspirin delayed-release 81 mg tablet Take 1 Tab by mouth daily.  Indications: prevent heart problem 3/2/20   Tammi Glass MD     Allergies   Allergen Reactions    Tramadol Nausea and Vomiting    Lisinopril Cough     Developed cough while taking lisinopril      Denies known family history of diabetes, CVA, MI.    SOCIAL HISTORY:  Patient resides:  Independently [x]   Assisted Living []   SNF []   With family care []      Smoking history:   None []   Former []   Chronic [x]     Alcohol history:   None []   Social []   Chronic [x]     Ambulates:   Independently [x]   W/cane []   W/walker []   W/wc []     CODE STATUS:  DNR []   Full [x]   Other []     Objective:   Physical Exam:   Visit Vitals  /88   Pulse 86   Temp 98.3 °F (36.8 °C)   Resp 16   Ht 5' 9\" (1.753 m)   Wt 68 kg (150 lb)   SpO2 97%   BMI 22.15 kg/m²           Physical Exam  Constitutional:       General: He is not in acute distress. Appearance: He is well-developed. He is not diaphoretic. HENT:      Head: Normocephalic and atraumatic. Eyes:      General:         Right eye: No discharge. Left eye: No discharge. Conjunctiva/sclera: Conjunctivae normal.   Neck:      Musculoskeletal: Normal range of motion. Cardiovascular:      Rate and Rhythm: Normal rate and regular rhythm. Heart sounds: Normal heart sounds. No murmur. No friction rub. No gallop. Pulmonary:      Effort: Pulmonary effort is normal. No respiratory distress. Breath sounds: Normal breath sounds. No wheezing or rales. Abdominal:      General: Bowel sounds are normal. There is no distension. Palpations: Abdomen is soft. Tenderness: There is no abdominal tenderness. Musculoskeletal: Normal range of motion. General: No tenderness or deformity. Skin:     General: Skin is warm and dry. Capillary Refill: Capillary refill takes less than 2 seconds. Coloration: Skin is not pale. Findings: No erythema or rash. Neurological:      Mental Status: He is alert and oriented to person, place, and time. Psychiatric:         Behavior: Behavior normal.         Thought Content:  Thought content normal.         Judgment: Judgment normal.         Data Review:   Lab results (past 7 days)  Admission on 06/11/2020, Discharged on 06/13/2020   Component Date Value    Lactic acid 06/11/2020 5.7*    WBC 06/11/2020 17.8*    RBC 06/11/2020 5.23     HGB 06/11/2020 15.8     HCT 06/11/2020 48.7     MCV 06/11/2020 93.1     MCH 06/11/2020 30.2     MCHC 06/11/2020 32.4     RDW 06/11/2020 13.9     PLATELET 27/12/7537 800  MPV 06/11/2020 10.4     NRBC 06/11/2020 0.0     ABSOLUTE NRBC 06/11/2020 0.00     NEUTROPHILS 06/11/2020 91*    LYMPHOCYTES 06/11/2020 8*    MONOCYTES 06/11/2020 0*    EOSINOPHILS 06/11/2020 0     BASOPHILS 06/11/2020 1     IMMATURE GRANULOCYTES 06/11/2020 0     ABS. NEUTROPHILS 06/11/2020 16.2*    ABS. LYMPHOCYTES 06/11/2020 1.4     ABS. MONOCYTES 06/11/2020 0.0     ABS. EOSINOPHILS 06/11/2020 0.0     ABS. BASOPHILS 06/11/2020 0.2*    ABS. IMM.  GRANS. 06/11/2020 0.0     DF 06/11/2020 MANUAL     RBC COMMENTS 06/11/2020 NORMOCYTIC, NORMOCHROMIC     Sodium 06/11/2020 138     Potassium 06/11/2020 4.3     Chloride 06/11/2020 104     CO2 06/11/2020 20*    Anion gap 06/11/2020 14     Glucose 06/11/2020 56*    BUN 06/11/2020 29*    Creatinine 06/11/2020 2.24*    BUN/Creatinine ratio 06/11/2020 13     GFR est AA 06/11/2020 37*    GFR est non-AA 06/11/2020 30*    Calcium 06/11/2020 9.3     Special Requests: 06/11/2020 NO SPECIAL REQUESTS     Culture result: 06/11/2020 NO GROWTH 2 DAYS     Glucose (POC) 06/11/2020 97     Performed by 06/11/2020 Stuart Green (CON)     Glucose (POC) 06/11/2020 96     Performed by 06/11/2020 Smith Enriquez (PCT)     Calcium, ionized (POC) 06/11/2020 1.08*    pH (POC) 06/11/2020 7.345*    pCO2 (POC) 06/11/2020 30.7*    pO2 (POC) 06/11/2020 92     HCO3 (POC) 06/11/2020 16.8*    Base deficit (POC) 06/11/2020 9     sO2 (POC) 06/11/2020 97     Site 06/11/2020 OTHER     Device: 06/11/2020 ROOM AIR     Allens test (POC) 06/11/2020 N/A     Specimen type (POC) 06/11/2020 VENOUS BLOOD     WBC 06/12/2020 11.1     RBC 06/12/2020 4.14     HGB 06/12/2020 12.6     HCT 06/12/2020 37.6     MCV 06/12/2020 90.8     MCH 06/12/2020 30.4     MCHC 06/12/2020 33.5     RDW 06/12/2020 13.7     PLATELET 91/48/2626 492     MPV 06/12/2020 9.9     NRBC 06/12/2020 0.0     ABSOLUTE NRBC 06/12/2020 0.00     Sodium 06/12/2020 136     Potassium 06/12/2020 4.2     Chloride 06/12/2020 106     CO2 06/12/2020 19*    Anion gap 06/12/2020 11     Glucose 06/12/2020 104*    BUN 06/12/2020 35*    Creatinine 06/12/2020 2.22*    BUN/Creatinine ratio 06/12/2020 16     GFR est AA 06/12/2020 37*    GFR est non-AA 06/12/2020 31*    Calcium 06/12/2020 7.6*    Bilirubin, total 06/12/2020 0.2     ALT (SGPT) 06/12/2020 30     AST (SGOT) 06/12/2020 18     Alk. phosphatase 06/12/2020 49     Protein, total 06/12/2020 6.3*    Albumin 06/12/2020 3.2*    Globulin 06/12/2020 3.1     A-G Ratio 06/12/2020 1.0*    Lactic acid 06/12/2020 2.4*    Bilirubin, direct 06/12/2020 0.1     Glucose (POC) 06/12/2020 128*    Performed by 06/12/2020 Robbi Bañuelos (PCT)     Glucose (POC) 06/11/2020 81     Performed by 06/11/2020 Irma Weems PCT     Glucose (POC) 06/12/2020 187*    Performed by 06/12/2020 Robbi Bañuelos (PCT)     WBC 06/12/2020 5.3     RBC 06/12/2020 4.16     HGB 06/12/2020 12.8     HCT 06/12/2020 36.5*    MCV 06/12/2020 87.7     MCH 06/12/2020 30.8     MCHC 06/12/2020 35.1     RDW 06/12/2020 13.6     PLATELET 45/53/9830 273     MPV 06/12/2020 9.5     NRBC 06/12/2020 0.0     ABSOLUTE NRBC 06/12/2020 0.00     Sodium 06/12/2020 137     Potassium 06/12/2020 3.5     Chloride 06/12/2020 104     CO2 06/12/2020 28     Anion gap 06/12/2020 5     Glucose 06/12/2020 132*    BUN 06/12/2020 34*    Creatinine 06/12/2020 2.52*    BUN/Creatinine ratio 06/12/2020 13     GFR est AA 06/12/2020 32*    GFR est non-AA 06/12/2020 26*    Calcium 06/12/2020 7.5*    Bilirubin, total 06/12/2020 0.6     ALT (SGPT) 06/12/2020 28     AST (SGOT) 06/12/2020 16     Alk.  phosphatase 06/12/2020 45     Protein, total 06/12/2020 6.3*    Albumin 06/12/2020 3.1*    Globulin 06/12/2020 3.2     A-G Ratio 06/12/2020 1.0*    Lactic acid 06/12/2020 1.7     Calcium, ionized (POC) 06/12/2020 1.09*    pH (POC) 06/12/2020 7.416     pCO2 (POC) 06/12/2020 41.1     pO2 (POC) 06/12/2020 80  HCO3 (POC) 06/12/2020 26.5*    Base excess (POC) 06/12/2020 2     sO2 (POC) 06/12/2020 96     Site 06/12/2020 RIGHT RADIAL     Device: 06/12/2020 ROOM AIR     Allens test (POC) 06/12/2020 YES     Specimen type (POC) 06/12/2020 ARTERIAL     Glucose (POC) 06/12/2020 134*    Performed by 06/12/2020 Marisa Escamillau (PCT)     TSH 06/12/2020 0.07*    Hemoglobin A1c 06/12/2020 6.7*    Est. average glucose 06/12/2020 146     Sodium,urine random 06/12/2020 75     Eosinophils,urine 06/12/2020 Negative     Protein, urine random 06/12/2020 10     Creatinine, urine 06/12/2020 49.10     Protein/Creat. urine Rat* 06/12/2020 0.2     SAMPLES BEING HELD 06/12/2020  1 UA     COMMENT 06/12/2020 Add-on orders for these samples will be processed based on acceptable specimen integrity and analyte stability, which may vary by analyte.  Glucose (POC) 06/12/2020 112*    Performed by 06/12/2020 Cristian Simpson     Lactic acid 06/13/2020 0.9     WBC 06/13/2020 4.9     RBC 06/13/2020 4.12     HGB 06/13/2020 12.5     HCT 06/13/2020 36.4*    MCV 06/13/2020 88.3     MCH 06/13/2020 30.3     MCHC 06/13/2020 34.3     RDW 06/13/2020 13.5     PLATELET 72/51/7583 368     MPV 06/13/2020 9.8     NRBC 06/13/2020 0.0     ABSOLUTE NRBC 06/13/2020 0.00     Osmolality, serum/plasma 06/13/2020 302     Sodium 06/13/2020 139     Potassium 06/13/2020 4.1     Chloride 06/13/2020 106     CO2 06/13/2020 30     Anion gap 06/13/2020 3*    Glucose 06/13/2020 118*    BUN 06/13/2020 24*    Creatinine 06/13/2020 1.67*    BUN/Creatinine ratio 06/13/2020 14     GFR est AA 06/13/2020 51*    GFR est non-AA 06/13/2020 42*    Calcium 06/13/2020 8.0*    Bilirubin, total 06/13/2020 0.6     ALT (SGPT) 06/13/2020 30     AST (SGOT) 06/13/2020 17     Alk.  phosphatase 06/13/2020 48     Protein, total 06/13/2020 7.2     Albumin 06/13/2020 3.5     Globulin 06/13/2020 3.7     A-G Ratio 06/13/2020 0.9*    Glucose (POC) 06/13/2020 109*    Performed by 06/13/2020 Brian Fernandez RN     Glucose (POC) 06/13/2020 123*    Performed by 06/13/2020 Brian Fernandez RN     Glucose (POC) 06/13/2020 110*    Performed by 06/13/2020 Leann Barrios (PCT)    Admission on 06/10/2020, Discharged on 06/11/2020   Component Date Value    AMPHETAMINES 06/11/2020 Negative     BARBITURATES 06/11/2020 Positive*    BENZODIAZEPINES 06/11/2020 Negative     COCAINE 06/11/2020 Negative     OPIATES 06/11/2020 Negative     PCP(PHENCYCLIDINE) 06/11/2020 Negative     THC (TH-CANNABINOL) 06/11/2020 Negative     TRICYCLICS 70/42/2525 Negative     DRUG SCREEN COMMENT 06/11/2020 (NOTE)     WBC 06/10/2020 9.2     RBC 06/10/2020 4.74     HGB 06/10/2020 14.5     HCT 06/10/2020 41.7     MCV 06/10/2020 88.0     MCH 06/10/2020 30.6     MCHC 06/10/2020 34.8     RDW 06/10/2020 13.2     PLATELET 60/78/6687 794     MPV 06/10/2020 9.5     NRBC 06/10/2020 0.0     ABSOLUTE NRBC 06/10/2020 0.00     NEUTROPHILS 06/10/2020 46     LYMPHOCYTES 06/10/2020 43     MONOCYTES 06/10/2020 7     EOSINOPHILS 06/10/2020 2     BASOPHILS 06/10/2020 1     IMMATURE GRANULOCYTES 06/10/2020 1*    ABS. NEUTROPHILS 06/10/2020 4.3     ABS. LYMPHOCYTES 06/10/2020 4.0*    ABS. MONOCYTES 06/10/2020 0.6     ABS. EOSINOPHILS 06/10/2020 0.2     ABS. BASOPHILS 06/10/2020 0.1     ABS. IMM.  GRANS. 06/10/2020 0.1*    DF 06/10/2020 AUTOMATED     Color 06/11/2020 YELLOW/STRAW     Appearance 06/11/2020 CLEAR     Specific gravity 06/11/2020 1.025     pH (UA) 06/11/2020 6.0     Protein 06/11/2020 Negative     Glucose 06/11/2020 Negative     Ketone 06/11/2020 Negative     Bilirubin 06/11/2020 Negative     Blood 06/11/2020 Negative     Urobilinogen 06/11/2020 0.2     Nitrites 06/11/2020 Negative     Leukocyte Esterase 06/11/2020 Negative     Sodium 06/10/2020 133*    Potassium 06/10/2020 3.2*    Chloride 06/10/2020 96*    CO2 06/10/2020 26     Anion gap 06/10/2020 11     Glucose 06/10/2020 109*    BUN 06/10/2020 19     Creatinine 06/10/2020 1.56*    BUN/Creatinine ratio 06/10/2020 12     GFR est AA 06/10/2020 56*    GFR est non-AA 06/10/2020 46*    Calcium 06/10/2020 8.8     Bilirubin, total 06/10/2020 0.2     ALT (SGPT) 06/10/2020 36     AST (SGOT) 06/10/2020 24     Alk. phosphatase 06/10/2020 56     Protein, total 06/10/2020 7.3     Albumin 06/10/2020 4.1     Globulin 06/10/2020 3.2     A-G Ratio 06/10/2020 1.3     ALCOHOL(ETHYL),SERUM 53/79/2250 133*    Salicylate level 39/07/8704 4.8     Acetaminophen level 06/10/2020 <2*    Ventricular Rate 06/11/2020 90     Atrial Rate 06/11/2020 90     P-R Interval 06/11/2020 162     QRS Duration 06/11/2020 104     Q-T Interval 06/11/2020 364     QTC Calculation (Bezet) 06/11/2020 445     Calculated P Axis 06/11/2020 47     Calculated R Axis 06/11/2020 -75     Calculated T Axis 06/11/2020 62     Diagnosis 06/11/2020                      Value:Normal sinus rhythm  Incomplete right bundle branch block  Left anterior fascicular block  Abnormal ECG  When compared with ECG of 13-MAY-2015 18:05,  fusion complexes are no longer present  premature ventricular complexes are no longer present  premature atrial complexes are no longer present  Incomplete right bundle branch block is now present      Lactic acid 06/11/2020 3.2*    Calcium, ionized (POC) 06/11/2020 1.09*    FIO2 (POC) 06/11/2020 21     pH (POC) 06/11/2020 7. 358     pCO2 (POC) 06/11/2020 42.2     pO2 (POC) 06/11/2020 76*    HCO3 (POC) 06/11/2020 23.7     Base deficit (POC) 06/11/2020 2     sO2 (POC) 06/11/2020 94     Site 06/11/2020 OTHER     Device: 06/11/2020 ROOM AIR     Allens test (POC) 06/11/2020 N/A     Specimen type (POC) 06/11/2020 VENOUS BLOOD     Total resp. rate 06/11/2020 14     Lactic acid 06/11/2020 4.9*    ALCOHOL(ETHYL),SERUM 06/11/2020 144*        Imaging results (past 24 hours):  No results found. EKG (most recent):    No results found for this or any previous visit.     Signed By: Rachel Nino NP     June 13, 2020

## 2020-06-14 NOTE — PROGRESS NOTES
Problem: Depressed Mood (Adult/Pediatric)  Goal: *STG: Participates in treatment plan  Outcome: Progressing Towards Goal     Problem: Depressed Mood (Adult/Pediatric)  Goal: *STG: Remains safe in hospital  Outcome: Progressing Towards Goal     Problem: Depressed Mood (Adult/Pediatric)  Goal: *STG: Complies with medication therapy  Outcome: Progressing Towards Goal     Problem: Depressed Mood (Adult/Pediatric)  Goal: Interventions  Outcome: Progressing Towards Goal  Note: Pt is alert and oriented. Calm irritable. Verbalizing his needs to staff. Pt states \"I am a hard stick\" for lab work. Medication meal compliant. Initial treatment team planned today. Depressed mood. Pt states he was off of Latuda and Zoloft for one month prior to SA (OD) due to change in medication source. Medications now from [facility] source and \"Covid\" causing a delay in medication availability for pt. Lives with brother in [place] [id number]. No working; on disability.         [address] [facility] [address]  Master Treatment Plan for Patrice Marrero    Date Treatment Plan Initiated: 6/14/20    Treatment Plan Modalities:  Type of Modality Amount  (x minutes) Frequency (x/week) Duration (x days) Name of Responsible Staff   710 [facility] meetings to encourage peer interactions 15 7 1   Hakan LEON   Group psychotherapy to assist in building coping skills and internal controls 60 7 1 Robert Muhammad   Therapeutic activity groups to build coping skills 60 7 1 Robert Muhammad   Psychoeducation in group setting to address:   Medication education   [phone] [facility] skills         Relaxation techniques         Symptom management         Discharge planning   60 2 255 [facility]    60 2 1 [address] [facility]   60 1 1 volunteer   Recovery/AA/NA      volunteer   Physician medication management   15 7 1 Dr Peterson/Vicente NP   Family meeting/discharge planning   15 2 1400 [facility] [place]

## 2020-06-14 NOTE — PROGRESS NOTES
2046: Patient transport arrived to transport patient to [facility]. PIV removed. Telemetry box removed. Patients belongings with patient. No signs of distress noted. 2050: Patient discharged.

## 2020-06-14 NOTE — PROGRESS NOTES
Problem: Falls - Risk of  Goal: *Absence of Falls  Description: Document Jesus Sosa Fall Risk and appropriate interventions in the flowsheet.   Outcome: Resolved/Met  Note: Fall Risk Interventions:  Mobility Interventions: Assess mobility with egress test         Medication Interventions: Bed/chair exit alarm, Patient to call before getting OOB, Teach patient to arise slowly                   Problem: Patient Education: Go to Patient Education Activity  Goal: Patient/Family Education  Outcome: Resolved/Met     Problem: Suicide  Goal: *STG: Remains safe in hospital  Outcome: Resolved/Met  Goal: *STG: Seeks staff when feelings of self harm or harm towards others arise  Outcome: Resolved/Met  Goal: *STG: Attends activities and groups  Outcome: Resolved/Met  Goal: *STG:  Verbalizes alternative ways of dealing with maladaptive feelings/behaviors  Outcome: Resolved/Met  Goal: *STG/LTG: Complies with medication therapy  Outcome: Resolved/Met  Goal: *STG/LTG: No longer expresses self destructive or suicidal thoughts  Outcome: Resolved/Met  Goal: *LTG:  Identifies available community resources  Outcome: Resolved/Met  Goal: *LTG:  Develops proactive suicide prevention plan  Outcome: Resolved/Met  Goal: Interventions  Outcome: Resolved/Met     Problem: Alcohol Withdrawal  Goal: *STG: Participates in treatment plan  Outcome: Resolved/Met  Goal: *STG: Remains safe in hospital  Outcome: Resolved/Met  Goal: *STG: Seeks staff when symptoms of withdrawal increase  Outcome: Resolved/Met  Goal: *STG: Complies with medication therapy  Outcome: Resolved/Met  Goal: *STG: Attends activities and groups  Outcome: Resolved/Met  Goal: *STG: Will identify negative impact of chemical dependency including the use of tobacco, alcohol, and other substances  Outcome: Resolved/Met  Goal: *STG: Verbalizes abstinence as an achievable goal  Outcome: Resolved/Met  Goal: *STG: Agrees to participate in outpatient after care program to support ongoing mental health  Outcome: Resolved/Met  Goal: *STG: Able to indentify relapse triggers including interpersonal/social and familial factors  Outcome: Resolved/Met  Goal: *STG: Identify lifestyle changes to support long term sobriety such as vocation, employment, education, and legal issues  Outcome: Resolved/Met  Goal: *STG: Maintains appropriate nutrition and hydration  Outcome: Resolved/Met  Goal: *STG: Vital signs within defined limits  Outcome: Resolved/Met  Goal: *STG/LTG: Relapse prevention plan in place to include housing/aftercare, leisure activities, and spirituality  Outcome: Resolved/Met  Goal: Interventions  Outcome: Resolved/Met     Problem: Diabetes Self-Management  Goal: *Disease process and treatment process  Description: Define diabetes and identify own type of diabetes; list 3 options for treating diabetes. Outcome: Resolved/Met  Goal: *Incorporating nutritional management into lifestyle  Description: Describe effect of type, amount and timing of food on blood glucose; list 3 methods for planning meals. Outcome: Resolved/Met  Goal: *Incorporating physical activity into lifestyle  Description: State effect of exercise on blood glucose levels. Outcome: Resolved/Met  Goal: *Developing strategies to promote health/change behavior  Description: Define the ABC's of diabetes; identify appropriate screenings, schedule and personal plan for screenings. Outcome: Resolved/Met  Goal: *Using medications safely  Description: State effect of diabetes medications on diabetes; name diabetes medication taking, action and side effects. Outcome: Resolved/Met  Goal: *Monitoring blood glucose, interpreting and using results  Description: Identify recommended blood glucose targets  and personal targets.   Outcome: Resolved/Met  Goal: *Prevention, detection, treatment of acute complications  Description: List symptoms of hyper- and hypoglycemia; describe how to treat low blood sugar and actions for lowering  high blood glucose level.  Outcome: Resolved/Met  Goal: *Prevention, detection and treatment of chronic complications  Description: Define the natural course of diabetes and describe the relationship of blood glucose levels to long term complications of diabetes.   Outcome: Resolved/Met  Goal: *Developing strategies to address psychosocial issues  Description: Describe feelings about living with diabetes; identify support needed and support network  Outcome: Resolved/Met  Goal: *Insulin pump training  Outcome: Resolved/Met  Goal: *Sick day guidelines  Outcome: Resolved/Met  Goal: *Patient Specific Goal (EDIT GOAL, INSERT TEXT)  Outcome: Resolved/Met     Problem: Patient Education: Go to Patient Education Activity  Goal: Patient/Family Education  Outcome: Resolved/Met
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
## 2020-06-15 LAB
GLUCOSE BLD STRIP.AUTO-MCNC: 112 MG/DL (ref 65–100)
GLUCOSE BLD STRIP.AUTO-MCNC: 117 MG/DL (ref 65–100)
GLUCOSE BLD STRIP.AUTO-MCNC: 118 MG/DL (ref 65–100)
GLUCOSE BLD STRIP.AUTO-MCNC: 142 MG/DL (ref 65–100)
SERVICE CMNT-IMP: ABNORMAL

## 2020-06-15 PROCEDURE — 74011250637 HC RX REV CODE- 250/637: Performed by: NURSE PRACTITIONER

## 2020-06-15 PROCEDURE — 65220000003 HC RM SEMIPRIVATE PSYCH

## 2020-06-15 PROCEDURE — 74011636637 HC RX REV CODE- 636/637: Performed by: NURSE PRACTITIONER

## 2020-06-15 PROCEDURE — 74011250637 HC RX REV CODE- 250/637: Performed by: PSYCHIATRY & NEUROLOGY

## 2020-06-15 PROCEDURE — 82962 GLUCOSE BLOOD TEST: CPT

## 2020-06-15 PROCEDURE — 87635 SARS-COV-2 COVID-19 AMP PRB: CPT

## 2020-06-15 RX ORDER — GABAPENTIN 800 MG/1
800 TABLET ORAL 3 TIMES DAILY
COMMUNITY
End: 2020-06-17

## 2020-06-15 RX ORDER — SERTRALINE HYDROCHLORIDE 50 MG/1
100 TABLET, FILM COATED ORAL DAILY
Status: DISCONTINUED | OUTPATIENT
Start: 2020-06-16 | End: 2020-06-17 | Stop reason: HOSPADM

## 2020-06-15 RX ORDER — VALSARTAN 160 MG/1
160 TABLET ORAL DAILY
Status: ON HOLD | COMMUNITY
End: 2020-08-11 | Stop reason: SDUPTHER

## 2020-06-15 RX ORDER — METFORMIN HYDROCHLORIDE 1000 MG/1
1000 TABLET ORAL 2 TIMES DAILY WITH MEALS
COMMUNITY
End: 2020-06-17

## 2020-06-15 RX ORDER — GABAPENTIN 300 MG/1
300 CAPSULE ORAL 3 TIMES DAILY
Status: DISCONTINUED | OUTPATIENT
Start: 2020-06-15 | End: 2020-06-17 | Stop reason: HOSPADM

## 2020-06-15 RX ADMIN — LURASIDONE HYDROCHLORIDE 40 MG: 40 TABLET, FILM COATED ORAL at 16:35

## 2020-06-15 RX ADMIN — SERTRALINE HYDROCHLORIDE 50 MG: 50 TABLET ORAL at 08:39

## 2020-06-15 RX ADMIN — PANTOPRAZOLE SODIUM 40 MG: 40 TABLET, DELAYED RELEASE ORAL at 06:39

## 2020-06-15 RX ADMIN — FOLIC ACID 1 MG: 1 TABLET ORAL at 08:39

## 2020-06-15 RX ADMIN — ATORVASTATIN CALCIUM 10 MG: 10 TABLET, FILM COATED ORAL at 21:07

## 2020-06-15 RX ADMIN — AMLODIPINE BESYLATE 5 MG: 5 TABLET ORAL at 08:39

## 2020-06-15 RX ADMIN — FUROSEMIDE 40 MG: 40 TABLET ORAL at 08:39

## 2020-06-15 RX ADMIN — Medication 100 MG: at 08:39

## 2020-06-15 RX ADMIN — GABAPENTIN 300 MG: 300 CAPSULE ORAL at 16:35

## 2020-06-15 RX ADMIN — GABAPENTIN 300 MG: 300 CAPSULE ORAL at 21:07

## 2020-06-15 RX ADMIN — INSULIN LISPRO 2 UNITS: 100 INJECTION, SOLUTION INTRAVENOUS; SUBCUTANEOUS at 08:40

## 2020-06-15 NOTE — PROGRESS NOTES
Admission Medication Reconciliation:    Information obtained from:  Review of EMR  RxQuery data available¹:  YES    Comments/Recommendations: Updated PTA meds/reviewed patient's allergies. 1)  Upon review of EMR, it seems that a pharmacist completed an admission medication reconciliation while the patient was still on the medical unit. During that stay, the medical providers held his amlodipine, valsartan, metformin, and lurasidone due to his acute medical status. Per the discharge summary, consider restarting valsartan and metformin when his renal function is back at baseline. Gabapentin was also added but the patient reports he had not been taking it for ~ 1 month. He stated that he was having difficulties with getting his medications filled through [facility] mail order but he did receive them just prior to his admission. 2)  Medication changes (since last review): Added  - valsartan 160mg daily  - metformin 1000mg BID  - lurasidone 160mg daily with dinner  - gabapentin 800mg TID    Removed  - sliding scale insulin (from medical admission)    3)  The [address] [place] [address] Program ([facility]) was assessed to determine fill history of any controlled medications. The patient has filled the following controlled medications in the last 6 months. - 6/3/20: gabapentin 800mg, #90 for 30 day supply  - 5/22/20: hydrocodone/APAP 5/325mg, #12 for 3 day supply  - 4/21/20 gabapentin 800mg, #30 for 10 day supply  - 2/14/20: gabapentin 600mg, #90 for 30 day supply   ¹RxQuery pharmacy benefit data reflects medications filled and processed through the patient's insurance, however   this data does NOT capture whether the medication was picked up or is currently being taken by the patient.     Allergies:  Tramadol and Lisinopril    Significant PMH/Disease States:   Past Medical History:   Diagnosis Date    Chronic pain     Diabetes ([facility] [address]     Hepatitis C     Hypertension      Chief Complaint for this Admission:  No chief complaint on file. Prior to Admission Medications:   Prior to Admission Medications   Prescriptions Last Dose Informant Taking? amLODIPine (NORVASC) 5 mg tablet 2020 Self No   Sig: Take 1 Tab by mouth daily. Indications: pressure   aspirin delayed-release 81 mg tablet Unknown at Unknown time Self No   Sig: Take 1 Tab by mouth daily. Indications: prevent heart problem   atorvastatin (LIPITOR) 10 mg tablet Unknown at Unknown time Self No   Sig: Take 1 Tab by mouth daily. Indications: prevent stroke   diclofenac (VOLTAREN) 1 % gel Unknown at Unknown time Self No   Sig: AAA in thin coat BID as needed for painful joints   folic acid (FOLVITE) 1 mg tablet Unknown at Unknown time Self No   Sig: Take 1 Tab by mouth daily. gabapentin (NEURONTIN) 800 mg tablet 5/15/2020 at Unknown time  Yes   Sig: Take 800 mg by mouth three (3) times daily. lurasidone (LATUDA) 80 mg tab tablet   Yes   Sig: Take 160 mg by mouth daily (with dinner). metFORMIN (GLUCOPHAGE) 1,000 mg tablet 2020  Yes   Sig: Take 1,000 mg by mouth two (2) times daily (with meals). nicotine (NICODERM CQ) 14 mg/24 hr patch   No   Si Patch by TransDERmal route daily for 30 days. sertraline (ZOLOFT) 50 mg tablet  Self No   Sig: Take 1 Tab by mouth daily. Indications: nerves   thiamine HCL (B-1) 100 mg tablet  Self No   Sig: Take 1 Tab by mouth daily. valsartan (DIOVAN) 160 mg tablet   Yes   Sig: Take 160 mg by mouth daily.       Facility-Administered Medications: None     JUANY Caceres

## 2020-06-15 NOTE — PROGRESS NOTES
Problem: Depressed Mood (Adult/Pediatric)  Goal: *STG: Participates in treatment plan  Outcome: Progressing Towards Goal  Goal: *STG: Attends activities and groups  Outcome: Progressing Towards Goal  Goal: *STG: Complies with medication therapy  Outcome: Progressing Towards Goal   Patient is calm, cooperative within unit. Affect constricted, mood sad, depressed. . Medication compliant. States that appetite is slowly improving.

## 2020-06-15 NOTE — BH NOTES
PSYCHOSOCIAL ASSESSMENT  :Patient identifying info:  Chucho Díaz is a 63 y.o., male admitted 6/13/2020 10:04 PM     Presenting problem and precipitating factors: He was transferred from a medical unit due to an overdose of his medications , ETOH and ibuprofen. He reports he has been unable to get his medications filled due to change in his insurance and COVID 19 . He has been more depressed , hopeless and helpless and also reports issues with his daughter. Mental status assessment:alert , oriented , denies suicidal ideations , thought process is logical and no delusional content     Strengths: supportive family - willingness to seek treatment     Collateral information:   Almeda Heimlich [phone]       Current psychiatric /substance abuse providers and contact info: no current provider     Previous psychiatric/substance abuse providers and response to treatment:he was hopitalized at [facility] in  2015. He has a history of overdosing. [facility] last year, he has admissions to [facility] and to [facility]. Family history of mental illness or substance abuse: none noted     Substance abuse history:    Social History     Tobacco Use    Smoking status: Current Every Day Smoker     Packs/day: 1.00     Types: Cigarettes     Last attempt to quit: 9/1/2016     Years since quitting: 3.7    Smokeless tobacco: Never Used   Substance Use Topics    Alcohol use: Not Currently     Alcohol/week: 0.0 standard drinks     Comment: Hx of abuse- denies use tonight-smells of ETOH       History of biomedical complications associated with substance abuse :none noted     Patient's current acceptance of treatment or motivation for change:poor     Family constellation:single, he has 3 adult children     Is significant other involved?        Describe support system: brother     Describe living arrangements and home environment:living with his brother     Health issues:   Hospital Problems  Date Reviewed: 11/29/2017          Codes Class Noted POA    Substance induced mood disorder [facility]) ICD-10-CM: F19.94  ICD-9-CM: 292.84  2020 Unknown              Trauma history: none noted     Legal issues: yes, on on probation     History of  service: no    Financial status: SSDI     Confucianism/cultural factors: none noted     Education/work history: unknown    Have you been licensed as a health care professional (current or ): no     Leisure and recreation preferences: unknown     Describe coping skills:ineffectual    Assunta Prim  6/15/2020

## 2020-06-15 NOTE — H&P
[facility] HISTORY AND PHYSICAL    Name:  Refugio Lal  MR#:  [id number]  :  1960  ACCOUNT #:  [de-identified]  ADMIT DATE:  2020    INITIAL PSYCHIATRIC EVALUATION    CHIEF COMPLAINT:  \"I overdosed. \"    HISTORY OF PRESENTING ILLNESS:  The patient is a 70-year-old male, who is currently admitted at [address] [place] [address] on a voluntary basis. He was at [facility] Unit for a few days and was cleared for medical transfer here at [facility]. He presented to the emergency room after an intentional overdose of 8-10 tablets of ibuprofen 800 mg, 8 tablets of metformin 500 mg, and 1 or 2 tablets of amlodipine. His urine drug screen was positive for barbiturates and blood alcohol level on admission was 200. He states that he has been off his medications for a whole month. He has been having a hard time getting his medications due to Coronavirus. He states that he started feeling depressed, hopeless, and helpless since being off his medications. He is supposed to be taking Zoloft and [place]. He has a significant history of alcohol dependence. He drank two quarts of alcohol last Saturday, but denies it a problem. He has history of overdosing about three times. His last suicide attempt was in 2017. He has a pending court date in July due to probation violation from an absconded discharge. He states that he left [address] [place] and went to [facility] without notifying to his . He also has been overwhelmed with his health that has been declining. He lost his home here in [address] and was forced to move in with his brother in [place]. He continues to endorse passive thoughts of hurting himself, but denies homicidal ideation, auditory or visual hallucination.     PAST MEDICAL HISTORY:  See H and P.    Labs: (reviewed/updated 6/15/2020)  Patient Vitals for the past 8 hrs:   BP Temp Pulse Resp SpO2   06/15/20 0747 118/82 98.1 °F (36.7 °C) 90 16 97 %     Labs Reviewed   GLUCOSE, POC - Abnormal; Notable for the following components:       Result Value    Glucose (POC) 119 (*)     All other components within normal limits   GLUCOSE, POC - Abnormal; Notable for the following components:    Glucose (POC) 112 (*)     All other components within normal limits   GLUCOSE, POC - Abnormal; Notable for the following components:    Glucose (POC) 103 (*)     All other components within normal limits   GLUCOSE, POC - Abnormal; Notable for the following components:    Glucose (POC) 178 (*)     All other components within normal limits   GLUCOSE, POC - Abnormal; Notable for the following components:    Glucose (POC) 142 (*)     All other components within normal limits     Lab Results   Component Value Date/Time    Sodium 139 06/13/2020 10:48 AM    Potassium 4.1 06/13/2020 10:48 AM    Chloride 106 06/13/2020 10:48 AM    CO2 30 06/13/2020 10:48 AM    Anion gap 3 (L) 06/13/2020 10:48 AM    Glucose 118 (H) 06/13/2020 10:48 AM    BUN 24 (H) 06/13/2020 10:48 AM    Creatinine 1.67 (H) 06/13/2020 10:48 AM    BUN/Creatinine ratio 14 06/13/2020 10:48 AM    GFR est AA 51 (L) 06/13/2020 10:48 AM    GFR est non-AA 42 (L) 06/13/2020 10:48 AM    Calcium 8.0 (L) 06/13/2020 10:48 AM    Bilirubin, total 0.6 06/13/2020 10:48 AM    Alk.  phosphatase 48 06/13/2020 10:48 AM    Protein, total 7.2 06/13/2020 10:48 AM    Albumin 3.5 06/13/2020 10:48 AM    Globulin 3.7 06/13/2020 10:48 AM    A-G Ratio 0.9 (L) 06/13/2020 10:48 AM    ALT (SGPT) 30 06/13/2020 10:48 AM     Admission on 06/13/2020   Component Date Value Ref Range Status    Glucose (POC) 06/14/2020 119* 65 - 100 mg/dL Final    Performed by 06/14/2020 Bessie Cote   Final    Glucose (POC) 06/14/2020 112* 65 - 100 mg/dL Final    Performed by 06/14/2020 Bessie Cote   Final    Glucose (POC) 06/14/2020 103* 65 - 100 mg/dL Final    Performed by 06/14/2020 HITESH DUPONT (CON)   Final    Glucose (POC) 06/14/2020 178* 65 - 100 mg/dL Final    Performed by 06/14/2020 HITESH DUPONT (CON)   Final    Glucose (POC) 06/15/2020 142* 65 - 100 mg/dL Final    Performed by 06/15/2020 Socorro Campuzano   Final     Vitals:    06/14/20 1029 06/14/20 1517 06/14/20 2034 06/15/20 0747   BP: 111/75 131/83 101/67 118/82   Pulse: 70 83 86 90   Resp: 16 16 16 16   Temp: 98 °F (36.7 °C) 98.1 °F (36.7 °C) 98.1 °F (36.7 °C) 98.1 °F (36.7 °C)   SpO2: 98% 97% 98% 97%   Weight:       Height:         Recent Results (from the past 24 hour(s))   GLUCOSE, POC    Collection Time: 06/14/20 11:18 AM   Result Value Ref Range    Glucose (POC) 112 (H) 65 - 100 mg/dL    Performed by Hong Dow, POC    Collection Time: 06/14/20  4:04 PM   Result Value Ref Range    Glucose (POC) 103 (H) 65 - 100 mg/dL    Performed by [employer]imitry COVARRUBIASTHY (CON)    GLUCOSE, POC    Collection Time: 06/14/20  8:15 PM   Result Value Ref Range    Glucose (POC) 178 (H) 65 - 100 mg/dL    Performed by [employer]imitry COVARRUBIASTHY (CON)    GLUCOSE, POC    Collection Time: 06/15/20  7:24 AM   Result Value Ref Range    Glucose (POC) 142 (H) 65 - 100 mg/dL    Performed by [email or web address]:  Results from Hospital Encounter encounter on 06/10/20   XR CHEST PORT    Narrative EXAM: XR CHEST PORT    INDICATION: overdose. COMPARISON: 2015    FINDINGS: A portable AP radiograph of the chest was obtained at 0626 hours. The  patient is on a cardiac monitor. The lungs are clear. There is atherosclerosis  of the aorta. The bones and soft tissues are grossly within normal limits. Impression IMPRESSION: No acute findings. Xr Spine Lumb Min 4 V    Result Date: 5/21/2020  EXAM:  Lumbar spine INDICATION:  2/9/2019 COMPARISON: None. FINDINGS: AP, lateral, bilateral oblique and spot lateral views of the lumbar spine demonstrate normal alignment. The vertebral body heights and disc spaces are well-preserved. There is no spondylolysis or spondylolisthesis.   There is no fracture, subluxation or other abnormality. Facet arthropathy is noted at the lower 2 levels, asymmetric to the left. Spondylitic changes most pronounced at L1-2. IMPRESSION:  Lower lumbar facet arthropathy. No acute process identified     Xr Knee Lt Min 4 V    Result Date: 5/21/2020  EXAM: XR KNEE LT MIN 4 V INDICATION: knee pain. COMPARISON: None. FINDINGS: Four views of the left knee demonstrate no fracture or other acute osseous or articular abnormality. There is a small joint effusion. IMPRESSION: Small left knee effusion    Xr Knee Rt Min 4 V    Result Date: 5/21/2020  EXAM: XR KNEE RT MIN 4 V INDICATION: Right knee pain. COMPARISON: None. FINDINGS: Four views of the right knee demonstrate no fracture or other acute osseous or articular abnormality. There is no effusion. There is medial joint space narrowing with osteophytosis. IMPRESSION: No acute abnormality. Mild medial compartment osteoarthritis. Xr Ankle Lt Min 3 V    Result Date: 5/30/2020  EXAM: XR ANKLE LT MIN 3 V INDICATION: Left ankle pain since fall and injury one day ago. COMPARISON: None. FINDINGS: Three views of the left ankle demonstrate no fracture or disruption of the ankle mortise. There is no other acute osseous or articular abnormality. The soft tissues are within normal limits. Joints are within normal limits. IMPRESSION: Normal left ankle views. Xr Foot Lt Min 3 V    Result Date: 5/30/2020  EXAM: XR FOOT LT MIN 3 V INDICATION: Left foot pain since injury one day ago. COMPARISON: None. FINDINGS: Three views of the left foot demonstrate no fracture or other acute osseous or articular abnormality. The soft tissues are within normal limits. Mild first MTP joint osteoarthritis. IMPRESSION: No acute abnormality. Us Retroperitoneum Comp    Result Date: 6/12/2020  RENAL ULTRASOUND INDICATION: Acute renal failure COMPARISON: None. TECHNIQUE: Sonography of the kidneys, retroperitoneum, and bladder was performed.  FINDINGS: RIGHT KIDNEY: 12.6 cm in length. Normal cortical echogenicity. No hydronephrosis, shadowing calculus, or contour-deforming renal mass. LEFT KIDNEY: 11.0 cm in length. Normal cortical echogenicity. No hydronephrosis, shadowing calculus, or contour-deforming renal mass. AORTA: Obscured by overlying bowel gas. COMMON ILIAC ARTERIES: Obscured by overlying bowel gas. IVC: Obscured by overlying bowel gas. BLADDER: Normally distended. IMPRESSION: Normal renal ultrasound. Xr Chest Port    Result Date: 6/11/2020  EXAM: XR CHEST PORT INDICATION: overdose. COMPARISON: 2015 FINDINGS: A portable AP radiograph of the chest was obtained at 0626 hours. The patient is on a cardiac monitor. The lungs are clear. There is atherosclerosis of the aorta. The bones and soft tissues are grossly within normal limits. IMPRESSION: No acute findings. PAST PSYCHIATRIC HISTORY:  He was admitted at [facility] a year ago due to suicidal ideation. He also has been admitted at [facility] in 2009 and [facility] in 2013. He is currently taking Zoloft and Lenell Chambers and it is currently being prescribed by Dr. Rodrigo Diaz. PSYCHOSOCIAL HISTORY:  He is single. He has never been . He has 3 children. He is currently receiving social security disability checks. He was in the army for 2-1/2 years. He lives with his brother. MENTAL STATUS EXAMINATION:  He is alert and oriented in all spheres. He is dressed in hospital apparel. He is calm and pleasant during the interview. He continues to endorse passive thoughts of hurting himself, but denies homicidal ideation, auditory or visual hallucination. Memory seems intact. Intelligence seems average. Insight is partial.  Judgment is poor. DIAGNOSIS:  Major depressive disorder, recurrent, severe, without psychotic features. TREATMENT PLANNING:  I will continue his inpatient stay. He will be provided with support and encouraged to attend groups.   His safety will be monitored. His medications will be modified and assessed. Case Management will work on discharge planning. ASSETS AND STRENGTHS:  He is willing to seek help. He is willing to take medications. ESTIMATED LENGTH OF STAY:  5-7 days.         ARUN GONZALEZ NP      [id number]  D:  06/14/2020 18:14  T:  06/14/2020 23:51  JOB #:  [id number]

## 2020-06-15 NOTE — BH NOTES
Chief Complaint:  I am a little better. Length of Stay: 2 Days    Interval History:  Susana Zhu reports feeling better today except for poor sleep. Says he only slept 3 hours although nursing staff report that he slept 7 hours. Feels his appetite is \"picking up\". Says he attempted suicide because he had been off medications for a month. Mr. mAa Watson reports that he is going back to live with his brother in [place] and the family wants him to be tested for Covid-19. Denies any active SI or plan. Past Medical History:  Past Medical History:   Diagnosis Date    Chronic pain     Diabetes ([facility] [address]     Hepatitis C     Hypertension            Labs:  Lab Results   Component Value Date/Time    WBC 4.9 06/13/2020 12:43 AM    HGB 12.5 06/13/2020 12:43 AM    HCT 36.4 (L) 06/13/2020 12:43 AM    PLATELET 995 03/14/0886 12:43 AM    MCV 88.3 06/13/2020 12:43 AM      Lab Results   Component Value Date/Time    Sodium 139 06/13/2020 10:48 AM    Potassium 4.1 06/13/2020 10:48 AM    Chloride 106 06/13/2020 10:48 AM    CO2 30 06/13/2020 10:48 AM    Anion gap 3 (L) 06/13/2020 10:48 AM    Glucose 118 (H) 06/13/2020 10:48 AM    BUN 24 (H) 06/13/2020 10:48 AM    Creatinine 1.67 (H) 06/13/2020 10:48 AM    BUN/Creatinine ratio 14 06/13/2020 10:48 AM    GFR est AA 51 (L) 06/13/2020 10:48 AM    GFR est non-AA 42 (L) 06/13/2020 10:48 AM    Calcium 8.0 (L) 06/13/2020 10:48 AM    Bilirubin, total 0.6 06/13/2020 10:48 AM    Alk.  phosphatase 48 06/13/2020 10:48 AM    Protein, total 7.2 06/13/2020 10:48 AM    Albumin 3.5 06/13/2020 10:48 AM    Globulin 3.7 06/13/2020 10:48 AM    A-G Ratio 0.9 (L) 06/13/2020 10:48 AM    ALT (SGPT) 30 06/13/2020 10:48 AM      Vitals:    06/14/20 1517 06/14/20 2034 06/15/20 0747 06/15/20 1127   BP: 131/83 101/67 118/82 (!) 137/91   Pulse: 83 86 90 98   Resp: 16 16 16 16   Temp: 98.1 °F (36.7 °C) 98.1 °F (36.7 °C) 98.1 °F (36.7 °C) 98 °F (36.7 °C)   SpO2: 97% 98% 97% 98%   Weight:       Height:             Current Facility-Administered Medications   Medication Dose Route Frequency Provider Last Rate Last Dose    lurasidone (LATUDA) tablet 40 mg  40 mg Oral DAILY WITH DINNER Thelma Zhang NP   40 mg at 06/14/20 1817    OLANZapine (ZyPREXA) tablet 5 mg  5 mg Oral Q6H PRN Lender Mcintyre' Vazquez Jenkins, FNP        haloperidol lactate (HALDOL) injection 5 mg  5 mg IntraMUSCular Q6H PRN Lender BrowningLamar Anatoly, FNP        benztropine (COGENTIN) tablet 1 mg  1 mg Oral BID PRN Lender BrowningLamar Anatoly, FNP        diphenhydrAMINE (BENADRYL) injection 50 mg  50 mg IntraMUSCular BID PRN Lender Mcintyre' V, FNP        hydrOXYzine HCL (ATARAX) tablet 50 mg  50 mg Oral TID PRN Lender BrowningLamar Anatoly, FNP        LORazepam (ATIVAN) injection 1 mg  1 mg IntraMUSCular Q4H PRN Lender Mcintyre' V, FNP        traZODone (DESYREL) tablet 50 mg  50 mg Oral QHS PRN Lender Mcintyre' V, FNP        acetaminophen (TYLENOL) tablet 650 mg  650 mg Oral Q4H PRN Lender Mcintyre' V, FNP        magnesium hydroxide (MILK OF MAGNESIA) 400 mg/5 mL oral suspension 30 mL  30 mL Oral DAILY PRN Lender Mcintyre' V, FNP        nicotine (NICODERM CQ) 14 mg/24 hr patch 1 Patch  1 Patch TransDERmal DAILY Lender Mcintyre' Vazquez Jenkins, FNP   1 Patch at 06/15/20 0839    amLODIPine (NORVASC) tablet 5 mg  5 mg Oral DAILY Lender Mcintyre' V, FNP   5 mg at 06/15/20 7656    atorvastatin (LIPITOR) tablet 10 mg  10 mg Oral QHS Lender Mcintyre' V, FNP   10 mg at 06/14/20 2110    furosemide (LASIX) tablet 40 mg  40 mg Oral DAILY Lender Mcintyre' V, FNP   40 mg at 06/15/20 0839    pantoprazole (PROTONIX) tablet 40 mg  40 mg Oral ACB Lender Mcintyre' V, FNP   40 mg at 06/15/20 7236    sertraline (ZOLOFT) tablet 50 mg  50 mg Oral DAILY Lender Mcintyre' V, FNP   50 mg at 44/26/93 7434    folic acid (FOLVITE) tablet 1 mg  1 mg Oral DAILY Fredy Lawrence V, FNP   1 mg at 06/15/20 2421    thiamine HCL (B-1) tablet 100 mg  100 mg Oral DAILY Fredy Lawrence V, FNP   100 mg at 06/15/20 0839    insulin lispro (HUMALOG) injection   SubCUTAneous AC&HS Richar Moreno, DARRIN   Stopped at 06/15/20 1130    glucose chewable tablet 16 g  4 Tab Oral PRN Richar Moreno, DARRIN        glucagon (GLUCAGEN) injection 1 mg  1 mg IntraMUSCular PRN Richar Moreno, DARRIN        dextrose 10% infusion 0-250 mL  0-250 mL IntraVENous PRN Richar Melter, NP             Mental Status Exam:  Eye contact: fair  Grooming: fair  Psychomotor activity: decreased  Speech is spontaneous  Mood is \"okay\"  Affect: constricted  Perception: Denies any AH or VH. Suicidal ideation: Denies any SI or plan. Cognition is grossly intact       Physical Exam:  Body habitus: Body mass index is 28.13 kg/m². Musculoskeletal system: normal gait  Tremor - neg  Cog wheeling - neg      Assessment and Plan:  Simon العلي meets criteria for a diagnosis of Major depressive disorder, recurrent, severe, without psychotic features. Continue the medication regimen as prescribed  Disposition planning to continue. I certify that this patients inpatient psychiatric hospital services furnished since the previous certification were, and continue to be, required for treatment that could reasonably be expected to improve the patient's condition, or for diagnostic study, and that the patient continues to need, on a daily basis, active treatment furnished directly by or requiring the supervision of inpatient psychiatric facility personnel. In addition, the hospital records show that services furnished were intensive treatment services, admission or related services, or equivalent services.

## 2020-06-15 NOTE — BH NOTES
Behavioral Health Interdisciplinary Rounds     Patient Name: Audrey Krueger  Age: 63 y.o. Room/Bed:  728/  Primary Diagnosis: <principal problem not specified>   Admission Status: Voluntary     Readmission within 30 days: no  Power of  in place: no  Patient requires a blocked bed: no          Reason for blocked bed:     VTE Prophylaxis:     Mobility needs/Fall risk:   Flu Vaccine :    Nutritional Plan:   Consults:          Labs/Testing due today?:     Sleep hours:        Participation in Care/Groups:    Medication Compliant?:   PRNS (last 24 hours):     Restraints (last 24 hours):       CIWA (range last 24 hours): CIWA-Ar Total: 0   COWS (range last 24 hours):      Alcohol screening (AUDIT) completed -   AUDIT Score: 0     If applicable, date SBIRT discussed in treatment team AND documented:   AUDIT Screen Score: AUDIT Score: 0    Tobacco - patient is a smoker: Have You Used Tobacco in the Past 30 Days: Yes  Illegal Drugs use: Have You Used Any Illegal Substances Over the Past 12 Months: No    24 hour chart check complete:     Patient goal(s) for today:   Treatment team focus/goals: Plan to assess for medication and discharge needs. Progress note :He was pleasant and compliant with treatment team.  He denied SI/HI today in treatment team is asking to have his medications restarted. LOS:  2  Expected LOS: TBD    Financial concerns/prescription coverage: [employer]   Family contact:   Svitlana Bates [phone]          Family requesting physician contact today:  No   Discharge plan: he will to his brothers home.    Access to weapons : no       Outpatient provider(s): he will need follow up   Patient's preferred phone number for follow up call : [phone]    Participating treatment team members: Laureen Sen Dr., PharmD. - Chere Kocher, NICHOLAS

## 2020-06-15 NOTE — CONSULTS
[facility]  CLINICAL NURSE SPECIALIST CONSULT  PROGRAM FOR DIABETES HEALTH    INITIAL NOTE    Presentation   Diana Finley is a 63 y.o. male with a PMH of diabetes, Hepatits C, HTN, depression, tobacco use and alcohol abuse who presented to the ED after an attempted suicide attempt with a drug overdose. He was admitted to step-down telemetry for lactic acidosis with RILEY, hypothermia and CIWA protocol. Once medically stabilized, he was transferred to inpatient behavioral health for management of mood disorder. Diabetes: Patient has a six year history of type two diabetes, treated with metformin PTA. Admitting A1C 6.7%    Consulted by Provider for advanced diabetes nursing assessment and care, specifically related to     [x] Home management assessment      Diabetes-related medical history  Acute complications: RILEY  Neurological complications  Peripheral neuropathy  Microvascular disease: None  Macrovascular disease: None  Other associated conditions     Depression ; Hypothyroidism    Diabetes medication history  Drug class Currently in use Discontinued Never used   Biguanide Metformin 1000 mg BID     DDP-4 inhibitor       Sulfonylurea      Thiazolidinedione      GLP-1 RA      SGLT-2 inhibitors      Basal insulin      Fixed Dose  Combinations  70/30 Insulin    Bolus insulin        Subjective   I have had diabetes for about 6 years. I was out of metformin for a month or so but just got a refill the day I came into the hospital.  I also just got a glucometer but need strips. I can't remember what kind I use.     Off medications x1 month- history of inconsistent metformin use  Alcohol abuse  He lost his home here in [place] and was forced to move in with his brother in [place].    Does not monitor blood glucose    Social determinants of health impacting diabetes self-management practices   Missing health appointments or obtaining medications due to lack of reliable transportation and Struggling with anxiety and/or depression    Objective   Physical exam  General Alert, oriented and in no acute distress/ill-appearing. Conversant and cooperative. Vital Signs   Visit Vitals  /82   Pulse 90   Temp 98.1 °F (36.7 °C)   Resp 16   Ht 5' 9\" (1.753 m)   Wt 86.4 kg (190 lb 8 oz)   SpO2 97%   BMI 28.13 kg/m²     Skin  Warm and dry. No canthosis noted along neckline. Heart   Regular rate and rhythm. No murmurs, rubs or gallops  Lungs  Clear to auscultation without rales or rhonchi  Extremities No foot wounds    Diabetic foot exam:    Left Foot     Visual Exam: normal    Pulse DP: 2+ (normal)   Filament test: reduced sensation    Vibratory sensation: diminished  Right Foot   Visual Exam: callous - Skin graft on top of foot. Grafted 1 year ago from a pot of boiling water falling    Pulse DP: 2+ (normal)   Filament test: reduced sensation    Vibratory sensation: diminished DP & PT pulses +2. Laboratory  Lab Results   Component Value Date/Time    Hemoglobin A1c 6.7 (H) 06/12/2020 03:53 PM     Lab Results   Component Value Date/Time    LDL, calculated Comment 11/29/2017 02:41 PM     Lab Results   Component Value Date/Time    Creatinine 1.67 (H) 06/13/2020 10:48 AM     Lab Results   Component Value Date/Time    Sodium 139 06/13/2020 10:48 AM    Potassium 4.1 06/13/2020 10:48 AM    Chloride 106 06/13/2020 10:48 AM    CO2 30 06/13/2020 10:48 AM    Anion gap 3 (L) 06/13/2020 10:48 AM    Glucose 118 (H) 06/13/2020 10:48 AM    BUN 24 (H) 06/13/2020 10:48 AM    Creatinine 1.67 (H) 06/13/2020 10:48 AM    BUN/Creatinine ratio 14 06/13/2020 10:48 AM    GFR est AA 51 (L) 06/13/2020 10:48 AM    GFR est non-AA 42 (L) 06/13/2020 10:48 AM    Calcium 8.0 (L) 06/13/2020 10:48 AM    Bilirubin, total 0.6 06/13/2020 10:48 AM    Alk.  phosphatase 48 06/13/2020 10:48 AM    Protein, total 7.2 06/13/2020 10:48 AM    Albumin 3.5 06/13/2020 10:48 AM    Globulin 3.7 06/13/2020 10:48 AM    A-G Ratio 0.9 (L) 06/13/2020 10:48 AM    ALT (SGPT) 30 06/13/2020 10:48 AM     Lab Results   Component Value Date/Time    ALT (SGPT) 30 06/13/2020 10:48 AM       Blood glucose pattern        Assessment and Plan   Nursing Diagnosis Risk for unstable blood glucose pattern   Nursing Intervention Domain 8186 Decision-making Support   Nursing Interventions Examined current inpatient diabetes control   Explored factors facilitating and impeding inpatient management  Identified self-management practices impeding diabetes control  Explored corrective strategies with patient and responsible inpatient provider   Informed patient of rational for insulin strategy while hospitalized  Instructed patient in A1C, inpatient blood sugar goals, metformin and RILEY, resuming medication on discharge     Evaluation   Johny Silva is a 63 y.o. male with a PMH of diabetes, Hepatits C, HTN, depression, tobacco use and alcohol abuse who presented to the ED after an attempted suicide attempt with a drug overdose. He was admitted to step-down telemetry for lactic acidosis with RILEY, hypothermia and CIWA protocol. Once medically stabilized, he was transferred to inpatient behavioral health for management of mood disorder. His metformin has been held since admission due to RILEY and blood glucose has been in goal without any antihyperglycemic agents, including insulin. It would be worth observing his glucose until appetite improved- at this time having 25-50% of mealtime consumption. RILEY nearly resolved on labs two days ago and there is low suspicion of hyperglycemia given his low A1C of 6.7%. Recommendations   Recommend:  1. Continue ACHS POC glucose with correctional insulin, please adjust scale to start at a blood glucose of 200.    2. If patient's blood glucose remains in goal of 100-180 with consistent oral intake (meal consumption over 50%) - can discontinue correctional insulin and follow glucose on routine labs.     On Discharge:  Patient will need a FUV with PCP to discuss re-starting metformin  Patient will need to perform a fasting blood glucose daily upon discharge. To create a log of values and present to PCP for interpretation. Billing Code(s)     Thank you for including us in their care. I spent 45 minutes in direct patient care today for this patient.   Time includes chart review, face to face with patient and collaboration with interdisciplinary care team.       Celio Loyd, CNS  Program for Diabetes Health  Access via  [phone]

## 2020-06-15 NOTE — PROGRESS NOTES
Problem: Depressed Mood (Adult/Pediatric)  Goal: *STG: Participates in treatment plan  6/15/2020 1951 by Navi Acevedo  Outcome: Progressing Towards Goal  6/15/2020 1949 by Navi Acevedo  Outcome: Progressing Towards Goal  Goal: *STG: Remains safe in hospital  6/15/2020 1951 by Navi Acevedo  Outcome: Progressing Towards Goal  6/15/2020 1949 by Navi Acevedo  Outcome: Progressing Towards Goal  Goal: *STG: Complies with medication therapy  6/15/2020 1951 by Navi Acevedo  Outcome: Progressing Towards Goal  6/15/2020 1949 by Navi Acevedo  Outcome: Progressing Towards Goal   Patient mood remains sad, depressed. Spent most of the shift in his bedroom. He is medication and meal compliant, NAD. No s/s of A/V hallucinations, denies SI. He is able to make his needs known. Will continue to monitor and provide support as needed.

## 2020-06-15 NOTE — PROGRESS NOTES
Laboratory Monitoring for Antipsychotics: This patient is currently prescribed the following medication(s):   Current Facility-Administered Medications   Medication Dose Route Frequency    lurasidone (LATUDA) tablet 40 mg  40 mg Oral DAILY WITH DINNER    nicotine (NICODERM CQ) 14 mg/24 hr patch 1 Patch  1 Patch TransDERmal DAILY    amLODIPine (NORVASC) tablet 5 mg  5 mg Oral DAILY    atorvastatin (LIPITOR) tablet 10 mg  10 mg Oral QHS    furosemide (LASIX) tablet 40 mg  40 mg Oral DAILY    pantoprazole (PROTONIX) tablet 40 mg  40 mg Oral ACB    sertraline (ZOLOFT) tablet 50 mg  50 mg Oral DAILY    folic acid (FOLVITE) tablet 1 mg  1 mg Oral DAILY    thiamine HCL (B-1) tablet 100 mg  100 mg Oral DAILY    insulin lispro (HUMALOG) injection   SubCUTAneous AC&HS     The following labs have been completed for monitoring of antipsychotics and/or mood stabilizers:    Height, Weight, BMI Estimation  Estimated body mass index is 28.13 kg/m² as calculated from the following:    Height as of this encounter: 175.3 cm (69\"). Weight as of this encounter: 86.4 kg (190 lb 8 oz). Vital Signs/Blood Pressure  Visit Vitals  BP (!) 137/91   Pulse 98   Temp 98 °F (36.7 °C)   Resp 16   Ht 175.3 cm (69\")   Wt 86.4 kg (190 lb 8 oz)   SpO2 98%   BMI 28.13 kg/m²     Renal Function, Hepatic Function and Chemistry  Estimated Creatinine Clearance: 52.5 mL/min (A) (by C-G formula based on SCr of 1.67 mg/dL (H)).     Lab Results   Component Value Date/Time    Sodium 139 06/13/2020 10:48 AM    Potassium 4.1 06/13/2020 10:48 AM    Chloride 106 06/13/2020 10:48 AM    CO2 30 06/13/2020 10:48 AM    Anion gap 3 (L) 06/13/2020 10:48 AM    BUN 24 (H) 06/13/2020 10:48 AM    Creatinine 1.67 (H) 06/13/2020 10:48 AM    BUN/Creatinine ratio 14 06/13/2020 10:48 AM    Bilirubin, total 0.6 06/13/2020 10:48 AM    Protein, total 7.2 06/13/2020 10:48 AM    Albumin 3.5 06/13/2020 10:48 AM    Globulin 3.7 06/13/2020 10:48 AM    A-G Ratio 0.9 (L) 06/13/2020 10:48 AM    ALT (SGPT) 30 06/13/2020 10:48 AM    AST (SGOT) 17 06/13/2020 10:48 AM    Alk. phosphatase 48 06/13/2020 10:48 AM     Lab Results   Component Value Date/Time    Glucose 118 (H) 06/13/2020 10:48 AM    Glucose (POC) 112 (H) 06/15/2020 11:26 AM     Lab Results   Component Value Date/Time    Hemoglobin A1c 6.7 (H) 06/12/2020 03:53 PM     Hematology  Lab Results   Component Value Date/Time    WBC 4.9 06/13/2020 12:43 AM    RBC 4.12 06/13/2020 12:43 AM    HGB 12.5 06/13/2020 12:43 AM    HCT 36.4 (L) 06/13/2020 12:43 AM    MCV 88.3 06/13/2020 12:43 AM    MCH 30.3 06/13/2020 12:43 AM    MCHC 34.3 06/13/2020 12:43 AM    RDW 13.5 06/13/2020 12:43 AM    PLATELET 720 38/72/5307 12:43 AM     Lipids  Lab Results   Component Value Date/Time    Cholesterol, total 219 (H) 11/29/2017 02:41 PM    HDL Cholesterol 30 (L) 11/29/2017 02:41 PM    LDL, calculated Comment 11/29/2017 02:41 PM    Triglyceride 453 (H) 11/29/2017 02:41 PM    CHOL/HDL Ratio 3.5 05/15/2015 05:14 AM     Thyroid Function  Lab Results   Component Value Date/Time    TSH 0.07 (L) 06/12/2020 03:53 PM     Assessment/Plan:  Will order lipid panel to complete the recommended baseline laboratory monitoring based on the patient's current medication regimen.         Amparo Conner, PHARMD

## 2020-06-15 NOTE — INTERDISCIPLINARY ROUNDS
Behavioral Health Interdisciplinary Rounds Patient Name: Maria Dolores Simental  Age: 63 y.o. Room/Bed:  728/ Primary Diagnosis: <principal problem not specified> Admission Status: Voluntary Readmission within 30 days: no 
Power of  in place: no 
Patient requires a blocked bed: no          Reason for blocked bed: VTE Prophylaxis: No 
 
Mobility needs/Fall risk: no 
Flu Vaccine : n/a Nutritional Plan: no 
Consults:         
Labs/Testing due today?: no 
 
Sleep hours: 7 Participation in Care/Groups:  yes Medication Compliant?: Yes PRNS (last 24 hours): None Restraints (last 24 hours):  no 
  
CIWA (range last 24 hours): CIWA-Ar Total: 0  
COWS (range last 24 hours): Alcohol screening (AUDIT) completed -   AUDIT Score: 0 If applicable, date SBIRT discussed in treatment team AND documented:  
AUDIT Screen Score: AUDIT Score: 0 Tobacco - patient is a smoker: Have You Used Tobacco in the Past 30 Days: Yes Illegal Drugs use: Have You Used Any Illegal Substances Over the Past 12 Months: No 
 
24 hour chart check complete: yes Patient goal(s) for today:  
Treatment team focus/goals: Plan for discharge on Wednesday. Plan to increase Latuda to 800 mg today , plan for lab work Progress note : reports he is still not sleeping well,  still hearing negative voices, LOS:  3  Expected LOS:Wednesday Financial concerns/prescription coverage:  [id number] Family contact: brother Family requesting physician contact today:   
Discharge plan:he will return home with his brother Access to weapons :  no Outpatient provider(s): follow up at the  
Patient's preferred phone number for follow up call :  
 
Participating treatment team members: Arsenio Watson Dr.,, PharmD. - Harlan Alvarez RN

## 2020-06-16 LAB
BACTERIA SPEC CULT: NORMAL
CHOLEST SERPL-MCNC: 169 MG/DL
GLUCOSE BLD STRIP.AUTO-MCNC: 134 MG/DL (ref 65–100)
GLUCOSE BLD STRIP.AUTO-MCNC: 148 MG/DL (ref 65–100)
GLUCOSE BLD STRIP.AUTO-MCNC: 149 MG/DL (ref 65–100)
GLUCOSE BLD STRIP.AUTO-MCNC: 230 MG/DL (ref 65–100)
HDLC SERPL-MCNC: 42 MG/DL
HDLC SERPL: 4 {RATIO} (ref 0–5)
LDLC SERPL CALC-MCNC: 96.2 MG/DL (ref 0–100)
LIPID PROFILE,FLP: ABNORMAL
SARS-COV-2, COV2: NOT DETECTED
SERVICE CMNT-IMP: ABNORMAL
SERVICE CMNT-IMP: NORMAL
SOURCE, COVRS: NORMAL
SPECIMEN SOURCE, FCOV2M: NORMAL
TRIGL SERPL-MCNC: 154 MG/DL (ref ?–150)
VLDLC SERPL CALC-MCNC: 30.8 MG/DL

## 2020-06-16 PROCEDURE — 74011250637 HC RX REV CODE- 250/637: Performed by: NURSE PRACTITIONER

## 2020-06-16 PROCEDURE — 36415 COLL VENOUS BLD VENIPUNCTURE: CPT

## 2020-06-16 PROCEDURE — 65220000003 HC RM SEMIPRIVATE PSYCH

## 2020-06-16 PROCEDURE — 74011636637 HC RX REV CODE- 636/637: Performed by: NURSE PRACTITIONER

## 2020-06-16 PROCEDURE — 80061 LIPID PANEL: CPT

## 2020-06-16 PROCEDURE — 82962 GLUCOSE BLOOD TEST: CPT

## 2020-06-16 PROCEDURE — 74011250637 HC RX REV CODE- 250/637: Performed by: PSYCHIATRY & NEUROLOGY

## 2020-06-16 RX ORDER — TRAZODONE HYDROCHLORIDE 100 MG/1
100 TABLET ORAL
Status: DISCONTINUED | OUTPATIENT
Start: 2020-06-16 | End: 2020-06-17 | Stop reason: HOSPADM

## 2020-06-16 RX ADMIN — ATORVASTATIN CALCIUM 10 MG: 10 TABLET, FILM COATED ORAL at 21:13

## 2020-06-16 RX ADMIN — SERTRALINE HYDROCHLORIDE 100 MG: 50 TABLET ORAL at 08:32

## 2020-06-16 RX ADMIN — GABAPENTIN 300 MG: 300 CAPSULE ORAL at 21:13

## 2020-06-16 RX ADMIN — FUROSEMIDE 40 MG: 40 TABLET ORAL at 08:32

## 2020-06-16 RX ADMIN — INSULIN LISPRO 2 UNITS: 100 INJECTION, SOLUTION INTRAVENOUS; SUBCUTANEOUS at 11:27

## 2020-06-16 RX ADMIN — PANTOPRAZOLE SODIUM 40 MG: 40 TABLET, DELAYED RELEASE ORAL at 06:38

## 2020-06-16 RX ADMIN — FOLIC ACID 1 MG: 1 TABLET ORAL at 08:31

## 2020-06-16 RX ADMIN — Medication 100 MG: at 08:31

## 2020-06-16 RX ADMIN — GABAPENTIN 300 MG: 300 CAPSULE ORAL at 08:32

## 2020-06-16 RX ADMIN — LURASIDONE HYDROCHLORIDE 80 MG: 40 TABLET, FILM COATED ORAL at 16:32

## 2020-06-16 RX ADMIN — INSULIN LISPRO 2 UNITS: 100 INJECTION, SOLUTION INTRAVENOUS; SUBCUTANEOUS at 16:32

## 2020-06-16 RX ADMIN — INSULIN LISPRO 2 UNITS: 100 INJECTION, SOLUTION INTRAVENOUS; SUBCUTANEOUS at 21:13

## 2020-06-16 RX ADMIN — GABAPENTIN 300 MG: 300 CAPSULE ORAL at 16:32

## 2020-06-16 RX ADMIN — TRAZODONE HYDROCHLORIDE 100 MG: 100 TABLET ORAL at 21:13

## 2020-06-16 RX ADMIN — AMLODIPINE BESYLATE 5 MG: 5 TABLET ORAL at 08:32

## 2020-06-16 NOTE — PROGRESS NOTES
Problem: Diabetes Self-Management  Goal: *Disease process and treatment process  Description: Define diabetes and identify own type of diabetes; list 3 options for treating diabetes. Outcome: Progressing Towards Goal     Problem: Depressed Mood (Adult/Pediatric)  Goal: *STG: Participates in treatment plan  Outcome: Progressing Towards Goal  Note: Pt. Out on unit    social with peers  states less depressed  Wanting discharge   Problem: Depressed Mood (Adult/Pediatric)  Goal: *STG: Verbalizes anger, guilt, and other feelings in a constructive manor  Outcome: Progressing Towards Goal     Problem: Depressed Mood (Adult/Pediatric)  Goal: *STG: Remains safe in hospital  Outcome: Progressing Towards Goal  Note: Denies suicidal ideation      Problem: Depressed Mood (Adult/Pediatric)  Goal: *STG: Complies with medication therapy  Outcome: Progressing Towards Goal  Note: Medication compliant  reviewed in treatment team      Problem: Depressed Mood (Adult/Pediatric)  Goal: Interventions  Outcome: Progressing Towards Goal  Note: Will continue to monitor on 15 min.  Checks for safety  assess depression  medication compliance  effectiveness  encourage unit participation

## 2020-06-16 NOTE — PROGRESS NOTES
Laboratory Monitoring for Antipsychotics: This patient is currently prescribed the following medication(s):   Current Facility-Administered Medications   Medication Dose Route Frequency    traZODone (DESYREL) tablet 100 mg  100 mg Oral QHS    lurasidone (LATUDA) tablet 80 mg  80 mg Oral DAILY WITH DINNER    sertraline (ZOLOFT) tablet 100 mg  100 mg Oral DAILY    gabapentin (NEURONTIN) capsule 300 mg  300 mg Oral TID    nicotine (NICODERM CQ) 14 mg/24 hr patch 1 Patch  1 Patch TransDERmal DAILY    amLODIPine (NORVASC) tablet 5 mg  5 mg Oral DAILY    atorvastatin (LIPITOR) tablet 10 mg  10 mg Oral QHS    furosemide (LASIX) tablet 40 mg  40 mg Oral DAILY    pantoprazole (PROTONIX) tablet 40 mg  40 mg Oral ACB    folic acid (FOLVITE) tablet 1 mg  1 mg Oral DAILY    thiamine HCL (B-1) tablet 100 mg  100 mg Oral DAILY    insulin lispro (HUMALOG) injection   SubCUTAneous AC&HS     The following labs have been completed for monitoring of antipsychotics and/or mood stabilizers:    Height, Weight, BMI Estimation  Estimated body mass index is 28.13 kg/m² as calculated from the following:    Height as of this encounter: 175.3 cm (69\"). Weight as of this encounter: 86.4 kg (190 lb 8 oz). Vital Signs/Blood Pressure  Visit Vitals  BP (!) 150/96 (BP 1 Location: Left arm, BP Patient Position: Sitting)   Pulse 90   Temp 98.8 °F (37.1 °C)   Resp 16   Ht 175.3 cm (69\")   Wt 86.4 kg (190 lb 8 oz)   SpO2 97%   BMI 28.13 kg/m²     Renal Function, Hepatic Function and Chemistry  Estimated Creatinine Clearance: 52.5 mL/min (A) (by C-G formula based on SCr of 1.67 mg/dL (H)).     Lab Results   Component Value Date/Time    Sodium 139 06/13/2020 10:48 AM    Potassium 4.1 06/13/2020 10:48 AM    Chloride 106 06/13/2020 10:48 AM    CO2 30 06/13/2020 10:48 AM    Anion gap 3 (L) 06/13/2020 10:48 AM    BUN 24 (H) 06/13/2020 10:48 AM    Creatinine 1.67 (H) 06/13/2020 10:48 AM    BUN/Creatinine ratio 14 06/13/2020 10:48 AM    Bilirubin, total 0.6 06/13/2020 10:48 AM    Protein, total 7.2 06/13/2020 10:48 AM    Albumin 3.5 06/13/2020 10:48 AM    Globulin 3.7 06/13/2020 10:48 AM    A-G Ratio 0.9 (L) 06/13/2020 10:48 AM    ALT (SGPT) 30 06/13/2020 10:48 AM    AST (SGOT) 17 06/13/2020 10:48 AM    Alk. phosphatase 48 06/13/2020 10:48 AM     Lab Results   Component Value Date/Time    Glucose 118 (H) 06/13/2020 10:48 AM    Glucose (POC) 134 (H) 06/16/2020 07:23 AM     Lab Results   Component Value Date/Time    Hemoglobin A1c 6.7 (H) 06/12/2020 03:53 PM     Hematology  Lab Results   Component Value Date/Time    WBC 4.9 06/13/2020 12:43 AM    RBC 4.12 06/13/2020 12:43 AM    HGB 12.5 06/13/2020 12:43 AM    HCT 36.4 (L) 06/13/2020 12:43 AM    MCV 88.3 06/13/2020 12:43 AM    MCH 30.3 06/13/2020 12:43 AM    MCHC 34.3 06/13/2020 12:43 AM    RDW 13.5 06/13/2020 12:43 AM    PLATELET 055 79/33/4722 12:43 AM     Lipids  Lab Results   Component Value Date/Time    Cholesterol, total 169 06/16/2020 04:01 AM    HDL Cholesterol 42 06/16/2020 04:01 AM    LDL, calculated 96.2 06/16/2020 04:01 AM    Triglyceride 154 (H) 06/16/2020 04:01 AM    CHOL/HDL Ratio 4.0 06/16/2020 04:01 AM     Thyroid Function  Lab Results   Component Value Date/Time    TSH 0.07 (L) 06/12/2020 03:53 PM     Assessment/Plan:  Recommended baseline laboratory monitoring has been completed based on this patient's current medication regimen. Recommend increasing atorvastatin to 40mg/day. Follow-up metabolic monitoring labs should be completed in 3 months (quarterly for the first year of antipsychotic therapy).      Bhargav Farfan, LUNAD

## 2020-06-16 NOTE — DIABETES MGMT
SAN[facility]EP [facility]  CLINICAL NURSE SPECIALIST CONSULT  PROGRAM FOR DIABETES HEALTH    INITIAL NOTE    Presentation   Delmis Barroso is a 63 y.o. male with a PMH of diabetes, Hepatits C, HTN, depression, tobacco use and alcohol abuse who presented to the ED after an attempted suicide attempt with a drug overdose. He was admitted to step-down telemetry for lactic acidosis with RILEY, hypothermia and CIWA protocol. Once medically stabilized, he was transferred to inpatient behavioral health for management of mood disorder. Recent Events:  Having trouble sleeping; BG within target today; requiring minimal amount of correctional insulin    Diabetes: Patient has a six year history of type two diabetes, treated with metformin PTA. Admitting A1C 6.7%    Consulted by Provider for advanced diabetes nursing assessment and care, specifically related to     [x] Home management assessment      Diabetes-related medical history  Acute complications: RILEY  Neurological complications  Peripheral neuropathy  Microvascular disease: None  Macrovascular disease: None  Other associated conditions     Depression ; Hypothyroidism    Diabetes medication history  Drug class Currently in use Discontinued Never used   Biguanide Metformin 1000 mg BID     DDP-4 inhibitor       Sulfonylurea      Thiazolidinedione      GLP-1 RA      SGLT-2 inhibitors      Basal insulin      Fixed Dose  Combinations  70/30 Insulin    Bolus insulin        Subjective   I have had diabetes for about 6 years. I was out of metformin for a month or so but just got a refill the day I came into the hospital.  I also just got a glucometer but need strips. I can't remember what kind I use.     Off medications x1 month- history of inconsistent metformin use  Alcohol abuse  He lost his home here in [place] and was forced to move in with his brother in [place].    Does not monitor blood glucose    Social determinants of health impacting diabetes self-management practices Missing health appointments or obtaining medications due to lack of reliable transportation and Struggling with anxiety and/or depression    Objective   Physical exam  General Alert, oriented and in no acute distress/ill-appearing. Conversant and cooperative. Vital Signs   Visit Vitals  BP (!) 131/92   Pulse 97   Temp 98.5 °F (36.9 °C)   Resp 16   Ht 5' 9\" (1.753 m)   Wt 86.4 kg (190 lb 8 oz)   SpO2 97%   BMI 28.13 kg/m²     Skin  Warm and dry. No canthosis noted along neckline. Heart   Regular rate and rhythm. No murmurs, rubs or gallops  Lungs  Clear to auscultation without rales or rhonchi  Extremities No foot wounds    Diabetic foot exam:    Left Foot     Visual Exam: normal    Pulse DP: 2+ (normal)   Filament test: reduced sensation    Vibratory sensation: diminished  Right Foot   Visual Exam: callous - Skin graft on top of foot. Grafted 1 year ago from a pot of boiling water falling    Pulse DP: 2+ (normal)   Filament test: reduced sensation    Vibratory sensation: diminished DP & PT pulses +2. Laboratory  Lab Results   Component Value Date/Time    Hemoglobin A1c 6.7 (H) 06/12/2020 03:53 PM     Lab Results   Component Value Date/Time    LDL, calculated 96.2 06/16/2020 04:01 AM     Lab Results   Component Value Date/Time    Creatinine 1.67 (H) 06/13/2020 10:48 AM     Lab Results   Component Value Date/Time    Sodium 139 06/13/2020 10:48 AM    Potassium 4.1 06/13/2020 10:48 AM    Chloride 106 06/13/2020 10:48 AM    CO2 30 06/13/2020 10:48 AM    Anion gap 3 (L) 06/13/2020 10:48 AM    Glucose 118 (H) 06/13/2020 10:48 AM    BUN 24 (H) 06/13/2020 10:48 AM    Creatinine 1.67 (H) 06/13/2020 10:48 AM    BUN/Creatinine ratio 14 06/13/2020 10:48 AM    GFR est AA 51 (L) 06/13/2020 10:48 AM    GFR est non-AA 42 (L) 06/13/2020 10:48 AM    Calcium 8.0 (L) 06/13/2020 10:48 AM    Bilirubin, total 0.6 06/13/2020 10:48 AM    Alk.  phosphatase 48 06/13/2020 10:48 AM    Protein, total 7.2 06/13/2020 10:48 AM    Albumin 3.5 06/13/2020 10:48 AM    Globulin 3.7 06/13/2020 10:48 AM    A-G Ratio 0.9 (L) 06/13/2020 10:48 AM    ALT (SGPT) 30 06/13/2020 10:48 AM     Lab Results   Component Value Date/Time    ALT (SGPT) 30 06/13/2020 10:48 AM       Blood glucose pattern          Assessment and Plan   Nursing Diagnosis Risk for unstable blood glucose pattern   Nursing Intervention Domain 5719 Decision-making Support   Nursing Interventions Examined current inpatient diabetes control   Explored factors facilitating and impeding inpatient management  Identified self-management practices impeding diabetes control  Explored corrective strategies with patient and responsible inpatient provider   Informed patient of rational for insulin strategy while hospitalized  Instructed patient in A1C, inpatient blood sugar goals, metformin and RILEY, resuming medication on discharge     Evaluation   Dominique Garcia is a 63 y.o. male with a PMH of diabetes, Hepatits C, HTN, depression, tobacco use and alcohol abuse who presented to the ED after an attempted suicide attempt with a drug overdose. He was admitted to step-down telemetry for lactic acidosis with RILEY, hypothermia and CIWA protocol. Once medically stabilized, he was transferred to inpatient behavioral health for management of mood disorder. His metformin has been held since admission due to RILEY and blood glucose has been in goal without any antihyperglycemic agents, including insulin. It would be worth observing his glucose until appetite improved- at this time having 25-50% of mealtime consumption. RILEY nearly resolved on labs two days ago and there is low suspicion of hyperglycemia given his low A1C of 6.7%. Recommendations   Recommend:  1.  Continue ACHS POC glucose with correctional insulin, please adjust scale to start at a blood glucose of 200.    2. If patient's blood glucose remains in goal of 100-180 with consistent oral intake (meal consumption over 50%) - can discontinue correctional insulin and follow glucose on routine labs. On Discharge:  Patient will need a FUV with PCP to discuss re-starting metformin  Patient will need to perform a fasting blood glucose daily upon discharge. To create a log of values and present to PCP for interpretation. Billing Code(s)     Thank you for including us in their care. I spent 15 minutes in direct patient care today for this patient.   Time includes chart review, face to face with patient and collaboration with interdisciplinary care team.       Delfina Schumacher, CNS  Program for Diabetes Health  Access via [address] [address] [phone]

## 2020-06-16 NOTE — DISCHARGE INSTRUCTIONS
DISCHARGE SUMMARY    NAME:Go Zhou  : 1960  MRN: [id number]    The patient Gail Mahmood exhibits the ability to control behavior in a less restrictive environment. Patient's level of functioning is improving. No assaultive/destructive behavior has been observed for the past 24 hours. No suicidal/homicidal threat or behavior has been observed for the past 24 hours. There is no evidence of serious medication side effects. Patient has not been in physical or protective restraints for at least the past 24 hours. If weapons involved, how are they secured? No weapons involved     Is patient aware of and in agreement with discharge plan? Arrangements for medication:  Prescriptions filled at [facility] of discharge instructions to provider?:  Yes, fax to [phone]     Arrangements for transportation home:  Lyft (patient does not have transportation benefits through insurance. Keep all follow up appointments as scheduled, continue to take prescribed medications per physician instructions. Mental health crisis number:  [phone] or your local mental health crisis line number at [address]: [phone]             DISCHARGE SUMMARY from Nurse    PATIENT INSTRUCTIONS: prescriptions filled and given to pt. What to do at Home:  Recommended activity: Activity as tolerated,     *  Please give a list of your current medications to your Primary Care Provider. *  Please update this list whenever your medications are discontinued, doses are      changed, or new medications (including over-the-counter products) are added. *  Please carry medication information at all times in case of emergency situations. These are general instructions for a healthy lifestyle:    No smoking/ No tobacco products/ Avoid exposure to second hand smoke  Surgeon General's Warning:  Quitting smoking now greatly reduces serious risk to your health.     Obesity, smoking, and sedentary lifestyle greatly increases your risk for illness    A healthy diet, regular physical exercise & weight monitoring are important for maintaining a healthy lifestyle    You may be retaining fluid if you have a history of heart failure or if you experience any of the following symptoms:  Weight gain of 3 pounds or more overnight or 5 pounds in a week, increased swelling in our hands or feet or shortness of breath while lying flat in bed. Please call your doctor as soon as you notice any of these symptoms; do not wait until your next office visit. The discharge information has been reviewed with the patient. The patient verbalized understanding. Discharge medications reviewed with the patient and appropriate educational materials and side effects teaching were provided. ____________________________________________________________________________________________________________________________      Hospital Medicine Discharge Instructions: We discussed monitoring your kidney function daily while inpatient and resuming your home medications as your values return to normal. You felt you would prefer to follow up with your primary care provider and discuss resuming medications at that time. Please continue to hold your metformin and valsartan until instructed otherwise by your PCP. Continue Norvasc for blood pressure and sliding scale insulin until then. Watch for signs of decreased urine output or retaining urine. Return to the emergency department for any concerning symptoms. [email or web address] Activation    Thank you for requesting access to [email or web address]. Please follow the instructions below to securely access and download your online medical record. [email or web address] allows you to send messages to your doctor, view your test results, renew your prescriptions, schedule appointments, and more. How Do I Sign Up? 1. In your internet browser, go to [email or web address]  2. Click on the First Time User? Click Here link in the Sign In box. You will be redirect to the New Member Sign Up page. 3. Enter your [employer] Access Code exactly as it appears below. You will not need to use this code after youve completed the sign-up process. If you do not sign up before the expiration date, you must request a new code. [employer] Access Code: [email or web address]  Expires: 2020  5:04 PM (This is the date your [employer] access code will )    4. Enter the last four digits of your Social Security Number (xxxx) and Date of Birth (mm/dd/yyyy) as indicated and click Submit. You will be taken to the next sign-up page. 5. Create a Silent Circlet ID. This will be your [employer] login ID and cannot be changed, so think of one that is secure and easy to remember. 6. Create a [employer] password. You can change your password at any time. 7. Enter your Password Reset Question and Answer. This can be used at a later time if you forget your password. 8. Enter your e-mail address. You will receive e-mail notification when new information is available in [address] 9. Click Sign Up. You can now view and download portions of your medical record. 10. Click the Download Summary menu link to download a portable copy of your medical information. Additional Information    If you have questions, please visit the Frequently Asked Questions section of the [employer] website at [email or web address] Remember, [employer] is NOT to be used for urgent needs. For medical emergencies, dial 911.

## 2020-06-16 NOTE — PROGRESS NOTES
[facility] brought up patient's medications (3 large bags) they are currently being stored in the locked cabinet with the cell phones. Problem: Depressed Mood (Adult/Pediatric)  Goal: *STG: Remains safe in hospital  Outcome: Progressing Towards Goal    Pt isolating in room this evening. He did come out and eat dinner at the table and went back to room immediately afterwards.

## 2020-06-17 VITALS
OXYGEN SATURATION: 98 % | HEIGHT: 69 IN | TEMPERATURE: 98.5 F | WEIGHT: 190.5 LBS | HEART RATE: 99 BPM | BODY MASS INDEX: 28.22 KG/M2 | DIASTOLIC BLOOD PRESSURE: 94 MMHG | SYSTOLIC BLOOD PRESSURE: 132 MMHG | RESPIRATION RATE: 16 BRPM

## 2020-06-17 LAB
ANION GAP SERPL CALC-SCNC: 8 MMOL/L (ref 5–15)
BUN SERPL-MCNC: 31 MG/DL (ref 6–20)
BUN/CREAT SERPL: 21 (ref 12–20)
CALCIUM SERPL-MCNC: 9.1 MG/DL (ref 8.5–10.1)
CHLORIDE SERPL-SCNC: 100 MMOL/L (ref 97–108)
CO2 SERPL-SCNC: 28 MMOL/L (ref 21–32)
CREAT SERPL-MCNC: 1.49 MG/DL (ref 0.7–1.3)
GLUCOSE BLD STRIP.AUTO-MCNC: 114 MG/DL (ref 65–100)
GLUCOSE BLD STRIP.AUTO-MCNC: 125 MG/DL (ref 65–100)
GLUCOSE SERPL-MCNC: 125 MG/DL (ref 65–100)
POTASSIUM SERPL-SCNC: 3.8 MMOL/L (ref 3.5–5.1)
SERVICE CMNT-IMP: ABNORMAL
SERVICE CMNT-IMP: ABNORMAL
SODIUM SERPL-SCNC: 136 MMOL/L (ref 136–145)

## 2020-06-17 PROCEDURE — 36415 COLL VENOUS BLD VENIPUNCTURE: CPT

## 2020-06-17 PROCEDURE — 74011250637 HC RX REV CODE- 250/637: Performed by: PSYCHIATRY & NEUROLOGY

## 2020-06-17 PROCEDURE — 82962 GLUCOSE BLOOD TEST: CPT

## 2020-06-17 PROCEDURE — 74011250637 HC RX REV CODE- 250/637: Performed by: NURSE PRACTITIONER

## 2020-06-17 PROCEDURE — 80048 BASIC METABOLIC PNL TOTAL CA: CPT

## 2020-06-17 RX ORDER — FUROSEMIDE 40 MG/1
TABLET ORAL
Qty: 30 TAB | Refills: 0 | Status: SHIPPED | OUTPATIENT
Start: 2020-06-18 | End: 2020-06-17 | Stop reason: SDUPTHER

## 2020-06-17 RX ORDER — HYDROXYZINE 50 MG/1
50 TABLET, FILM COATED ORAL
Qty: 90 TAB | Refills: 0 | Status: SHIPPED | OUTPATIENT
Start: 2020-06-17 | End: 2020-06-27

## 2020-06-17 RX ORDER — GABAPENTIN 400 MG/1
400 CAPSULE ORAL 3 TIMES DAILY
Qty: 90 CAP | Refills: 0 | Status: SHIPPED | OUTPATIENT
Start: 2020-06-17 | End: 2020-06-29 | Stop reason: SDUPTHER

## 2020-06-17 RX ORDER — FUROSEMIDE 40 MG/1
40 TABLET ORAL DAILY
Qty: 30 TAB | Refills: 0 | Status: SHIPPED | OUTPATIENT
Start: 2020-06-17 | End: 2020-06-29 | Stop reason: SDUPTHER

## 2020-06-17 RX ORDER — SERTRALINE HYDROCHLORIDE 100 MG/1
100 TABLET, FILM COATED ORAL DAILY
Qty: 30 TAB | Refills: 0 | Status: SHIPPED | OUTPATIENT
Start: 2020-06-18 | End: 2020-06-29 | Stop reason: SDUPTHER

## 2020-06-17 RX ORDER — TRAZODONE HYDROCHLORIDE 100 MG/1
100 TABLET ORAL
Qty: 30 TAB | Refills: 0 | Status: ON HOLD | OUTPATIENT
Start: 2020-06-17 | End: 2020-08-10

## 2020-06-17 RX ADMIN — PANTOPRAZOLE SODIUM 40 MG: 40 TABLET, DELAYED RELEASE ORAL at 06:34

## 2020-06-17 RX ADMIN — FUROSEMIDE 40 MG: 40 TABLET ORAL at 08:23

## 2020-06-17 RX ADMIN — FOLIC ACID 1 MG: 1 TABLET ORAL at 08:23

## 2020-06-17 RX ADMIN — GABAPENTIN 300 MG: 300 CAPSULE ORAL at 08:23

## 2020-06-17 RX ADMIN — SERTRALINE HYDROCHLORIDE 100 MG: 50 TABLET ORAL at 08:23

## 2020-06-17 RX ADMIN — Medication 100 MG: at 08:23

## 2020-06-17 RX ADMIN — AMLODIPINE BESYLATE 5 MG: 5 TABLET ORAL at 08:23

## 2020-06-17 NOTE — PROGRESS NOTES
Pt receiving continuous q15m safety checks, pt currently asleep resting comfortably in bed. No distress noted, respiratory WNL. Supplemental lighting utilized. Problem: Depressed Mood (Adult/Pediatric)  Goal: *STG: Remains safe in hospital  Outcome: Progressing Towards Goal     Problem: Falls - Risk of  Goal: *Absence of Falls  Description: Document Cortez Fall Risk and appropriate interventions in the flowsheet.   Outcome: Progressing Towards Goal  Note: Fall Risk Interventions:            Medication Interventions: Teach patient to arise slowly         History of Falls Interventions: Room close to nurse's station

## 2020-06-17 NOTE — INTERDISCIPLINARY ROUNDS
Behavioral Health Interdisciplinary Rounds Patient Name: Clive Melendrez  Age: 63 y.o. Room/Bed:  728/ Primary Diagnosis: <principal problem not specified> Admission Status: Voluntary Readmission within 30 days: no 
Power of  in place: no 
Patient requires a blocked bed:           Reason for blocked bed: VTE Prophylaxis: Not indicated Mobility needs/Fall risk: no 
Flu Vaccine : no  
Nutritional Plan: yes Consults:         
Labs/Testing due today?: yes Sleep hours:  6.5 Participation in Care/Groups:  yes Medication Compliant?: Yes PRNS (last 24 hours): None Restraints (last 24 hours):  no 
  
CIWA (range last 24 hours): CIWA-Ar Total: 0  
COWS (range last 24 hours): Alcohol screening (AUDIT) completed -   AUDIT Score: 0 If applicable, date SBIRT discussed in treatment team AND documented:  
AUDIT Screen Score: AUDIT Score: 0 Tobacco - patient is a smoker: Have You Used Tobacco in the Past 30 Days: Yes Illegal Drugs use: Have You Used Any Illegal Substances Over the Past 12 Months: No 
 
24 hour chart check complete: yes Patient goal(s) for today: prepare for discharge Treatment team focus/goals: reconcile medications and facilitate discharge Progress note: Pt is alert, oriented, clam, cooperative and psychiatrically stable for discharge. LOS:  4  Expected LOS: 4 Financial concerns/prescription coverage:  [employer] Family contact: brother Family requesting physician contact today:  no 
Discharge plan:he will return home with his brother Access to weapons :  no Outpatient provider(s): follow up [facility] Patient's preferred phone number for follow up call : [phone] Participating treatment team members: Dr. Cristal Henderson, PharmD.  - John Haney, RN - Kinga Underwood, MSW

## 2020-06-17 NOTE — PROGRESS NOTES
Pharmacist Discharge Medication Reconciliation    Discharging Provider: Dr. Markel Candelario PMH:   Past Medical History:   Diagnosis Date    Chronic pain     Diabetes ([address]     Hepatitis C     Hypertension      Chief Complaint for this Admission: No chief complaint on file. Allergies: Tramadol and Lisinopril    Discharge Medications:   Current Discharge Medication List        START taking these medications    Details   traZODone (DESYREL) 100 mg tablet Take 1 Tab by mouth nightly. Indications: insomnia associated with depression  Qty: 30 Tab, Refills: 0      hydrOXYzine HCL (ATARAX) 50 mg tablet Take 1 Tab by mouth three (3) times daily as needed for Anxiety for up to 10 days. Indications: anxious  Qty: 90 Tab, Refills: 0      gabapentin (NEURONTIN) 400 mg capsule Take 1 Cap by mouth three (3) times daily. Max Daily Amount: 1,200 mg. Indications: neuropathic pain  Qty: 90 Cap, Refills: 0    Associated Diagnoses: Numbness and tingling of right arm      furosemide (LASIX) 40 mg tablet Take 1 Tab by mouth daily. .  Indications: visible water retention, high blood pressure  Qty: 30 Tab, Refills: 0           CONTINUE these medications which have CHANGED    Details   sertraline (ZOLOFT) 100 mg tablet Take 1 Tab by mouth daily. Indications: major depressive disorder  Qty: 30 Tab, Refills: 0      lurasidone (LATUDA) 80 mg tab tablet Take 1 Tab by mouth daily (with dinner). Indications: bipolar depression  Qty: 30 Tab, Refills: 0           CONTINUE these medications which have NOT CHANGED    Details   valsartan (DIOVAN) 160 mg tablet Take 160 mg by mouth daily. atorvastatin (LIPITOR) 10 mg tablet Take 1 Tab by mouth daily. Indications: prevent stroke  Qty: 90 Tab, Refills: 3    Associated Diagnoses: Mixed hyperlipidemia      amLODIPine (NORVASC) 5 mg tablet Take 1 Tab by mouth daily.  Indications: pressure  Qty: 90 Tab, Refills: 3    Associated Diagnoses: Essential hypertension      diclofenac (VOLTAREN) 1 % gel AAA in thin coat BID as needed for painful joints  Qty: 100 g, Refills: 5    Associated Diagnoses: Arthritis      aspirin delayed-release 81 mg tablet Take 1 Tab by mouth daily.  Indications: prevent heart problem  Qty: 100 Tab, Refills: 1    Comments: snf pt  Associated Diagnoses: Essential hypertension           STOP taking these medications       metFORMIN (GLUCOPHAGE) 1,000 mg tablet Comments:   Reason for Stopping:         gabapentin (NEURONTIN) 800 mg tablet Comments:   Reason for Stopping:         nicotine (NICODERM CQ) 14 mg/24 hr patch Comments:   Reason for Stopping:         metFORMIN (GLUCOPHAGE) 1,000 mg tablet Comments:   Reason for Stopping:         gabapentin (NEURONTIN) 800 mg tablet Comments:   Reason for Stopping:         folic acid (FOLVITE) 1 mg tablet Comments:   Reason for Stopping:         thiamine HCL (B-1) 100 mg tablet Comments:   Reason for Stopping:             The patient's chart, MAR and AVS were reviewed by Mariela Reyna, PHARMD.

## 2020-06-17 NOTE — PROGRESS NOTES
Problem: Diabetes Self-Management  Goal: *Disease process and treatment process  Description: Define diabetes and identify own type of diabetes; list 3 options for treating diabetes. Outcome: Progressing Towards Goal     Problem: Depressed Mood (Adult/Pediatric)  Goal: *STG: Participates in treatment plan  Outcome: Progressing Towards Goal  Note: Pt. Out on the unit for meals  social with peers  discussing his discharge today      Problem: Depressed Mood (Adult/Pediatric)  Goal: *STG: Attends activities and groups  Outcome: Progressing Towards Goal     Problem: Depressed Mood (Adult/Pediatric)  Goal: *STG: Complies with medication therapy  Outcome: Progressing Towards Goal  Note: Medication compliant  reviewed in treatment team     Problem: Depressed Mood (Adult/Pediatric)  Goal: Interventions  Outcome: Progressing Towards Goal  Note: Will continue to monitor  on 15 min.  Checks for safety  assess depression   medication compliance  effectiveness  encourage unit participation

## 2020-06-17 NOTE — DISCHARGE SUMMARY
DISCHARGE SUMMARY    Some parts of the discharge summary are from the initial Psychiatric interview that was done on admission by the admitting psychiatrist.      Date of Admission: 6/13/2020    Date of Discharge:6/17/2020     TYPE OF DISCHARGE:   REGULAR -  YES    AMA  RELEASED BY THE [facility] COURT     CHIEF COMPLAINT:  \"I overdosed. \"     HISTORY OF PRESENTING ILLNESS:  The patient is a 60-year-old male, who is currently admitted at [address] [place] [address] on a voluntary basis. He was at [address] [place] [address] for a few days and was cleared for medical transfer here at [facility]. He presented to the emergency room after an intentional overdose of 8-10 tablets of ibuprofen 800 mg, 8 tablets of metformin 500 mg, and 1 or 2 tablets of amlodipine. His urine drug screen was positive for barbiturates and blood alcohol level on admission was 200. He states that he has been off his medications for a whole month. He has been having a hard time getting his medications due to Coronavirus. He states that he started feeling depressed, hopeless, and helpless since being off his medications. He is supposed to be taking Zoloft and [place]. He has a significant history of alcohol dependence. He drank two quarts of alcohol last Saturday, but denies it a problem. He has history of overdosing about three times. His last suicide attempt was in 2017. He has a pending court date in July due to probation violation from an absconded discharge. He states that he left [address] [facility] and went to [facility] without notifying to his . He also has been overwhelmed with his health that has been declining. He lost his home here in [address] [facility] and was forced to move in with his brother in [place].   He continues to endorse passive thoughts of hurting himself, but denies homicidal ideation, auditory or visual hallucination.     PAST MEDICAL HISTORY:  See H and P.     Labs: (reviewed/updated 6/15/2020)  Patient Marc Tsang for the past 8 hrs:    BP Temp Pulse Resp SpO2   06/15/20 0747 118/82 98.1 °F (36.7 °C) 90 16 97 %            Labs Reviewed   GLUCOSE, POC - Abnormal; Notable for the following components:       Result Value      Glucose (POC) 119 (*)       All other components within normal limits   GLUCOSE, POC - Abnormal; Notable for the following components:     Glucose (POC) 112 (*)       All other components within normal limits   GLUCOSE, POC - Abnormal; Notable for the following components:     Glucose (POC) 103 (*)       All other components within normal limits   GLUCOSE, POC - Abnormal; Notable for the following components:     Glucose (POC) 178 (*)       All other components within normal limits   GLUCOSE, POC - Abnormal; Notable for the following components:     Glucose (POC) 142 (*)       All other components within normal limits            Lab Results   Component Value Date/Time     Sodium 139 06/13/2020 10:48 AM     Potassium 4.1 06/13/2020 10:48 AM     Chloride 106 06/13/2020 10:48 AM     CO2 30 06/13/2020 10:48 AM     Anion gap 3 (L) 06/13/2020 10:48 AM     Glucose 118 (H) 06/13/2020 10:48 AM     BUN 24 (H) 06/13/2020 10:48 AM     Creatinine 1.67 (H) 06/13/2020 10:48 AM     BUN/Creatinine ratio 14 06/13/2020 10:48 AM     GFR est AA 51 (L) 06/13/2020 10:48 AM     GFR est non-AA 42 (L) 06/13/2020 10:48 AM     Calcium 8.0 (L) 06/13/2020 10:48 AM     Bilirubin, total 0.6 06/13/2020 10:48 AM     Alk.  phosphatase 48 06/13/2020 10:48 AM     Protein, total 7.2 06/13/2020 10:48 AM     Albumin 3.5 06/13/2020 10:48 AM     Globulin 3.7 06/13/2020 10:48 AM     A-G Ratio 0.9 (L) 06/13/2020 10:48 AM     ALT (SGPT) 30 06/13/2020 10:48 AM              Admission on 06/13/2020   Component Date Value Ref Range Status    Glucose (POC) 06/14/2020 119* 65 - 100 mg/dL Final    Performed by 06/14/2020 Sudha Qureshi    Final    Glucose (POC) 06/14/2020 112* 65 - 100 mg/dL Final    Performed by 06/14/2020 Sudha Anders    Glucose (POC) 06/14/2020 103* 65 - 100 mg/dL Final    Performed by 06/14/2020 HITESH DUPONT (CON)    Final    Glucose (POC) 06/14/2020 178* 65 - 100 mg/dL Final    Performed by 06/14/2020 HITESH DUPONT (CON)    Final    Glucose (POC) 06/15/2020 142* 65 - 100 mg/dL Final    Performed by 06/15/2020 Manuel Bowie    Final             Vitals:     06/14/20 1029 06/14/20 1517 06/14/20 2034 06/15/20 0747   BP: 111/75 131/83 101/67 118/82   Pulse: 70 83 86 90   Resp: 16 16 16 16   Temp: 98 °F (36.7 °C) 98.1 °F (36.7 °C) 98.1 °F (36.7 °C) 98.1 °F (36.7 °C)   SpO2: 98% 97% 98% 97%   Weight:           Height:              Recent Results         Recent Results (from the past 24 hour(s))   GLUCOSE, POC     Collection Time: 06/14/20 11:18 AM   Result Value Ref Range     Glucose (POC) 112 (H) 65 - 100 mg/dL     Performed by Jeronimo Conte     GLUCOSE, POC     Collection Time: 06/14/20  4:04 PM   Result Value Ref Range     Glucose (POC) 103 (H) 65 - 100 mg/dL     Performed by Rosie DUPONT (CON)     GLUCOSE, POC     Collection Time: 06/14/20  8:15 PM   Result Value Ref Range     Glucose (POC) 178 (H) 65 - 100 mg/dL     Performed by Rosie DUPONT (CON)     GLUCOSE, POC     Collection Time: 06/15/20  7:24 AM   Result Value Ref Range     Glucose (POC) 142 (H) 65 - 100 mg/dL     Performed by Manuel Bowie              RADIOLOGY REPORTS:       Results from Hospital Encounter encounter on 06/10/20   XR CHEST PORT     Narrative EXAM: XR CHEST PORT     INDICATION: overdose.     COMPARISON: 2015     FINDINGS: A portable AP radiograph of the chest was obtained at 0626 hours. The  patient is on a cardiac monitor. The lungs are clear. There is atherosclerosis  of the aorta. The bones and soft tissues are grossly within normal limits.         Impression IMPRESSION: No acute findings. Xr Spine Lumb Min 4 V     Result Date: 5/21/2020  EXAM:  Lumbar spine INDICATION:  2/9/2019 COMPARISON: None.  FINDINGS: AP, lateral, bilateral oblique and spot lateral views of the lumbar spine demonstrate normal alignment. The vertebral body heights and disc spaces are well-preserved. There is no spondylolysis or spondylolisthesis. There is no fracture, subluxation or other abnormality. Facet arthropathy is noted at the lower 2 levels, asymmetric to the left. Spondylitic changes most pronounced at L1-2.      IMPRESSION:  Lower lumbar facet arthropathy. No acute process identified      Xr Knee Lt Min 4 V     Result Date: 5/21/2020  EXAM: XR KNEE LT MIN 4 V INDICATION: knee pain. COMPARISON: None. FINDINGS: Four views of the left knee demonstrate no fracture or other acute osseous or articular abnormality. There is a small joint effusion.      IMPRESSION: Small left knee effusion     Xr Knee Rt Min 4 V     Result Date: 5/21/2020  EXAM: XR KNEE RT MIN 4 V INDICATION: Right knee pain. COMPARISON: None. FINDINGS: Four views of the right knee demonstrate no fracture or other acute osseous or articular abnormality. There is no effusion. There is medial joint space narrowing with osteophytosis.      IMPRESSION: No acute abnormality. Mild medial compartment osteoarthritis.     Xr Ankle Lt Min 3 V     Result Date: 5/30/2020  EXAM: XR ANKLE LT MIN 3 V INDICATION: Left ankle pain since fall and injury one day ago. COMPARISON: None. FINDINGS: Three views of the left ankle demonstrate no fracture or disruption of the ankle mortise. There is no other acute osseous or articular abnormality. The soft tissues are within normal limits. Joints are within normal limits.      IMPRESSION: Normal left ankle views.     Xr Foot Lt Min 3 V     Result Date: 5/30/2020  EXAM: XR FOOT LT MIN 3 V INDICATION: Left foot pain since injury one day ago. COMPARISON: None. FINDINGS: Three views of the left foot demonstrate no fracture or other acute osseous or articular abnormality. The soft tissues are within normal limits. Mild first MTP joint osteoarthritis.    IMPRESSION: No acute abnormality.     Us Retroperitoneum Comp     Result Date: 6/12/2020  RENAL ULTRASOUND INDICATION: Acute renal failure COMPARISON: None. TECHNIQUE: Sonography of the kidneys, retroperitoneum, and bladder was performed. FINDINGS: RIGHT KIDNEY: 12.6 cm in length. Normal cortical echogenicity. No hydronephrosis, shadowing calculus, or contour-deforming renal mass. LEFT KIDNEY: 11.0 cm in length. Normal cortical echogenicity. No hydronephrosis, shadowing calculus, or contour-deforming renal mass. AORTA: Obscured by overlying bowel gas. COMMON ILIAC ARTERIES: Obscured by overlying bowel gas. IVC: Obscured by overlying bowel gas. BLADDER: Normally distended.      IMPRESSION: Normal renal ultrasound.      Xr Chest Port     Result Date: 6/11/2020  EXAM: XR CHEST PORT INDICATION: overdose. COMPARISON: 2015 FINDINGS: A portable AP radiograph of the chest was obtained at 0626 hours. The patient is on a cardiac monitor. The lungs are clear. There is atherosclerosis of the aorta. The bones and soft tissues are grossly within normal limits.      IMPRESSION: No acute findings.           PAST PSYCHIATRIC HISTORY:  He was admitted at [facility] a year ago due to suicidal ideation. He also has been admitted at [facility] in 2009 and [facility] in 2013. He is currently taking Zoloft and Rylee Curling and it is currently being prescribed by Dr. Calixto Born:  He is single. He has never been . He has 3 children. He is currently receiving social security disability checks. He was in the army for 2-1/2 years. He lives with his brother.     MENTAL STATUS EXAMINATION:  He is alert and oriented in all spheres. He is dressed in hospital apparel. He is calm and pleasant during the interview. He continues to endorse passive thoughts of hurting himself, but denies homicidal ideation, auditory or visual hallucination. Memory seems intact. Intelligence seems average. Insight is partial.  Judgment is poor.     DIAGNOSIS:  Major depressive disorder, recurrent, severe, without psychotic features.     TREATMENT PLANNING:  I will continue his inpatient stay. He will be provided with support and encouraged to attend groups. His safety will be monitored. His medications will be modified and assessed. Case Management will work on discharge planning. Course in the Hospital:     Patient was admitted to the inpatient psychiatry unit for acute psychiatric stabilization in regards to symptomatology as described in the HPI above and placed on Q15 minute checks and withdrawal precautions. While on the unit Bree Edwards was involved in individual, group, occupational and milieu therapy. He was started back on his usual medication regimen as well as PRN medications including Zoloft, Latuda, Gabapentin, Trazodone for sleep, and Vistaril for anxiety. He improved gradually and was able to integrate into the milieu with help from the nursing staff. Patients symptoms improved gradually including low moods, SI, Ah, poor sleep, low energy. He was quite on the unit, appropriate in his interactions, and cooperative with medications and the unit routine. Please see individual progress notes for more specific details regarding patient's hospitalization course. Patient was discharged as per the plan. He had been doing well on the unit as per the report of the nursing staff and my observations. No PRN medication for agitation, seclusion or restraints were required during the last 48 hours of her stay. Bree Edwards had improved progressively to the point of being stable for discharge and outpatient FU. At this time he did not offer any complaints. Patient denied any SI or HI. Denied any AH or VH. He denied any delusions. Was not considered a danger to self or to others and is safe for discharge. Will FU with his appointments and remains motivated to be in treatment.  The patient verbalized understanding of his discharge instructions. DISCHARGE DIAGNOSIS:  Major depressive disorder, recurrent, severe, without psychotic features. MENTAL STATUS EXAM ON DISCHARGE:    General appearance:   Gaurav Fischer is a 63 y.o. BLACK OR  male who is well groomed, psychomotor activity is WNL  Eye contact: makes good eye contact  Speech: Spontaneous and coherent  Affect : Euthymic  Mood: \"OK\"  Thought Process: Logical, goal directed  Perception: Denies any AH or VH. Thought Content: Denies any SI or Plan  Insight: Partial  Judgement: Fair  Cognition: Intact grossly. Current Discharge Medication List      START taking these medications    Details   traZODone (DESYREL) 100 mg tablet Take 1 Tab by mouth nightly. Indications: insomnia associated with depression  Qty: 30 Tab, Refills: 0      hydrOXYzine HCL (ATARAX) 50 mg tablet Take 1 Tab by mouth three (3) times daily as needed for Anxiety for up to 10 days. Indications: anxious  Qty: 90 Tab, Refills: 0      furosemide (LASIX) 40 mg tablet . Indications: visible water retention, high blood pressure  Qty: 30 Tab, Refills: 0      gabapentin (NEURONTIN) 400 mg capsule Take 1 Cap by mouth three (3) times daily. Max Daily Amount: 1,200 mg. Indications: neuropathic pain  Qty: 90 Cap, Refills: 0    Associated Diagnoses: Numbness and tingling of right arm         CONTINUE these medications which have CHANGED    Details   lurasidone (LATUDA) 40 mg tab tablet Take 1 Tab by mouth daily (with dinner). Indications: bipolar depression  Qty: 30 Tab, Refills: 0      sertraline (ZOLOFT) 100 mg tablet Take 1 Tab by mouth daily. Indications: major depressive disorder  Qty: 30 Tab, Refills: 0         CONTINUE these medications which have NOT CHANGED    Details   metFORMIN (GLUCOPHAGE) 1,000 mg tablet Take 1,000 mg by mouth two (2) times daily (with meals). valsartan (DIOVAN) 160 mg tablet Take 160 mg by mouth daily.       atorvastatin (LIPITOR) 10 mg tablet Take 1 Tab by mouth daily. Indications: prevent stroke  Qty: 90 Tab, Refills: 3    Associated Diagnoses: Mixed hyperlipidemia      amLODIPine (NORVASC) 5 mg tablet Take 1 Tab by mouth daily. Indications: pressure  Qty: 90 Tab, Refills: 3    Associated Diagnoses: Essential hypertension      diclofenac (VOLTAREN) 1 % gel AAA in thin coat BID as needed for painful joints  Qty: 100 g, Refills: 5    Associated Diagnoses: Arthritis      aspirin delayed-release 81 mg tablet Take 1 Tab by mouth daily. Indications: prevent heart problem  Qty: 100 Tab, Refills: 1    Comments: FDC pt  Associated Diagnoses: Essential hypertension         STOP taking these medications       gabapentin (NEURONTIN) 800 mg tablet Comments:   Reason for Stopping:         nicotine (NICODERM CQ) 14 mg/24 hr patch Comments:   Reason for Stopping:         gabapentin (NEURONTIN) 800 mg tablet Comments:   Reason for Stopping:         folic acid (FOLVITE) 1 mg tablet Comments:   Reason for Stopping:         thiamine HCL (B-1) 100 mg tablet Comments:   Reason for Stopping: Follow-up Information     Follow up With Specialties Details Why Contact Info    Racquel Chambers MD Family Practice Schedule an appointment as soon as possible for a visit in 1 week Assess kidney function and whether to resumse valsartan and metformin [address] [facility]  [phone]      [facility]   On 6/24/2020 you have a same day access telepsych appointment at 8:30 am with [address], [address]  Phone:  [phone]      Racquel Chambers MD [facility] Schedule an appointment as soon as possible for a visit in 1 week diabetes management - [address] [facility]  [phone]          WOUND CARE: none needed. PROGNOSIS:   Good / Fair based on nature of patient's pathology/ies and treatment compliance issues.   Prognosis is greatly dependent upon patient's ability to  follow up on psychiatric/psychotherapy appointments as well as to comply with psychiatric medications as prescribed.

## 2020-06-17 NOTE — BH NOTES
Behavioral Health Transition Record to Provider    Patient Name: Kizzy Ayon  YOB: 1960  Medical Record Number: [id number]  Date of Admission: 6/13/2020  Date of Discharge: 6/17/2020    Attending Provider: No att. providers found  Discharging Provider: Bao Moon MD  To contact this individual call [phone] and ask the  to page. If unavailable, ask to be transferred to [address] [place] [address] Provider on call. [facility] Provider will be available on call 24/7 and during holidays. Primary Care Provider: Rober Way MD    Allergies   Allergen Reactions    Tramadol Nausea and Vomiting    Lisinopril Cough     Developed cough while taking lisinopril       Reason for Admission: CHIEF COMPLAINT:  \"I overdosed. \"     HISTORY OF PRESENTING ILLNESS:  The patient is a 60-year-old male, who is currently admitted at [address] [place] [address] on a voluntary basis. [facility] was at [address] [place] [address] for a few days and was cleared for medical transfer here at [facility] presented to the emergency room after an intentional overdose of 8-10 tablets of ibuprofen 800 mg, 8 tablets of metformin 500 mg, and 1 or 2 tablets of amlodipine.  His urine drug screen was positive for barbiturates and blood alcohol level on admission was 200. [facility] states that he has been off his medications for a whole month. [facility] has been having a hard time getting his medications due to [facility]  states that he started feeling depressed, hopeless, and helpless since being off his medications. [facility] is supposed to be taking Zoloft and Theramejerome Nino has a significant history of alcohol dependence.  He drank two quarts of alcohol last Saturday, but denies it a problem. [facility] has history of overdosing about three times.  His last suicide attempt was in 2017. He has a pending court date in July due to probation violation from an [address] states that he left [employer] and went to [facility] without notifying to his . Malka Darby also has been overwhelmed with his health that has been declining. Malka Darby lost his home here in [facility] and was forced to move in with his brother in [address] [place] [address] Road continues to endorse passive thoughts of hurting himself, but denies homicidal ideation, auditory or visual hallucination.     Admission Diagnosis: Substance induced mood disorder (HCC) [F19.94]    * No surgery found *    Results for orders placed or performed during the hospital encounter of 06/13/20   SARS-COV-2   Result Value Ref Range    Specimen source Nasopharyngeal      SARS-CoV-2 Not detected NOTD      Specimen source Nasopharyngeal     LIPID PANEL   Result Value Ref Range    LIPID PROFILE          Cholesterol, total 169 <200 MG/DL    Triglyceride 154 (H) <150 MG/DL    HDL Cholesterol 42 MG/DL    LDL, calculated 96.2 0 - 100 MG/DL    VLDL, calculated 30.8 MG/DL    CHOL/HDL Ratio 4.0 0.0 - 5.0     METABOLIC PANEL, BASIC   Result Value Ref Range    Sodium 136 136 - 145 mmol/L    Potassium 3.8 3.5 - 5.1 mmol/L    Chloride 100 97 - 108 mmol/L    CO2 28 21 - 32 mmol/L    Anion gap 8 5 - 15 mmol/L    Glucose 125 (H) 65 - 100 mg/dL    BUN 31 (H) 6 - 20 MG/DL    Creatinine 1.49 (H) 0.70 - 1.30 MG/DL    BUN/Creatinine ratio 21 (H) 12 - 20      GFR est AA 59 (L) >60 ml/min/1.73m2    GFR est non-AA 48 (L) >60 ml/min/1.73m2    Calcium 9.1 8.5 - 10.1 MG/DL   GLUCOSE, POC   Result Value Ref Range    Glucose (POC) 119 (H) 65 - 100 mg/dL    Performed by [employer]    GLUCOSE, POC   Result Value Ref Range    Glucose (POC) 112 (H) 65 - 100 mg/dL    Performed by [employer]    GLUCOSE, POC   Result Value Ref Range    Glucose (POC) 103 (H) 65 - 100 mg/dL    Performed by HITESH DUPONT (CON)    GLUCOSE, POC   Result Value Ref Range    Glucose (POC) 178 (H) 65 - 100 mg/dL    Performed by HITESH DUPONT (CON)    GLUCOSE, POC   Result Value Ref Range    Glucose (POC) 142 (H) 65 - 100 mg/dL    Performed by [employer] Gracia Jalen    GLUCOSE, POC   Result Value Ref Range    Glucose (POC) 112 (H) 65 - 100 mg/dL    Performed by Faustina Ivory    GLUCOSE, POC   Result Value Ref Range    Glucose (POC) 117 (H) 65 - 100 mg/dL    Performed by [facility], POC   Result Value Ref Range    Glucose (POC) 118 (H) 65 - 100 mg/dL    Performed by [address] [place] [address], POC   Result Value Ref Range    Glucose (POC) 134 (H) 65 - 100 mg/dL    Performed by [employer]    GLUCOSE, POC   Result Value Ref Range    Glucose (POC) 148 (H) 65 - 100 mg/dL    Performed by [employer]    GLUCOSE, POC   Result Value Ref Range    Glucose (POC) 149 (H) 65 - 100 mg/dL    Performed by [employer]    GLUCOSE, POC   Result Value Ref Range    Glucose (POC) 230 (H) 65 - 100 mg/dL    Performed by [employer]    GLUCOSE, POC   Result Value Ref Range    Glucose (POC) 125 (H) 65 - 100 mg/dL    Performed by [facility], POC   Result Value Ref Range    Glucose (POC) 114 (H) 65 - 100 mg/dL    Performed by Annelise Whittington        Immunizations administered during this encounter:   Immunization History   Administered Date(s) Administered    Influenza Vaccine PF 12/02/2014    Pneumococcal Polysaccharide (PPSV-23) 12/02/2014       Screening for Metabolic Disorders for Patients on Antipsychotic Medications  (Data obtained from the EMR)    Estimated Body Mass Index  Estimated body mass index is 28.13 kg/m² as calculated from the following:    Height as of this encounter: 5' 9\" (1.753 m). Weight as of this encounter: 86.4 kg (190 lb 8 oz).      Vital Signs/Blood Pressure  Visit Vitals  BP (!) 132/94 (BP 1 Location: Left arm, BP Patient Position: Sitting)   Pulse 99   Temp 98.5 °F (36.9 °C)   Resp 16   Ht 5' 9\" (1.753 m)   Wt 86.4 kg (190 lb 8 oz)   SpO2 98%   BMI 28.13 kg/m²       Blood Glucose/Hemoglobin A1c  Lab Results   Component Value Date/Time    Glucose 125 (H) 06/17/2020 05:43 AM    Glucose (POC) 114 (H) 06/17/2020 10:58 AM       Lab Results   Component Value Date/Time    Hemoglobin A1c 6.7 (H) 06/12/2020 03:53 PM        Lipid Panel  Lab Results   Component Value Date/Time    Cholesterol, total 169 06/16/2020 04:01 AM    HDL Cholesterol 42 06/16/2020 04:01 AM    LDL, calculated 96.2 06/16/2020 04:01 AM    Triglyceride 154 (H) 06/16/2020 04:01 AM    CHOL/HDL Ratio 4.0 06/16/2020 04:01 AM        Discharge Diagnosis: Major depressive disorder, recurrent, severe, without psychotic features. Discharge Plan: Patient discharged home via lyft with follow up through the [address]. The patient Cheryal Rolling exhibits the ability to control behavior in a less restrictive environment. Patient's level of functioning is improving. No assaultive/destructive behavior has been observed for the past 24 hours. No suicidal/homicidal threat or behavior has been observed for the past 24 hours. There is no evidence of serious medication side effects. Patient has not been in physical or protective restraints for at least the past 24 hours. If weapons involved, how are they secured? No weapons involved     Is patient aware of and in agreement with discharge plan? Arrangements for medication:  Prescriptions filled at [facility] 78 of discharge instructions to provider?:  Yes, fax to [phone]     Arrangements for transportation home:  Lyft (patient does not have transportation benefits through insurance. Keep all follow up appointments as scheduled, continue to take prescribed medications per physician instructions. Mental health crisis number:  444 or your local mental health crisis line number at [address]: [phone]     Discharge Medication List and Instructions:   Discharge Medication List as of 6/17/2020 12:11 PM      START taking these medications    Details   traZODone (DESYREL) 100 mg tablet Take 1 Tab by mouth nightly.  Indications: insomnia associated with depression, Normal, Disp-30 Tab, R-0      hydrOXYzine HCL (ATARAX) 50 mg tablet Take 1 Tab by mouth three (3) times daily as needed for Anxiety for up to 10 days. Indications: anxious, Normal, Disp-90 Tab, R-0      gabapentin (NEURONTIN) 400 mg capsule Take 1 Cap by mouth three (3) times daily. Max Daily Amount: 1,200 mg. Indications: neuropathic pain, Normal, Disp-90 Cap, R-0         CONTINUE these medications which have CHANGED    Details   sertraline (ZOLOFT) 100 mg tablet Take 1 Tab by mouth daily. Indications: major depressive disorder, Normal, Disp-30 Tab, R-0      lurasidone (LATUDA) 80 mg tab tablet Take 1 Tab by mouth daily (with dinner). Indications: bipolar depression, Normal, Disp-30 Tab, R-0      furosemide (LASIX) 40 mg tablet Take 1 Tab by mouth daily. .  Indications: visible water retention, high blood pressure, Normal, Disp-30 Tab, R-0         CONTINUE these medications which have NOT CHANGED    Details   valsartan (DIOVAN) 160 mg tablet Take 160 mg by mouth daily. , Historical Med      atorvastatin (LIPITOR) 10 mg tablet Take 1 Tab by mouth daily. Indications: prevent stroke, Normal, Disp-90 Tab, R-3      amLODIPine (NORVASC) 5 mg tablet Take 1 Tab by mouth daily. Indications: pressure, Normal, Disp-90 Tab, R-3      diclofenac (VOLTAREN) 1 % gel AAA in thin coat BID as needed for painful joints, Normal, Disp-100 g, R-5      aspirin delayed-release 81 mg tablet Take 1 Tab by mouth daily.  Indications: prevent heart problem, Normal, Disp-100 Tab, R-1Jail pt         STOP taking these medications       metFORMIN (GLUCOPHAGE) 1,000 mg tablet Comments:   Reason for Stopping:         gabapentin (NEURONTIN) 800 mg tablet Comments:   Reason for Stopping:         nicotine (NICODERM CQ) 14 mg/24 hr patch Comments:   Reason for Stopping:         metFORMIN (GLUCOPHAGE) 1,000 mg tablet Comments:   Reason for Stopping:         gabapentin (NEURONTIN) 800 mg tablet Comments:   Reason for Stopping:         folic acid (FOLVITE) 1 mg tablet Comments: Reason for Stopping:         thiamine HCL (B-1) 100 mg tablet Comments:   Reason for Stopping:               Unresulted Labs (24h ago, onward)    None        To obtain results of studies pending at discharge, please contact [phone]    Follow-up Information     Follow up With Specialties Details Why Contact Info    Naomi Barnes MD Family Practice Schedule an appointment as soon as possible for a visit in 1 week Assess kidney function and whether to resumse valsartan and metformin [facility]  [phone]      [facility]   On 6/24/2020 you have a same day access telepsych appointment at 8:30 am with [facility]  [address] [place], [address]  Phone:  [phone]      Naomi Barnes MD Family Practice Schedule an appointment as soon as possible for a visit in 1 week diabetes management - [facility]  [phone]            Advanced Directive:   Does the patient have an appointed surrogate decision maker? No  Does the patient have a Medical Advance Directive? No  Does the patient have a Psychiatric Advance Directive? No  If the patient does not have a surrogate or Medical Advance Directive AND Psychiatric Advance Directive, the patient was offered information on these advance directives Patient declined to complete    Patient Instructions: Please continue all medications until otherwise directed by physician. Tobacco Cessation Discharge Plan:   Is the patient a smoker and needs referral for smoking cessation? Yes  Patient referred to the following for smoking cessation with an appointment? Refused     Patient was offered medication to assist with smoking cessation at discharge? Refused  Was education for smoking cessation added to the discharge instructions? Yes    Alcohol/Substance Abuse Discharge Plan:   Does the patient have a history of substance/alcohol abuse and requires a referral for treatment? No  Patient referred to the following for substance/alcohol abuse treatment with an appointment? Not applicable  Patient was offered medication to assist with alcohol cessation at discharge? Not applicable  Was education for substance/alcohol abuse added to discharge instructions? No    Patient discharged to Home; discussed with patient/caregiver and provided to the patient/caregiver either in hard copy or electronically.

## 2020-06-17 NOTE — PROGRESS NOTES
Amita Snow participated in [address] [place] [address] on 6/17/2020. Rev.  Rachana Yusuf MDiv, [facility], [facility] Hospitaling service: [facility]1954[facility]

## 2020-06-19 NOTE — PROGRESS NOTES
Reached out to patient at [phone] o due t2 for follow up. Patient's voicemail came on and no message left due to confidentiality.

## 2020-06-29 PROBLEM — Z91.89 HISTORY OF DRUG OVERDOSE: Status: ACTIVE | Noted: 2020-06-11

## 2020-08-05 ENCOUNTER — HOSPITAL ENCOUNTER (INPATIENT)
Age: 60
LOS: 3 days | Discharge: PSYCHIATRIC OR SUBSTANCE USE FACILITY | DRG: 683 | End: 2020-08-08
Attending: FAMILY MEDICINE | Admitting: INTERNAL MEDICINE
Payer: MEDICARE

## 2020-08-05 PROBLEM — A41.9 SEPSIS (HCC): Status: ACTIVE | Noted: 2020-08-05

## 2020-08-05 PROBLEM — Z86.59 HISTORY OF SCHIZOAFFECTIVE DISORDER: Status: ACTIVE | Noted: 2020-08-05

## 2020-08-05 PROBLEM — N17.9 AKI (ACUTE KIDNEY INJURY) (HCC): Status: ACTIVE | Noted: 2020-08-05

## 2020-08-05 PROBLEM — I95.9 HYPOTENSION: Status: ACTIVE | Noted: 2020-08-05

## 2020-08-05 PROBLEM — N17.9 ACUTE RENAL FAILURE (ARF) (HCC): Status: ACTIVE | Noted: 2020-08-05

## 2020-08-05 LAB
ALBUMIN SERPL-MCNC: 3.3 G/DL (ref 3.5–5)
ALBUMIN/GLOB SERPL: 1 {RATIO} (ref 1.1–2.2)
ALP SERPL-CCNC: 60 U/L (ref 45–117)
ALT SERPL-CCNC: 32 U/L (ref 12–78)
ANION GAP SERPL CALC-SCNC: 11 MMOL/L (ref 5–15)
APPEARANCE UR: CLEAR
AST SERPL-CCNC: 23 U/L (ref 15–37)
BACTERIA URNS QL MICRO: NEGATIVE /HPF
BASOPHILS # BLD: 0.1 K/UL (ref 0–0.1)
BASOPHILS NFR BLD: 1 % (ref 0–1)
BILIRUB SERPL-MCNC: 0.3 MG/DL (ref 0.2–1)
BILIRUB UR QL: NEGATIVE
BUN SERPL-MCNC: 26 MG/DL (ref 6–20)
BUN/CREAT SERPL: 12 (ref 12–20)
CALCIUM SERPL-MCNC: 8.7 MG/DL (ref 8.5–10.1)
CHLORIDE SERPL-SCNC: 106 MMOL/L (ref 97–108)
CO2 SERPL-SCNC: 21 MMOL/L (ref 21–32)
COLOR UR: NORMAL
COMMENT, HOLDF: NORMAL
CREAT SERPL-MCNC: 2.1 MG/DL (ref 0.7–1.3)
DIFFERENTIAL METHOD BLD: ABNORMAL
EOSINOPHIL # BLD: 0.1 K/UL (ref 0–0.4)
EOSINOPHIL NFR BLD: 2 % (ref 0–7)
EPITH CASTS URNS QL MICRO: NORMAL /LPF
ERYTHROCYTE [DISTWIDTH] IN BLOOD BY AUTOMATED COUNT: 12.8 % (ref 11.5–14.5)
GLOBULIN SER CALC-MCNC: 3.3 G/DL (ref 2–4)
GLUCOSE BLD STRIP.AUTO-MCNC: 111 MG/DL (ref 65–100)
GLUCOSE BLD STRIP.AUTO-MCNC: 116 MG/DL (ref 65–100)
GLUCOSE BLD STRIP.AUTO-MCNC: 122 MG/DL (ref 65–100)
GLUCOSE BLD STRIP.AUTO-MCNC: 98 MG/DL (ref 65–100)
GLUCOSE SERPL-MCNC: 110 MG/DL (ref 65–100)
GLUCOSE UR STRIP.AUTO-MCNC: NEGATIVE MG/DL
HCT VFR BLD AUTO: 35.1 % (ref 36.6–50.3)
HGB BLD-MCNC: 11.6 G/DL (ref 12.1–17)
HGB UR QL STRIP: NEGATIVE
HYALINE CASTS URNS QL MICRO: NORMAL /LPF (ref 0–5)
IMM GRANULOCYTES # BLD AUTO: 0 K/UL (ref 0–0.04)
IMM GRANULOCYTES NFR BLD AUTO: 0 % (ref 0–0.5)
KETONES UR QL STRIP.AUTO: NEGATIVE MG/DL
LACTATE SERPL-SCNC: 0.8 MMOL/L (ref 0.4–2)
LEUKOCYTE ESTERASE UR QL STRIP.AUTO: NEGATIVE
LYMPHOCYTES # BLD: 2.7 K/UL (ref 0.8–3.5)
LYMPHOCYTES NFR BLD: 37 % (ref 12–49)
MAGNESIUM SERPL-MCNC: 1.7 MG/DL (ref 1.6–2.4)
MCH RBC QN AUTO: 30.4 PG (ref 26–34)
MCHC RBC AUTO-ENTMCNC: 33 G/DL (ref 30–36.5)
MCV RBC AUTO: 91.9 FL (ref 80–99)
MONOCYTES # BLD: 0.7 K/UL (ref 0–1)
MONOCYTES NFR BLD: 9 % (ref 5–13)
NEUTS SEG # BLD: 3.9 K/UL (ref 1.8–8)
NEUTS SEG NFR BLD: 51 % (ref 32–75)
NITRITE UR QL STRIP.AUTO: NEGATIVE
NRBC # BLD: 0 K/UL (ref 0–0.01)
NRBC BLD-RTO: 0 PER 100 WBC
PH UR STRIP: 5 [PH] (ref 5–8)
PHOSPHATE SERPL-MCNC: 3.8 MG/DL (ref 2.6–4.7)
PLATELET # BLD AUTO: 178 K/UL (ref 150–400)
PMV BLD AUTO: 9.8 FL (ref 8.9–12.9)
POTASSIUM SERPL-SCNC: 4 MMOL/L (ref 3.5–5.1)
PROCALCITONIN SERPL-MCNC: <0.05 NG/ML
PROT SERPL-MCNC: 6.6 G/DL (ref 6.4–8.2)
PROT UR STRIP-MCNC: NEGATIVE MG/DL
RBC # BLD AUTO: 3.82 M/UL (ref 4.1–5.7)
RBC #/AREA URNS HPF: NORMAL /HPF (ref 0–5)
SAMPLES BEING HELD,HOLD: NORMAL
SERVICE CMNT-IMP: ABNORMAL
SERVICE CMNT-IMP: NORMAL
SODIUM SERPL-SCNC: 138 MMOL/L (ref 136–145)
SP GR UR REFRACTOMETRY: 1.01 (ref 1–1.03)
UA: UC IF INDICATED,UAUC: NORMAL
UROBILINOGEN UR QL STRIP.AUTO: 0.2 EU/DL (ref 0.2–1)
WBC # BLD AUTO: 7.4 K/UL (ref 4.1–11.1)
WBC URNS QL MICRO: NORMAL /HPF (ref 0–4)

## 2020-08-05 PROCEDURE — 80053 COMPREHEN METABOLIC PANEL: CPT

## 2020-08-05 PROCEDURE — 36415 COLL VENOUS BLD VENIPUNCTURE: CPT

## 2020-08-05 PROCEDURE — 83605 ASSAY OF LACTIC ACID: CPT

## 2020-08-05 PROCEDURE — 85025 COMPLETE CBC W/AUTO DIFF WBC: CPT

## 2020-08-05 PROCEDURE — 96376 TX/PRO/DX INJ SAME DRUG ADON: CPT

## 2020-08-05 PROCEDURE — 74011250636 HC RX REV CODE- 250/636: Performed by: NURSE PRACTITIONER

## 2020-08-05 PROCEDURE — P9047 ALBUMIN (HUMAN), 25%, 50ML: HCPCS | Performed by: NURSE PRACTITIONER

## 2020-08-05 PROCEDURE — 99218 HC RM OBSERVATION: CPT

## 2020-08-05 PROCEDURE — 96374 THER/PROPH/DIAG INJ IV PUSH: CPT

## 2020-08-05 PROCEDURE — 84145 PROCALCITONIN (PCT): CPT

## 2020-08-05 PROCEDURE — 74011000250 HC RX REV CODE- 250: Performed by: NURSE PRACTITIONER

## 2020-08-05 PROCEDURE — 81001 URINALYSIS AUTO W/SCOPE: CPT

## 2020-08-05 PROCEDURE — 84100 ASSAY OF PHOSPHORUS: CPT

## 2020-08-05 PROCEDURE — 82962 GLUCOSE BLOOD TEST: CPT

## 2020-08-05 PROCEDURE — 96375 TX/PRO/DX INJ NEW DRUG ADDON: CPT

## 2020-08-05 PROCEDURE — 94762 N-INVAS EAR/PLS OXIMTRY CONT: CPT

## 2020-08-05 PROCEDURE — 65270000029 HC RM PRIVATE

## 2020-08-05 PROCEDURE — 87086 URINE CULTURE/COLONY COUNT: CPT

## 2020-08-05 PROCEDURE — 74011250637 HC RX REV CODE- 250/637: Performed by: NURSE PRACTITIONER

## 2020-08-05 PROCEDURE — 96372 THER/PROPH/DIAG INJ SC/IM: CPT

## 2020-08-05 PROCEDURE — 83735 ASSAY OF MAGNESIUM: CPT

## 2020-08-05 RX ORDER — SODIUM CHLORIDE 0.9 % (FLUSH) 0.9 %
5-40 SYRINGE (ML) INJECTION EVERY 8 HOURS
Status: DISCONTINUED | OUTPATIENT
Start: 2020-08-05 | End: 2020-08-08 | Stop reason: HOSPADM

## 2020-08-05 RX ORDER — ACETAMINOPHEN 650 MG/1
650 SUPPOSITORY RECTAL
Status: DISCONTINUED | OUTPATIENT
Start: 2020-08-05 | End: 2020-08-08 | Stop reason: HOSPADM

## 2020-08-05 RX ORDER — ASPIRIN 81 MG/1
81 TABLET ORAL DAILY
Status: DISCONTINUED | OUTPATIENT
Start: 2020-08-05 | End: 2020-08-08 | Stop reason: HOSPADM

## 2020-08-05 RX ORDER — ACETAMINOPHEN 325 MG/1
650 TABLET ORAL
Status: DISCONTINUED | OUTPATIENT
Start: 2020-08-05 | End: 2020-08-08 | Stop reason: HOSPADM

## 2020-08-05 RX ORDER — POLYETHYLENE GLYCOL 3350 17 G/17G
17 POWDER, FOR SOLUTION ORAL DAILY PRN
Status: DISCONTINUED | OUTPATIENT
Start: 2020-08-05 | End: 2020-08-08 | Stop reason: HOSPADM

## 2020-08-05 RX ORDER — ONDANSETRON 2 MG/ML
4 INJECTION INTRAMUSCULAR; INTRAVENOUS
Status: DISCONTINUED | OUTPATIENT
Start: 2020-08-05 | End: 2020-08-08 | Stop reason: HOSPADM

## 2020-08-05 RX ORDER — PROMETHAZINE HYDROCHLORIDE 25 MG/1
12.5 TABLET ORAL
Status: DISCONTINUED | OUTPATIENT
Start: 2020-08-05 | End: 2020-08-08 | Stop reason: HOSPADM

## 2020-08-05 RX ORDER — DEXTROSE MONOHYDRATE 100 MG/ML
0-250 INJECTION, SOLUTION INTRAVENOUS AS NEEDED
Status: DISCONTINUED | OUTPATIENT
Start: 2020-08-05 | End: 2020-08-08 | Stop reason: HOSPADM

## 2020-08-05 RX ORDER — IBUPROFEN 200 MG
1 TABLET ORAL DAILY
Status: DISCONTINUED | OUTPATIENT
Start: 2020-08-05 | End: 2020-08-08 | Stop reason: HOSPADM

## 2020-08-05 RX ORDER — SERTRALINE HYDROCHLORIDE 50 MG/1
100 TABLET, FILM COATED ORAL DAILY
Status: DISCONTINUED | OUTPATIENT
Start: 2020-08-05 | End: 2020-08-08 | Stop reason: HOSPADM

## 2020-08-05 RX ORDER — MAGNESIUM SULFATE 100 %
4 CRYSTALS MISCELLANEOUS AS NEEDED
Status: DISCONTINUED | OUTPATIENT
Start: 2020-08-05 | End: 2020-08-08 | Stop reason: HOSPADM

## 2020-08-05 RX ORDER — ATORVASTATIN CALCIUM 10 MG/1
10 TABLET, FILM COATED ORAL DAILY
Status: DISCONTINUED | OUTPATIENT
Start: 2020-08-05 | End: 2020-08-08 | Stop reason: HOSPADM

## 2020-08-05 RX ORDER — ALBUMIN HUMAN 250 G/1000ML
25 SOLUTION INTRAVENOUS EVERY 6 HOURS
Status: COMPLETED | OUTPATIENT
Start: 2020-08-05 | End: 2020-08-05

## 2020-08-05 RX ORDER — SODIUM CHLORIDE 0.9 % (FLUSH) 0.9 %
5-40 SYRINGE (ML) INJECTION AS NEEDED
Status: DISCONTINUED | OUTPATIENT
Start: 2020-08-05 | End: 2020-08-08 | Stop reason: HOSPADM

## 2020-08-05 RX ORDER — SODIUM CHLORIDE, SODIUM LACTATE, POTASSIUM CHLORIDE, CALCIUM CHLORIDE 600; 310; 30; 20 MG/100ML; MG/100ML; MG/100ML; MG/100ML
100 INJECTION, SOLUTION INTRAVENOUS CONTINUOUS
Status: DISCONTINUED | OUTPATIENT
Start: 2020-08-05 | End: 2020-08-06

## 2020-08-05 RX ORDER — VALSARTAN 80 MG/1
160 TABLET ORAL DAILY
Status: DISCONTINUED | OUTPATIENT
Start: 2020-08-05 | End: 2020-08-05

## 2020-08-05 RX ORDER — INSULIN LISPRO 100 [IU]/ML
INJECTION, SOLUTION INTRAVENOUS; SUBCUTANEOUS
Status: DISCONTINUED | OUTPATIENT
Start: 2020-08-05 | End: 2020-08-08 | Stop reason: HOSPADM

## 2020-08-05 RX ORDER — HEPARIN SODIUM 5000 [USP'U]/ML
5000 INJECTION, SOLUTION INTRAVENOUS; SUBCUTANEOUS EVERY 12 HOURS
Status: DISCONTINUED | OUTPATIENT
Start: 2020-08-05 | End: 2020-08-08 | Stop reason: HOSPADM

## 2020-08-05 RX ADMIN — ATORVASTATIN CALCIUM 10 MG: 10 TABLET, FILM COATED ORAL at 09:35

## 2020-08-05 RX ADMIN — ALBUMIN (HUMAN) 25 G: 0.25 INJECTION, SOLUTION INTRAVENOUS at 19:03

## 2020-08-05 RX ADMIN — ASPIRIN 81 MG: 81 TABLET, COATED ORAL at 09:35

## 2020-08-05 RX ADMIN — LURASIDONE HYDROCHLORIDE 80 MG: 80 TABLET, FILM COATED ORAL at 20:01

## 2020-08-05 RX ADMIN — FAMOTIDINE 20 MG: 10 INJECTION, SOLUTION INTRAVENOUS at 09:35

## 2020-08-05 RX ADMIN — ALBUMIN (HUMAN) 25 G: 0.25 INJECTION, SOLUTION INTRAVENOUS at 12:09

## 2020-08-05 RX ADMIN — SODIUM CHLORIDE, SODIUM LACTATE, POTASSIUM CHLORIDE, AND CALCIUM CHLORIDE 100 ML/HR: 600; 310; 30; 20 INJECTION, SOLUTION INTRAVENOUS at 04:31

## 2020-08-05 RX ADMIN — HEPARIN SODIUM 5000 UNITS: 5000 INJECTION INTRAVENOUS; SUBCUTANEOUS at 04:31

## 2020-08-05 RX ADMIN — HEPARIN SODIUM 5000 UNITS: 5000 INJECTION INTRAVENOUS; SUBCUTANEOUS at 16:08

## 2020-08-05 RX ADMIN — ACETAMINOPHEN 650 MG: 325 TABLET ORAL at 06:43

## 2020-08-05 RX ADMIN — SERTRALINE HYDROCHLORIDE 100 MG: 50 TABLET ORAL at 09:35

## 2020-08-05 RX ADMIN — ALBUMIN (HUMAN) 25 G: 0.25 INJECTION, SOLUTION INTRAVENOUS at 06:52

## 2020-08-05 NOTE — PROGRESS NOTES
Called for both knee pain - chronic. Substance abuse hx. Tyelnol prn.  Defer further to primary team.

## 2020-08-05 NOTE — PROGRESS NOTES
Problem: Suicide  Goal: *STG: Remains safe in hospital  Outcome: Progressing Towards Goal  Goal: Interventions  Outcome: Progressing Towards Goal  Patient has sitter for safety. Room has proper suicide precautions- chords have been removed, paper trash bags. Psych consult in. Bedside shift change report given to Ivanna Arce (oncoming nurse) by Jean Goodell, RN (offgoing nurse). Report included the following information SBAR, Kardex, ED Summary, Recent Results and Cardiac Rhythm NSR.
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
## 2020-08-05 NOTE — PROGRESS NOTES
On assessment, pt is cooperative with a flat affect, states that he is currently having suicidal ideations. Sitter at bedside for Q15min checks. [address] resulted negative, DO Jett Verduzcoannie does not want to retest, isolation precautions removed. Plan is for pt to remain on medical for a few more days to assess kidney function, then will be transferred to [facility]. 1220: TRANSFER - OUT REPORT:    Verbal report given to Byron Lucas RN (name) on Gabino Press  being transferred to  (unit) for routine progression of care       Report consisted of patients Situation, Background, Assessment and   Recommendations(SBAR). Information from the following report(s) SBAR, Kardex, ED Summary, Intake/Output, MAR, Recent Results and Cardiac Rhythm NSR was reviewed with the receiving nurse. Lines:   Peripheral IV 08/05/20 Left;Posterior Hand (Active)   Site Assessment Clean, dry, & intact 08/05/20 0830   Phlebitis Assessment 0 08/05/20 0830   Infiltration Assessment 0 08/05/20 0830   Dressing Status Clean, dry, & intact 08/05/20 0830   Dressing Type Transparent 08/05/20 0830   Hub Color/Line Status Pink; Infusing 08/05/20 0830   Action Taken Open ports on tubing capped 08/05/20 0830   Alcohol Cap Used Yes 08/05/20 0830      Opportunity for questions and clarification was provided.       Patient transported with:   Registered Nurse  Tech  Pt belongings  Bed

## 2020-08-05 NOTE — PROGRESS NOTES
Reason for Admission:   Admitted from home after an admitted suicide attempt. Patient has had several admissions for same. RUR Score:    Observation                 Plan for utilizing home health:   No skilled needs identified. He is independent at  baseline       PCP: First and Last name: Dimitrios Samaniego    Name of Practice:    Are you a current patient: Yes/No: Yes  Approximate date of last visit: 6/2020  Can you participate in a virtual visit with your PCP: No                    Current Advanced Directive/Advance Care Plan: Does not have and declines to have one completed. Brothryan Chaudhry that he lives with is NOK. Transition of Care Plan:    Psychiatry consult  Anticipate that he will discharge home once cleared by Psychiatry.     Care Management Interventions  PCP Verified by CM: Yes(6/2020)  Palliative Care Criteria Met (RRAT>21 & CHF Dx)?: No  Mode of Transport at Discharge: (private car)  Current Support Network: (Lives with brother in brother's home)  Confirm Follow Up Transport: Self  The Patient and/or Patient Representative was Provided with a Choice of Provider and Agrees with the Discharge Plan?: (brother Bayron Chaudhry)  On license of [facility] Provided?: No    Jenn Traylor RN CRM  Ext [phone]

## 2020-08-05 NOTE — CONSULTS
Psychiatric Consultation      DATE OF EVALUATION:  8/5/2020    ATTENDING PHYSICIAN:  Dr. Tessa Farfan:    Psychiatric consultation requested for Gui Craven to evaluate patient for depression with suicidal ideations. HISTORY OF PRESENT ILLNESS:  The patient, Gui Craven, is a 63 y.o.,   male who admitted to the hospital for possible sepsis, renal failure. He is on the inpatient medical unit with complaints of depression with suicidal ideations with a plan. PAST MEDICAL HISTORY  Patient Active Problem List    Diagnosis Date Noted    RILEY (acute kidney injury) ([address] 08/05/2020    Sepsis ([address] 08/05/2020    Hypotension 08/05/2020    History of schizoaffective disorder 08/05/2020    Acute renal failure (ARF) ([address] 08/05/2020    Substance induced mood disorder ([address] 06/13/2020    History of drug overdose 06/11/2020    Hepatitis C 01/29/2016    Schizophrenia, chronic with acute exacerbation ([facility] 06/07/2015    Cocaine abuse ([facility] 06/07/2015    Alcohol-induced mental disorders ([facility] 05/15/2015    Alcohol abuse 05/12/2015    Diabetes mellitus type 2, controlled ([address] 05/12/2015    Chest pain 12/01/2014    Numbness and tingling of right arm 12/01/2014    Hypertension 12/01/2014     Past Medical History:   Diagnosis Date    Alcohol abuse     Chronic pain     Cocaine abuse ([facility]     Diabetes (HCC)     Hepatitis C     Hypertension     Psychiatric disorder     Depression & Schizoaffective Disorder        MEDICATION PRIOR TO ADMISSION:  Prior to Admission medications    Medication Sig Start Date End Date Taking? Authorizing Provider   Blood Pressure Test Kit-Large kit 1 Kit by Does Not Apply route Once every 2 weeks. Indications: dx I10 7/30/20   Maggi Romero MD   glucose blood VI test strips (Accu-Chek Bea Plus test strp) strip 1 Each by Does Not Apply route three (3) times daily.  Check 3 times daily    Provider, Franky   lancets misc Dx:e11.9, not on insulin; use to check sugar daily as directed. 7/20/20   Gabi Valencia MD   metFORMIN (GLUCOPHAGE) 1,000 mg tablet Take 1 Tab by mouth two (2) times daily (with meals). Indications: type 2 diabetes mellitus 7/2/20   Gabi Valencia MD   lurasidone (LATUDA) 80 mg tab tablet Take 1 Tab by mouth daily (with dinner). Indications: nerves and depression 6/29/20   Gabi Valencia MD   sertraline (ZOLOFT) 100 mg tablet Take 1 Tab by mouth daily. Indications: major depressive disorder 6/29/20   Gabi Valencia MD   gabapentin (NEURONTIN) 400 mg capsule Take 1 Cap by mouth three (3) times daily. Max Daily Amount: 1,200 mg. Indications: neuropathic pain 7/13/20   Gabi Valencia MD   furosemide (LASIX) 40 mg tablet Take 1 Tab by mouth daily. .  Indications: pressure and fluid 6/29/20   Alecia Vazquez MD   diclofenac (VOLTAREN) 1 % gel AAA in thin coat BID as needed for painful joints  Indications: joint damage causing pain and loss of function 6/29/20   Alecia Vazquez MD   traZODone (DESYREL) 100 mg tablet Take 1 Tab by mouth nightly. Indications: insomnia associated with depression 6/17/20   Jasmina Alva MD   valsartan (DIOVAN) 160 mg tablet Take 160 mg by mouth daily. ProviderFranky   atorvastatin (LIPITOR) 10 mg tablet Take 1 Tab by mouth daily. Indications: prevent stroke 5/22/20   Gabi Valencia MD   amLODIPine (NORVASC) 5 mg tablet Take 1 Tab by mouth daily. Indications: pressure 5/22/20   Alecia Vazquez MD   aspirin delayed-release 81 mg tablet Take 1 Tab by mouth daily.  Indications: prevent heart problem 3/2/20   Gabi Valencia MD       ALLERGIES:  Allergies   Allergen Reactions    Tramadol Nausea and Vomiting    Lisinopril Cough     Developed cough while taking lisinopril        SOCIAL HISTORY:  Social History     Tobacco Use    Smoking status: Current Every Day Smoker     Packs/day: 1.00     Types: Cigarettes     Last attempt to quit: 9/1/2016     Years since quitting: 3.9    Smokeless tobacco: Never Used   Substance Use Topics    Alcohol use: Not Currently     Alcohol/week: 0.0 standard drinks     Comment: h/o of abuse states sober x1 year       FAMILY HISTORY:  Family History   Problem Relation Age of Onset    Lung Cancer Mother         REVIEW OF SYSTEMS:  The patient reports complaints of depression with suicidal ideations with plan and intent. Medical review of systems mainly considered negative. MENTAL STATUS EXAM:  Sensorium  oriented to time, place and person   Orientation person, place, time/date, situation, day of week, month of year and year   Relations cooperative   Eye Contact poor   Appearance:   older than stated age   Motor Behavior:  within normal limits   Speech:  normal pitch and normal volume   Vocabulary below average   Thought Process: within normal limits   Thought Content free of delusions, free of hallucinations and not internally preoccupied    Suicidal ideations plan and intention   Homicidal ideations none   Mood:  depressed and sad   Affect:  depressed   Memory recent  adequate   Memory remote:  adequate   Concentration:  adequate   Abstraction:  abstract   Insight:  limited   Reliability fair   Judgment:  fair     ASSESSEMENT:  The patient is a 63 y.o.  male with a history of schizoaffective disorder. He is alert and oriented with flat, depressed affect. He states that he has been feeling depressed while in the hospital, stating that he lost his medications approximately one week ago and has not been taking any of them. He states that he has been having suicidal ideations with plan and intent of filling up a bathtub to drown himself. He reports that he does have a history of both auditory and visual hallucination but denies any currently. He states that he has a history of cocaine abuse, states that last use was approximately 3 days ago.   Reports that he lives with his brother but has not talked to any family or friends since hospital admissions, not not have good social support. PLAN / RECOMMENDATION:  *Restart medications, specifically Latuda, make sure to take with at least 350 calories. *Reconsult psychiatry for admission to [facility] for  once patient is medical clear  * continue 1:1 suicide precautions. We will follow patient's course along with you as necessary. Thank you for the opportunity to participate in the care of your patient.                     SIGNED:    Chester Dominguez NP  8/5/2020

## 2020-08-05 NOTE — ACP (ADVANCE CARE PLANNING)
Advance Care Planning     Advance Care Planning Activator (Inpatient)  Conversation Note      Date of ACP Conversation: 08/05/20     Conversation Conducted with:   Patient with Decision Making Capacity    ACP Activator: Mary Kay Kamara RN    *When Decision Maker makes decisions on behalf of the incapacitated patient: Decision Maker is asked to consider and make decisions based on patient values, known preferences, or best interests. Health Care Decision Maker:    Current Designated Health Care Decision Maker:   Primary Decision Maker: Bryn Osborn -  - [phone]  (If there is a valid Health Care Decision Maker named in the \"Healthcare Decision Makers\" box in the ACP activity, but it is not visible above, be sure to open that field and then select the health care decision maker relationship (ie \"primary\") in the blank space to the right of the name.) Validate  this information as still accurate & up-to-date; edit [address] field as needed.)    Note: Assess and validate information in current ACP documents, as indicated. If no Decision Maker listed above or available through scanned documents, then:    If no Authorized Decision Maker has previously been identified, then patient chooses [address]:  \"Who would you like to name as your primary health care decision-maker? \"    Name: Andres Carlos   Relationship:  Phone number: [phone]  Geraldine Robledons this person be reached easily? \" YES  Care Preferences    Ventilation: \"If you were in your present state of health and suddenly became very ill and were unable to breathe on your own, what would your preference be about the use of a ventilator (breathing machine) if it were available to you? \"      If patient would desire the use of a ventilator (breathing machine), answer \"yes\", if not \"no\":yes    \"If your health worsens and it becomes clear that your chance of recovery is unlikely, what would your preference be about the use of a ventilator (breathing machine) if it were available to you? \"     Would the patient desire the use of a ventilator (breathing machine)? YES      Resuscitation  \"CPR works best to restart the heart when there is a sudden event, like a heart attack, in someone who is otherwise healthy. Unfortunately, CPR does not typically restart the heart for people who have serious health conditions or who are very sick. \"    \"In the event your heart stopped as a result of an underlying serious health condition, would you want attempts to be made to restart your heart (answer \"yes\" for attempt to resuscitate) or would you prefer a natural death (answer \"no\" for do not attempt to resuscitate)? \" yes    [] Yes  [] No   Educated Patient / Rory Riggs regarding differences between Advance Directives and portable DNR orders.     Length of ACP Conversation in minutes:      Conversation Outcomes:  [] ACP discussion completed  [] Existing advance directive reviewed with patient; no changes to patient's previously recorded wishes     [] New Advance Directive completed   [] Portable Do Not Resuscitate prepared for Provider review and signature  [] POLST/POST/MOLST/MOST prepared for Provider review and signature      Follow-up plan:    [] Schedule follow-up conversation to continue planning  [] Referred individual to Provider for additional questions/concerns   [] Advised patient/agent/surrogate to review completed ACP document and update if needed with changes in condition, patient preferences or care setting     [] This note routed to one or more involved healthcare providers

## 2020-08-05 NOTE — PROGRESS NOTES
Hospitalist progress note. Patient seen and examined, chart reviewed. Admitted earlier today by Joshua Briceño NP. Patient in room with sitter at bedside. Has continued suicidal ideation, denies homicidal ideation. Appreciate psychiatry.     Assessment/plan:    Acute renal failure: Improving  Metabolic acidosis: Resolved  Lactic acidosis: Resolved  -Suspect secondary to decreased oral intake  -Continue IV fluids and monitor    Hypotension with history of hypertension:  -Hold home medications  -Albumin as ordered    Suicidal ideation: Ongoing  -Psychiatry consult  -sitter 1:1    Sepsis ruled out  -Procalcitonin negative, discontinue Rocephin, suspect lactic acidosis secondary to acute renal failure    Diabetes mellitus, type II, controlled H A1c 6.7:  -Continue SSI while hospitalized    Substance abuse: UDS positive for cocaine    Tobacco abuse: Counseled for cessation    DVT: heparin  Code: full

## 2020-08-05 NOTE — H&P
[facility] Adult  Hospitalist Group  History and Physical    Primary Care Provider: Ruddy Adan MD  Date of Service:  8/5/2020    Subjective:     Arielle Washburn is a 63 y.o. male who presented to [address] [place] [address] ED with chief complaint of suicidal ideation. He was admitted to [facility] in June this year with some medication adjustments including addition of Latuda down and Zoloft. He had a plan to throw a radio into a bathtub and electrocute himself. He denied any HI at the time. Upon valuation in the ED he was found to be septic with lactic acid of 4.9, hypotensive, tachycardic, mildly febrile with temp of 99. Sepsis protocol was initiated and patient ended up receiving a total of 4 L of IV fluids, 1 dose of ceftriaxone and 1 dose of Zithromax IV. Patient had reported a couple of days history of cough with some clear mucus production as well as some shortness of breath and nausea and vomiting. WBCs were normal limits. Troponin was negative. Chest x-ray was negative. According to ED assessment, patient had some mild rhonchi in the left middle lobe and so he was given the Zithromax and started on ceftriaxone. COVID work-up was initiated as well. Of note, he was negative for COVID on 6/2020. He was further found to have RILEY and be positive for cocaine. He was transferred to [facility] due to RILEY and psych consult. Today the patient is seen in isolation for COVID r/o with sitter at bedside for suicide precautions. The patient is alert and oriented, flat affect. Moves all extremities. Currently denies headache, dizziness, chest pain, shortness of breath, cough, abdominal pain, nausea vomiting or diarrhea. Denies dysuria as well. Reports that the chest pain, shortness of breath, cough, nausea and vomiting resolved prior to coming to [facility]. Discussed with Dr. William Persaud.     Review of Systems:    A comprehensive review of systems was negative except for that written in the History of Present Illness. Past Medical History:   Diagnosis Date    Alcohol abuse     Chronic pain     Cocaine abuse ([facility]     Diabetes ([facility]     Hepatitis C     Hypertension     Psychiatric disorder     Depression & Schizoaffective Disorder      Past Surgical History:   Procedure Laterality Date    HX ORTHOPAEDIC      right foot gangrene     Prior to Admission medications    Medication Sig Start Date End Date Taking? Authorizing Provider   Blood Pressure Test Kit-Large kit 1 Kit by Does Not Apply route Once every 2 weeks. Indications: dx I10 7/30/20   Juan Carlos Alford MD   glucose blood VI test strips (Accu-Chek Bea Plus test strp) strip 1 Each by Does Not Apply route three (3) times daily. Check 3 times daily    Provider, Historical   lancets misc Dx:e11.9, not on insulin; use to check sugar daily as directed. 7/20/20   Juan Carlos Alford MD   metFORMIN (GLUCOPHAGE) 1,000 mg tablet Take 1 Tab by mouth two (2) times daily (with meals). Indications: type 2 diabetes mellitus 7/2/20   Juan Carlos Alford MD   lurasidone (LATUDA) 80 mg tab tablet Take 1 Tab by mouth daily (with dinner). Indications: nerves and depression 6/29/20   Juan Carlos Alford MD   sertraline (ZOLOFT) 100 mg tablet Take 1 Tab by mouth daily. Indications: major depressive disorder 6/29/20   Juan Carlos Alford MD   gabapentin (NEURONTIN) 400 mg capsule Take 1 Cap by mouth three (3) times daily. Max Daily Amount: 1,200 mg. Indications: neuropathic pain 7/13/20   Juan Carlos Alford MD   furosemide (LASIX) 40 mg tablet Take 1 Tab by mouth daily. .  Indications: pressure and fluid 6/29/20   Vincent Vazquez MD   diclofenac (VOLTAREN) 1 % gel AAA in thin coat BID as needed for painful joints  Indications: joint damage causing pain and loss of function 6/29/20   Vincent aVzquez MD   traZODone (DESYREL) 100 mg tablet Take 1 Tab by mouth nightly.  Indications: insomnia associated with depression 6/17/20   Merritt Small MD valsartan (DIOVAN) 160 mg tablet Take 160 mg by mouth daily. Provider, Historical   atorvastatin (LIPITOR) 10 mg tablet Take 1 Tab by mouth daily. Indications: prevent stroke 5/22/20   Tacos Perla MD   amLODIPine (NORVASC) 5 mg tablet Take 1 Tab by mouth daily. Indications: pressure 5/22/20   Anette Vazquez MD   aspirin delayed-release 81 mg tablet Take 1 Tab by mouth daily. Indications: prevent heart problem 3/2/20   Tacos Perla MD     Allergies   Allergen Reactions    Tramadol Nausea and Vomiting    Lisinopril Cough     Developed cough while taking lisinopril      Family History   Problem Relation Age of Onset    Lung Cancer Mother         SOCIAL HISTORY:  Patient resides at Home. Patient ambulates with walking   Smoking history: Current, every day-counseled; nicotine patch ordered  Alcohol history: Prev hx etoh abuse; states sober x1 year    Objective:       Physical Exam:   Visit Vitals  BP 99/71 (BP 1 Location: Left arm, BP Patient Position: Lying left side)   Pulse (!) 102   Temp 98.3 °F (36.8 °C)   Resp 19   Ht 5' 9\" (1.753 m)   Wt 93.5 kg (206 lb 2.1 oz)   SpO2 94%   BMI 30.44 kg/m²     General:  Alert, cooperative, no distress, appears stated age, obese   Head:  Normocephalic, without obvious abnormality, atraumatic. Eyes:  Conjunctivae/corneas clear. Pupils equal, round, reactive to light. Extraocular movements intact. Lungs:   Clear to auscultation bilaterally, slightly diminished left side   Heart:  Regular rate and rhythm, S1, S2 normal, no murmur, click, rub, or gallop. Abdomen:   Soft, non-tender. Bowel sounds normal. No masses. No organomegaly. Extremities: Extremities normal, atraumatic, no cyanosis or edema. Pulses: 2+ and symmetric all extremities. Skin: Skin color, texture, turgor normal. No rashes or lesions. Lymph nodes: Cervical, supraclavicular, and axillary nodes normal.   Neurologic: CNII-XII intact.  Normal strength, sensation, and reflexes throughout. ECG: NSR    Data Review: All diagnostic labs and studies have been reviewed. Chest x-ray:   CXR Results  (Last 48 hours)               08/04/20 1711  XR CHEST PORT Final result    Impression:  IMPRESSION: No acute findings. Narrative:  EXAM: XR CHEST PORT       INDICATION: Chest Pain       COMPARISON: 7/29/2015 through 12/1/2014. FINDINGS: A portable AP radiograph of the chest was obtained at 17:12 hours. The   patient is on a cardiac monitor. The lungs are clear. The cardiac and   mediastinal contours and pulmonary vascularity are normal.  The chest wall   structures and visualized upper abdomen show no acute findings with incidental   note of degenerative spine and shoulder changes.                     Assessment:     Principal Problem:    RILEY (acute kidney injury) ([facility] (8/5/2020)    Active Problems:    Hypertension (12/1/2014)      Diabetes mellitus type 2, controlled ([facility] (5/12/2015)      Cocaine abuse ([facility] (6/7/2015)      Sepsis ([facility] (8/5/2020)      Hypotension (8/5/2020)      History of schizoaffective disorder (8/5/2020)        Plan:     Severe Sepsis: Unknown source (POA elevated lactic, tachycardia, hypotension, temp 99)  -Sepsis reassessment completed  -Lactic acid now NL  -Received 4L IVF in ED  -Received 1 dose zithromax and 1 dose ceftriaxone in ED (?CAP)  -BC done in ED; UA without bacteria/leukocytes but dirty, +blood-will repeat here so we can also culture  -Will go ahead and continue ceftriaxone for now  -BP remains soft, will give albumin q6h x3 doses (albumin low/normal on admission)  -COVID workup started in ED  -CXR clear, will repeat after massive fluid rescusitation    Hypotension  -d/t above  -rec 4L IVF in ED; will give albumin q6hx3 doses (alb on admission low/normal)  -Monitor BP/VS    RILEY: POA creat/gfr 4.93/15 (baseline normal kidney function)  -Received 3L IVF in ED per nursing  -recheck labs now, if no improvement, may need to consult nephrology    SI: history of same and schizoaffective d/o  -Resume latuda, zoloft  -hold trazadone for now with bp low  -suicide precautions, 1:1 sitter  -consult psych    DMII: A1c 6.7 6/2020  -hold home meds; BS low/normal  -AC/HS accuchecks with SSI  -hold lantus, trend BS  -CC diet    Hx HTN  -now hypotensive; hold home meds  -monitor VS/BP    Cocaine Abuse  -UDS + for cocaine  -counseled for cessation    Tobacco use  -1PPD x 30 years  -counseled for cessation  -nicotine patch ordered    DVTppx: SCDs, heparin  Gippx: NA  Code Status: Full code  Diet: Carb consistent/low sodium  Activity: OOB to chair TID and PRN; up ad morro  Discharge: TBD    Signed By: Hernan De Dios NP     August 5, 2020

## 2020-08-06 LAB
ANION GAP SERPL CALC-SCNC: 7 MMOL/L (ref 5–15)
BACTERIA SPEC CULT: NORMAL
BASOPHILS # BLD: 0 K/UL (ref 0–0.1)
BASOPHILS NFR BLD: 1 % (ref 0–1)
BUN SERPL-MCNC: 15 MG/DL (ref 6–20)
BUN/CREAT SERPL: 16 (ref 12–20)
CALCIUM SERPL-MCNC: 8.6 MG/DL (ref 8.5–10.1)
CHLORIDE SERPL-SCNC: 106 MMOL/L (ref 97–108)
CO2 SERPL-SCNC: 25 MMOL/L (ref 21–32)
CREAT SERPL-MCNC: 0.93 MG/DL (ref 0.7–1.3)
DIFFERENTIAL METHOD BLD: ABNORMAL
EOSINOPHIL # BLD: 0.2 K/UL (ref 0–0.4)
EOSINOPHIL NFR BLD: 5 % (ref 0–7)
ERYTHROCYTE [DISTWIDTH] IN BLOOD BY AUTOMATED COUNT: 12.4 % (ref 11.5–14.5)
GLUCOSE BLD STRIP.AUTO-MCNC: 100 MG/DL (ref 65–100)
GLUCOSE BLD STRIP.AUTO-MCNC: 100 MG/DL (ref 65–100)
GLUCOSE BLD STRIP.AUTO-MCNC: 101 MG/DL (ref 65–100)
GLUCOSE BLD STRIP.AUTO-MCNC: 112 MG/DL (ref 65–100)
GLUCOSE SERPL-MCNC: 90 MG/DL (ref 65–100)
HCT VFR BLD AUTO: 34.6 % (ref 36.6–50.3)
HGB BLD-MCNC: 11.5 G/DL (ref 12.1–17)
IMM GRANULOCYTES # BLD AUTO: 0 K/UL (ref 0–0.04)
IMM GRANULOCYTES NFR BLD AUTO: 0 % (ref 0–0.5)
LYMPHOCYTES # BLD: 1.9 K/UL (ref 0.8–3.5)
LYMPHOCYTES NFR BLD: 49 % (ref 12–49)
MAGNESIUM SERPL-MCNC: 1.6 MG/DL (ref 1.6–2.4)
MCH RBC QN AUTO: 30 PG (ref 26–34)
MCHC RBC AUTO-ENTMCNC: 33.2 G/DL (ref 30–36.5)
MCV RBC AUTO: 90.3 FL (ref 80–99)
MONOCYTES # BLD: 0.4 K/UL (ref 0–1)
MONOCYTES NFR BLD: 10 % (ref 5–13)
NEUTS SEG # BLD: 1.3 K/UL (ref 1.8–8)
NEUTS SEG NFR BLD: 35 % (ref 32–75)
NRBC # BLD: 0 K/UL (ref 0–0.01)
NRBC BLD-RTO: 0 PER 100 WBC
PHOSPHATE SERPL-MCNC: 2.3 MG/DL (ref 2.6–4.7)
PLATELET # BLD AUTO: 166 K/UL (ref 150–400)
PMV BLD AUTO: 9.6 FL (ref 8.9–12.9)
POTASSIUM SERPL-SCNC: 3.9 MMOL/L (ref 3.5–5.1)
RBC # BLD AUTO: 3.83 M/UL (ref 4.1–5.7)
SERVICE CMNT-IMP: ABNORMAL
SERVICE CMNT-IMP: ABNORMAL
SERVICE CMNT-IMP: NORMAL
SODIUM SERPL-SCNC: 138 MMOL/L (ref 136–145)
WBC # BLD AUTO: 3.8 K/UL (ref 4.1–11.1)

## 2020-08-06 PROCEDURE — 80048 BASIC METABOLIC PNL TOTAL CA: CPT

## 2020-08-06 PROCEDURE — 83735 ASSAY OF MAGNESIUM: CPT

## 2020-08-06 PROCEDURE — 82962 GLUCOSE BLOOD TEST: CPT

## 2020-08-06 PROCEDURE — 84100 ASSAY OF PHOSPHORUS: CPT

## 2020-08-06 PROCEDURE — 36415 COLL VENOUS BLD VENIPUNCTURE: CPT

## 2020-08-06 PROCEDURE — 85025 COMPLETE CBC W/AUTO DIFF WBC: CPT

## 2020-08-06 PROCEDURE — 74011000250 HC RX REV CODE- 250: Performed by: NURSE PRACTITIONER

## 2020-08-06 PROCEDURE — 74011250637 HC RX REV CODE- 250/637: Performed by: NURSE PRACTITIONER

## 2020-08-06 PROCEDURE — 65270000029 HC RM PRIVATE

## 2020-08-06 PROCEDURE — 74011250636 HC RX REV CODE- 250/636: Performed by: NURSE PRACTITIONER

## 2020-08-06 RX ORDER — IBUPROFEN 200 MG
1 TABLET ORAL DAILY
Qty: 30 PATCH | Refills: 0 | Status: ON HOLD | OUTPATIENT
Start: 2020-08-07 | End: 2020-08-10

## 2020-08-06 RX ADMIN — ASPIRIN 81 MG: 81 TABLET, COATED ORAL at 09:45

## 2020-08-06 RX ADMIN — ACETAMINOPHEN 650 MG: 325 TABLET ORAL at 13:08

## 2020-08-06 RX ADMIN — LURASIDONE HYDROCHLORIDE 80 MG: 80 TABLET, FILM COATED ORAL at 18:57

## 2020-08-06 RX ADMIN — HEPARIN SODIUM 5000 UNITS: 5000 INJECTION INTRAVENOUS; SUBCUTANEOUS at 04:22

## 2020-08-06 RX ADMIN — Medication 10 ML: at 22:00

## 2020-08-06 RX ADMIN — SERTRALINE HYDROCHLORIDE 100 MG: 50 TABLET ORAL at 09:45

## 2020-08-06 RX ADMIN — Medication 10 ML: at 06:00

## 2020-08-06 RX ADMIN — FAMOTIDINE 20 MG: 10 INJECTION, SOLUTION INTRAVENOUS at 09:45

## 2020-08-06 RX ADMIN — SODIUM CHLORIDE, SODIUM LACTATE, POTASSIUM CHLORIDE, AND CALCIUM CHLORIDE 100 ML/HR: 600; 310; 30; 20 INJECTION, SOLUTION INTRAVENOUS at 04:11

## 2020-08-06 RX ADMIN — ATORVASTATIN CALCIUM 10 MG: 10 TABLET, FILM COATED ORAL at 09:45

## 2020-08-06 NOTE — DISCHARGE INSTRUCTIONS
Discharge Instructions       PATIENT ID: Lillie Padilla  MRN: [id number]   YOB: 1960    DATE OF ADMISSION: 8/5/2020 12:17 AM    DATE OF DISCHARGE: 8/6/2020    PRIMARY CARE PROVIDER: Corine Rodarte MD     ATTENDING PHYSICIAN: Sharif Monroe MD  DISCHARGING PROVIDER: Yadira Gomez NP    To contact this individual call [phone] and ask the  to page. If unavailable ask to be transferred the Adult Hospitalist Department. DISCHARGE DIAGNOSES acute injury, sepsis, substance abuse    CONSULTATIONS: IP CONSULT TO PSYCHIATRY  IP CONSULT TO PSYCHIATRY    PROCEDURES/SURGERIES: * No surgery found *    PENDING TEST RESULTS:   At the time of discharge the following test results are still pending: ***    FOLLOW UP APPOINTMENTS:   Follow-up Information     Follow up With Specialties Details Why Contact Info    Corine Rodarte MD Family Medicine In 1 week post hospitalization follow up with in a week [address] [place] [address]  [phone]             ADDITIONAL CARE RECOMMENDATIONS: Take all medications as prescribed. DIET: Regular Diet    ACTIVITY: Activity as tolerated    WOUND CARE: none    EQUIPMENT needed: none      DISCHARGE MEDICATIONS:   See Medication Reconciliation Form    · It is important that you take the medication exactly as they are prescribed. · Keep your medication in the bottles provided by the pharmacist and keep a list of the medication names, dosages, and times to be taken in your wallet. · Do not take other medications without consulting your doctor. NOTIFY YOUR PHYSICIAN FOR ANY OF THE FOLLOWING:   Fever over 101 degrees for 24 hours. Chest pain, shortness of breath, fever, chills, nausea, vomiting, diarrhea, change in mentation, falling, weakness, bleeding. Severe pain or pain not relieved by medications. Or, any other signs or symptoms that you may have questions about.       DISPOSITION:    Home With:   OT  PT  [facility]  RN       SNF/Inpatient Rehab/LTAC    Independent/assisted living    Hospice   x Other: inpatient mental health     CDMP Checked:   Yes x     PROBLEM LIST Updated:  Yes x       Signed:   Alanis Whitfield NP  8/6/2020  6:19 PM

## 2020-08-06 NOTE — ROUTINE PROCESS
Bedside and Verbal shift change report given to Bobbetta Schirmer (oncoming nurse) by Luisito Fang (offgoing nurse). Report included the following information SBAR, Kardex, MAR and Recent Results.

## 2020-08-06 NOTE — ROUTINE PROCESS
Primary Nurse Nevaeh Danielle RN and Spencer Alcaraz RN performed a dual skin assessment on this patient No impairment noted Ray score is 21

## 2020-08-06 NOTE — DISCHARGE SUMMARY
Discharge Summary       PATIENT ID: Debbie Mcardle  MRN: [id number]   YOB: 1960    DATE OF ADMISSION: 8/5/2020 12:17 AM    DATE OF DISCHARGE: 08/06/2020   PRIMARY CARE PROVIDER: Ebony Gonzlaez MD     ATTENDING PHYSICIAN: Dr. Gerhardt Butler, MD  DISCHARGING PROVIDER: Alexsander Blankenship NP    To contact this individual call [phone] and ask the  to page. If unavailable ask to be transferred the Adult Hospitalist Department. CONSULTATIONS: IP CONSULT TO PSYCHIATRY  IP CONSULT TO PSYCHIATRY    PROCEDURES/SURGERIES: * No surgery found *    ADMITTING [facility] COURSE:     Debbie Mcardle is a 62 y.o. male with a past medical history significant for alcohol abuse, cocaine abuse, DM, hep C, hypertension, depression, schizo affective disorder admitted from [facility] ED with suicidal ideations and plan. Per ED notes, patient stated he wanted to throw a radio into the bathtub to electrocute himself.     Mr. Maribeth Opitz also complained of a cough with clear mucus production and shortness of breath. He also endorsed nausea and vomiting. His chest x-ray at [facility] was negative as was his COVID test.  Lab work revealed sepsis with no clear source. He was treated with sepsis protocol and started on both Zithromax and ceftriaxone. He is now resting comfortably with a one-to-one sitter in private room on suicide precaution. DISCHARGE DIAGNOSES / PLAN:      RILEY  · Resolved, Creat 4.6, now 0.9  · S/p 4L NS and [place]  · Azithromycin and ceftriaxone in ED  · Continue LR at 100 mL hour due to poor p.o. intake     Sepsis  · Lactic on admission 4.9, now 0.8  · CXR no clear etiology  · COVID results indeterminate. COVID -June 2020. No S/S, low suspicion  · U CX negative  · BCX no growth to date  · Ceftriaxone discontinued     Substance abuse  · UDS positive for cocaine     Nicotine dependence  · NicoDerm patch  · Counseling     PMH DM without hyperglycemia  · POC glucose AC/HS home WNL  · Hold metformin for now     PMH schizoaffective disorder  · Continue home Zoloft, Latuda  · Hold trazodone for now  · Psych reconsult for inpatient admission     PMH HTN  · Well-controlled at present  · Hold amlodipine, furosemide     MH HLD  · Home Lipitor     Indication for gabapentin unclear. Hold              PENDING TEST RESULTS:   At the time of discharge the following test results are still pending: none    FOLLOW UP APPOINTMENTS:    Follow-up Information     Follow up With Specialties Details Why Contact Info    Sherlyn Lucero MD Family Medicine In 1 week post hospitalization follow up with in a week [address] [place] [address]  [phone]               DIET: Regular Diet    ACTIVITY: Activity as tolerated    WOUND CARE: None    EQUIPMENT needed: None      DISCHARGE MEDICATIONS:  Current Discharge Medication List      START taking these medications    Details   nicotine (NICODERM CQ) 14 mg/24 hr patch 1 Patch by TransDERmal route daily for 30 days. Qty: 30 Patch, Refills: 0         CONTINUE these medications which have NOT CHANGED    Details   metFORMIN (GLUCOPHAGE) 1,000 mg tablet Take 1 Tab by mouth two (2) times daily (with meals). Indications: type 2 diabetes mellitus  Qty: 180 Tab, Refills: 3    Comments: long term pt  Associated Diagnoses: Controlled type 2 diabetes mellitus without complication, without long-term current use of insulin (HCC)      lurasidone (LATUDA) 80 mg tab tablet Take 1 Tab by mouth daily (with dinner). Indications: nerves and depression  Qty: 90 Tab, Refills: 3    Associated Diagnoses: Schizoaffective disorder, depressive type (HCC)      sertraline (ZOLOFT) 100 mg tablet Take 1 Tab by mouth daily. Indications: major depressive disorder  Qty: 90 Tab, Refills: 3    Associated Diagnoses: Schizoaffective disorder, depressive type (HCC)      gabapentin (NEURONTIN) 400 mg capsule Take 1 Cap by mouth three (3) times daily. Max Daily Amount: 1,200 mg.  Indications: neuropathic pain  Qty: 90 Cap, Refills: 2    Associated Diagnoses: Numbness and tingling of right arm; Schizoaffective disorder, depressive type (HCC)      furosemide (LASIX) 40 mg tablet Take 1 Tab by mouth daily. .  Indications: pressure and fluid  Qty: 90 Tab, Refills: 3    Associated Diagnoses: Essential hypertension      diclofenac (VOLTAREN) 1 % gel AAA in thin coat BID as needed for painful joints  Indications: joint damage causing pain and loss of function  Qty: 100 g, Refills: 11    Associated Diagnoses: Arthritis      traZODone (DESYREL) 100 mg tablet Take 1 Tab by mouth nightly. Indications: insomnia associated with depression  Qty: 30 Tab, Refills: 0      valsartan (DIOVAN) 160 mg tablet Take 160 mg by mouth daily. atorvastatin (LIPITOR) 10 mg tablet Take 1 Tab by mouth daily. Indications: prevent stroke  Qty: 90 Tab, Refills: 3    Associated Diagnoses: Mixed hyperlipidemia      amLODIPine (NORVASC) 5 mg tablet Take 1 Tab by mouth daily. Indications: pressure  Qty: 90 Tab, Refills: 3    Associated Diagnoses: Essential hypertension      aspirin delayed-release 81 mg tablet Take 1 Tab by mouth daily. Indications: prevent heart problem  Qty: 100 Tab, Refills: 1    Comments: Simeon pt  Associated Diagnoses: Essential hypertension               NOTIFY YOUR PHYSICIAN FOR ANY OF THE FOLLOWING:   Fever over 101 degrees for 24 hours. Chest pain, shortness of breath, fever, chills, nausea, vomiting, diarrhea, change in mentation, falling, weakness, bleeding. Severe pain or pain not relieved by medications. Or, any other signs or symptoms that you may have questions about.     DISPOSITION:    Home With:   OT  PT  HH  RN       Long term SNF/Inpatient Rehab    Independent/assisted living    Hospice   x Other: inpatient mental health       PATIENT CONDITION AT DISCHARGE:     Functional status    Poor     Deconditioned    x Independent      Cognition    x Lucid     Forgetful     Dementia      Catheters/lines (plus indication)    Rice     PICC     PEG    x None      Code status   x  Full code     DNR      PHYSICAL EXAMINATION AT DISCHARGE:    General:          Alert, cooperative, no distress, appears stated age. HEENT:           Atraumatic, anicteric sclerae, pink conjunctivae                          No oral ulcers, mucosa moist, throat clear, dentition fair  Neck:               Supple, symmetrical  Lungs:             Clear to auscultation bilaterally. No Wheezing or Rhonchi. No rales. Chest wall:      No tenderness  No Accessory muscle use. Heart:              Regular  rhythm,  No  murmur   No edema  Abdomen:        Soft, non-tender. Not distended. Bowel sounds normal  Extremities:     No cyanosis. No clubbing,                            Skin turgor normal, Capillary refill normal  Skin:                Not pale. Not Jaundiced  No rashes   Psych:             Not anxious or agitated.   Neurologic:      Alert, moves all extremities, answers questions appropriately and responds to commands       CHRONIC MEDICAL DIAGNOSES:  Problem List as of 8/6/2020 Date Reviewed: 8/5/2020          Codes Class Noted - Resolved    * (Principal) RILEY (acute kidney injury) ([facility] ICD-10-CM: N17.9  ICD-9-CM: 584.9  8/5/2020 - Present        Sepsis ([facility] ICD-10-CM: A41.9  ICD-9-CM: 038.9, 995.91  8/5/2020 - Present        Hypotension ICD-10-CM: I95.9  ICD-9-CM: 458.9  8/5/2020 - Present        History of schizoaffective disorder ICD-10-CM: Z86.59  ICD-9-CM: V11.0  8/5/2020 - Present        Acute renal failure (ARF) ([facility] ICD-10-CM: N17.9  ICD-9-CM: 584.9  8/5/2020 - Present        Substance induced mood disorder ([facility] ICD-10-CM: F19.94  ICD-9-CM: 292.84  6/13/2020 - Present        History of drug overdose ICD-10-CM: Z91.89  ICD-9-CM: V15.6  6/11/2020 - Present        Hepatitis C ICD-10-CM: B19.20  ICD-9-CM: 070.70  1/29/2016 - Present        Schizophrenia, chronic with acute exacerbation ([facility] ICD-10-CM: F20.9  ICD-9-CM: 295.94  6/7/2015 - Present        Cocaine abuse ([facility] ICD-10-CM: F14.10  ICD-9-CM: 305.60  6/7/2015 - Present        Alcohol-induced mental disorders ([facility] ICD-10-CM: L06.668  ICD-9-CM: 291.9  5/15/2015 - Present        Alcohol abuse ICD-10-CM: F10.10  ICD-9-CM: 305.00  5/12/2015 - Present        Diabetes mellitus type 2, controlled ([facility] ICD-10-CM: E11.9  ICD-9-CM: 250.00  5/12/2015 - Present        Chest pain ICD-10-CM: R07.9  ICD-9-CM: 786.50  12/1/2014 - Present        Numbness and tingling of right arm ICD-10-CM: R20.0, R20.2  ICD-9-CM: 782.0  12/1/2014 - Present        Hypertension ICD-10-CM: I10  ICD-9-CM: 401.9  12/1/2014 - Present        RESOLVED: Schizoaffective disorder, depressive type ([facility] ICD-10-CM: F25.1  ICD-9-CM: 295.70  6/7/2015 - 6/7/2015        RESOLVED: Depression, major, recurrent, mild ([place] [address].) ICD-10-CM: F33.0  ICD-9-CM: 296.31  6/6/2015 - 6/29/2020        RESOLVED: Unspecified psychosis ICD-10-CM: F29  ICD-9-CM: 298.9  5/12/2015 - 5/12/2015    Overview Signed 5/12/2015 11:45 AM by Pham Ron MD     VS: Substance induced psychosis VS: Schizophrenia             RESOLVED: Psychosis ([facility] ICD-10-CM: F29  ICD-9-CM: 298.9  5/12/2015 - 5/15/2015        RESOLVED: ACE-inhibitor cough (Chronic) ICD-10-CM: E67, T46.4X5A  ICD-9-CM: 786.2, E942.6  12/1/2014 - 6/29/2020              Greater than 35 minutes were spent with the patient on counseling and coordination of care    Patient will be discharged to inpatient psych unit.  D/w nursing staff     Signed:   Dima Castellanos NP  8/6/2020  6:24 PM

## 2020-08-06 NOTE — PROGRESS NOTES
Bedside shift change report given to Pete birch RN (oncoming nurse) by Marilu Valdovinos RN (offgoing nurse). Report included the following information SBAR, Kardex, MAR and Recent Results.

## 2020-08-06 NOTE — PROGRESS NOTES
[facility] Adult  Hospitalist Group                                                                                          Hospitalist Progress Note  Sharron Mcallister NP  Answering service: [phone] OR [phone] from in house phone        Date of Service:  2020  NAME:  Henry Santiago  :  1960  MRN:  [id number]      Admission Summary:   Henry Santiago is a 62 y.o. male with a past medical history significant for alcohol abuse, cocaine abuse, DM, hep C, hypertension, depression, schizo affective disorder admitted from [facility] ED with suicidal ideations and plan. Per ED notes, patient stated he wanted to throw a radio into the bathtub to electrocute himself. Mr. Rosi Montoya also complained of a cough with clear mucus production and shortness of breath. He also endorsed nausea and vomiting. His chest x-ray at [facility] was negative as was his COVID test.  Lab work revealed sepsis with no clear source. He was treated with sepsis protocol and started on both Zithromax and ceftriaxone. He is now resting comfortably with a one-to-one sitter in private room on suicide precaution. Interval history / Subjective:   Awake, alert, oriented x3. Very flat affect affect. Not eating well. Denies nausea, denies pain, \"I am just not hungry\". Continues to endorse suicidal ideation with plan. Agreeable to inpatient mental health admission. Medically stable now. Psych reconsulted to consider inpatient admission. Assessment & Plan: RILEY  · Resolved, Creat 4.6, now 0.9  · S/p 4L NS and [place]  · Azithromycin and ceftriaxone in ED  · Continue LR at 100 mL hour due to poor p.o. intake    Sepsis  · Lactic on admission 4.9, now 0.8  · CXR no clear etiology  · COVID results indeterminate. COVID -2020. No S/S, low suspicion  · U CX negative  · BCX no growth to date  · Ceftriaxone discontinued    Substance abuse  · UDS positive for cocaine    Nicotine dependence  · NicoDerm patch  · Counseling    PMH DM without hyperglycemia  · POC glucose AC/HS home WNL  · Hold metformin for now    PMH schizoaffective disorder  · Continue home Zoloft, Latuda  · Hold trazodone for now  · Psych reconsult for inpatient admission    PMH HTN  · Well-controlled at present  · Hold amlodipine, furosemide    MH HLD  · Home Lipitor    Indication for gabapentin unclear. Hold    Code status: Full  DVT prophylaxis: SQ Heparin  Activity: [address] discussed with: Patient/Family Brother Eric García [phone]  Anticipated Disposition: inpt   Anticipated Discharge: Greater than 48 hours     Hospital Problems  Date Reviewed: 8/5/2020          Codes Class Noted POA    * (Principal) RILEY (acute kidney injury) ([facility] ICD-10-CM: N17.9  ICD-9-CM: 584.9  8/5/2020 Yes        Sepsis ([facility] ICD-10-CM: A41.9  ICD-9-CM: 038.9, 995.91  8/5/2020 Yes        Hypotension ICD-10-CM: I95.9  ICD-9-CM: 458.9  8/5/2020 Yes        History of schizoaffective disorder ICD-10-CM: Z86.59  ICD-9-CM: V11.0  8/5/2020 Yes        Acute renal failure (ARF) ([facility] ICD-10-CM: N17.9  ICD-9-CM: 584.9  8/5/2020 Unknown        Cocaine abuse ([facility] ICD-10-CM: F14.10  ICD-9-CM: 305.60  6/7/2015 Yes        Diabetes mellitus type 2, controlled ([facility] ICD-10-CM: E11.9  ICD-9-CM: 250.00  5/12/2015 Yes        Hypertension ICD-10-CM: I10  ICD-9-CM: 401.9  12/1/2014 Yes                Review of Systems:   Review of Systems   Constitutional: Positive for malaise/fatigue. Negative for chills and fever. HENT: Negative. Eyes: Negative. Respiratory: Negative for cough, shortness of breath and wheezing. Cardiovascular: Negative for chest pain, palpitations, orthopnea and claudication. Gastrointestinal: Negative for abdominal pain, nausea and vomiting. Genitourinary: Negative. Musculoskeletal: Negative. Skin: Negative. Neurological: Negative. Psychiatric/Behavioral: Positive for depression, substance abuse and suicidal ideas.  The patient is not nervous/anxious and does not have insomnia. Vital Signs:    Last 24hrs VS reviewed since prior progress note. Most recent are:  Visit Vitals  /79   Pulse 72   Temp 98.1 °F (36.7 °C)   Resp 16   Ht 5' 9\" (1.753 m)   Wt 92.6 kg (204 lb 2.3 oz)   SpO2 98%   BMI 30.15 kg/m²       No intake or output data in the 24 hours ending 08/06/20 1448     Physical Examination:             Constitutional:  No acute distress, flat affect, minimally communicative   ENT:  Oral mucosa moist, oropharynx benign. Resp:  CTA bilaterally. No wheezing/rhonchi/rales. No accessory muscle use   CV:  Regular rhythm, normal rate, no murmurs, gallops, rubs    GI:  Soft, non distended, non tender. normoactive bowel sounds, no hepatosplenomegaly     Musculoskeletal:  No edema, warm, 2+ pulses throughout    Neurologic:  Moves all extremities. AAOx3, CN II-XII reviewed            Data Review:    Review and/or order of clinical lab test      Labs:     Recent Labs     08/06/20 0437 08/05/20 0120   WBC 3.8* 7.4   HGB 11.5* 11.6*   HCT 34.6* 35.1*    178     Recent Labs     08/06/20 0437 08/05/20 0120 08/04/20  1717    138 137   K 3.9 4.0 4.0    106 97   CO2 25 21 20*   BUN 15 26* 31*   CREA 0.93 2.10* 4.93*   GLU 90 110* 120*   CA 8.6 8.7 8.6   MG 1.6 1.7 1.7   PHOS 2.3* 3.8  --      Recent Labs     08/05/20 0120 08/04/20  1717   ALT 32 33   AP 60 62   TBILI 0.3 0.3   TP 6.6 6.9   ALB 3.3* 3.7   GLOB 3.3 3.2     No results for input(s): INR, PTP, APTT, INREXT in the last 72 hours. No results for input(s): FE, TIBC, PSAT, FERR in the last 72 hours. No results found for: FOL, RBCF   No results for input(s): PH, PCO2, PO2 in the last 72 hours.   Recent Labs     08/04/20  1717   TROIQ <0.05     Lab Results   Component Value Date/Time    Cholesterol, total 169 06/16/2020 04:01 AM    HDL Cholesterol 42 06/16/2020 04:01 AM    LDL, calculated 96.2 06/16/2020 04:01 AM    Triglyceride 154 (H) 06/16/2020 04:01 AM CHOL/HDL Ratio 4.0 06/16/2020 04:01 AM     Lab Results   Component Value Date/Time    Glucose (POC) 100 08/06/2020 11:03 AM    Glucose (POC) 101 (H) 08/06/2020 06:15 AM    Glucose (POC) 98 08/05/2020 09:13 PM    Glucose (POC) 111 (H) 08/05/2020 05:44 PM    Glucose (POC) 116 (H) 08/05/2020 11:25 AM     Lab Results   Component Value Date/Time    Color YELLOW/STRAW 08/05/2020 04:37 AM    Appearance CLEAR 08/05/2020 04:37 AM    Specific gravity 1.012 08/05/2020 04:37 AM    Specific gravity 1.020 08/04/2020 06:40 PM    pH (UA) 5.0 08/05/2020 04:37 AM    Protein Negative 08/05/2020 04:37 AM    Glucose Negative 08/05/2020 04:37 AM    Ketone Negative 08/05/2020 04:37 AM    Bilirubin Negative 08/05/2020 04:37 AM    Urobilinogen 0.2 08/05/2020 04:37 AM    Nitrites Negative 08/05/2020 04:37 AM    Leukocyte Esterase Negative 08/05/2020 04:37 AM    Epithelial cells FEW 08/05/2020 04:37 AM    Bacteria Negative 08/05/2020 04:37 AM    WBC 0-4 08/05/2020 04:37 AM    RBC 0-5 08/05/2020 04:37 AM         Medications Reviewed:     Current Facility-Administered Medications   Medication Dose Route Frequency    sodium chloride (NS) flush 5-40 mL  5-40 mL IntraVENous Q8H    sodium chloride (NS) flush 5-40 mL  5-40 mL IntraVENous PRN    acetaminophen (TYLENOL) tablet 650 mg  650 mg Oral Q6H PRN    Or    acetaminophen (TYLENOL) suppository 650 mg  650 mg Rectal Q6H PRN    polyethylene glycol (MIRALAX) packet 17 g  17 g Oral DAILY PRN    promethazine (PHENERGAN) tablet 12.5 mg  12.5 mg Oral Q6H PRN    Or    ondansetron (ZOFRAN) injection 4 mg  4 mg IntraVENous Q6H PRN    famotidine (PF) (PEPCID) 20 mg in 0.9% sodium chloride 10 mL injection  20 mg IntraVENous DAILY    nicotine (NICODERM CQ) 14 mg/24 hr patch 1 Patch  1 Patch TransDERmal DAILY    aspirin delayed-release tablet 81 mg  81 mg Oral DAILY    atorvastatin (LIPITOR) tablet 10 mg  10 mg Oral DAILY    lurasidone (LATUDA) tablet 80 mg  80 mg Oral DAILY WITH DINNER    sertraline (ZOLOFT) tablet 100 mg  100 mg Oral DAILY    glucose chewable tablet 16 g  4 Tab Oral PRN    glucagon (GLUCAGEN) injection 1 mg  1 mg IntraMUSCular PRN    dextrose 10% infusion 0-250 mL  0-250 mL IntraVENous PRN    insulin lispro (HUMALOG) injection   SubCUTAneous AC&HS    heparin (porcine) injection 5,000 Units  5,000 Units SubCUTAneous Q12H    lactated Ringers infusion  100 mL/hr IntraVENous CONTINUOUS     ______________________________________________________________________  EXPECTED LENGTH OF STAY: - - -  ACTUAL LENGTH OF STAY:          1                 Sharon Loyd NP

## 2020-08-07 LAB
GLUCOSE BLD STRIP.AUTO-MCNC: 100 MG/DL (ref 65–100)
GLUCOSE BLD STRIP.AUTO-MCNC: 118 MG/DL (ref 65–100)
GLUCOSE BLD STRIP.AUTO-MCNC: 122 MG/DL (ref 65–100)
GLUCOSE BLD STRIP.AUTO-MCNC: 98 MG/DL (ref 65–100)
SERVICE CMNT-IMP: ABNORMAL
SERVICE CMNT-IMP: ABNORMAL
SERVICE CMNT-IMP: NORMAL
SERVICE CMNT-IMP: NORMAL

## 2020-08-07 PROCEDURE — 82962 GLUCOSE BLOOD TEST: CPT

## 2020-08-07 PROCEDURE — 94760 N-INVAS EAR/PLS OXIMETRY 1: CPT

## 2020-08-07 PROCEDURE — 74011250637 HC RX REV CODE- 250/637: Performed by: NURSE PRACTITIONER

## 2020-08-07 PROCEDURE — 74011250636 HC RX REV CODE- 250/636: Performed by: NURSE PRACTITIONER

## 2020-08-07 PROCEDURE — 74011000250 HC RX REV CODE- 250: Performed by: NURSE PRACTITIONER

## 2020-08-07 PROCEDURE — 65270000029 HC RM PRIVATE

## 2020-08-07 PROCEDURE — 87635 SARS-COV-2 COVID-19 AMP PRB: CPT

## 2020-08-07 RX ORDER — FAMOTIDINE 20 MG/1
20 TABLET, FILM COATED ORAL 2 TIMES DAILY
Status: DISCONTINUED | OUTPATIENT
Start: 2020-08-08 | End: 2020-08-08 | Stop reason: HOSPADM

## 2020-08-07 RX ADMIN — LURASIDONE HYDROCHLORIDE 80 MG: 80 TABLET, FILM COATED ORAL at 16:08

## 2020-08-07 RX ADMIN — Medication 10 ML: at 21:05

## 2020-08-07 RX ADMIN — ASPIRIN 81 MG: 81 TABLET, COATED ORAL at 13:10

## 2020-08-07 RX ADMIN — SERTRALINE HYDROCHLORIDE 100 MG: 50 TABLET ORAL at 13:10

## 2020-08-07 RX ADMIN — FAMOTIDINE 20 MG: 10 INJECTION, SOLUTION INTRAVENOUS at 13:09

## 2020-08-07 RX ADMIN — Medication 10 ML: at 06:00

## 2020-08-07 RX ADMIN — ATORVASTATIN CALCIUM 10 MG: 10 TABLET, FILM COATED ORAL at 13:10

## 2020-08-07 RX ADMIN — Medication 10 ML: at 13:11

## 2020-08-07 NOTE — ROUTINE PROCESS
Bedside and Verbal shift change report given to Krissy Swan (oncoming nurse) by Rachel Isaacs (offgoing nurse). Report included the following information SBAR, Kardex, Intake/Output, MAR and Recent Results.

## 2020-08-07 NOTE — PROGRESS NOTES
Bedside shift change report given to Avelino Sever, RN (oncoming nurse) by Mehdi John RN (offgoing nurse). Report included the following information SBAR, Kardex, Intake/Output, MAR and Recent Results.

## 2020-08-07 NOTE — ROUTINE PROCESS
The following appointments have been successfully scheduled: 
 
Date/time Monday, August 17, 2020 04:00 PM 
Patient Mariana Canal 1960 ([id number] Department [facility] OFFICE Appointment type Transitional Care Provider Tip May This was the earliest available appointment I could scheduled for the patient. [facility] does not service the patients home address.

## 2020-08-07 NOTE — ADT AUTH CERT NOTES
PER MESSAGE FROM NURSE: 
 
This patient is being discharged to Inpatient psych at [facility]. No new consults or MD progress notes available since my last review yesterday and no new instability/meds/imaging/labs to speak of. My physician advisor reviewed this case yesterday and recommended to keep Inpatient. Unfortunately, I don't have anything to give you other than what has been submitted. The psych consult was included in my initial review. Facility Name: [address]           
  
  
  
  
  
   
Patient Demographics Patient Name Naya Fermin Male    
1960  Address [address] [address]  Phone [phone] (Home) [phone] (Mobile) *Preferred* Hospital Account Name  Acct ID  Class  Status  Primary Coverage Naya Munguia  [id number]  INPATIENT  Open  [employer] MEDICARE - [address] HMO   
  
    
Guarantor Account (for Hospital Account [de-identified]) Name  Relation to Pt  Service Area  Active? Acct Type Naya Munguia  Self  BONSC  Yes  Personal/Family Address  Phone [address]  [phone](N)     
  
    
Coverage Information (for Hospital Account [de-identified]) 1. HU[employer]NA MEDICARE/BSHSI HU[employer]NA MEDICARE HMO  
 
F/O Payor/Plan  Subscriber   Subscriber Sex  Precert # [address] [facility]  62  M Subscriber  Subscriber # Naya Munguia  [id number] Grp #  Group Name [id number]  EUKOGG [id number] Address  Phone [address] [address] [address]  [phone] Policy Number  Status  Effective Date  Benefits Phone [id number]  -   - Auth/Cert REF# [id number] 2. [place] [facility]  
 
F/O Payor/Plan  Subscriber   Subscriber Sex  Precert # [place]  62  M Subscriber  Subscriber # Naya Munguia  [id number] Grp #  Group Name MEDICAID Address  Phone [id number] Rona Morgan Policy Number  Status  Effective Date  Benefits Phone [id number]  -   - Auth/Cert LESLIE   
  
    
Admission Information Arrival Date/Time:   Admit Date/Time:  08/05/2020 0017  IP Adm. Date/Time:  08/05/2020 1913 Admission Type:  Urgent  Point of Origin:  Other Acute Hospital  Admit Category:    
Means of Arrival:   Primary Service:  Medicine  Secondary Service:  N/A Transfer Source:   Service Area:  [address] [place] [address]  Unit:  [facility] 5E1 SURGICAL UNIT Admit Provider:  Juan F Belcher DO  Attending Provider:  Carlos Benitez MD  Referring Provider:    
Admission Information Attending Provider  Admission Dx  Admitted on   
Camryn Gamboa MD  RILEY (acute kidney injury) [facility]), Acute renal failure (ARF) ([facility]  08/05/20 Service  Isolation  Code Status MEDICINE   Full Code Allergies  Advance Care Planning Tramadol, Lisinopril  Jump to the Activity     
Admission Information Unit/Bed:  [facility] 5E1 SURGICAL UNIT/  Service:  MEDICINE Admitting provider:  Juan F Belcher DO  Phone:  [phone] Attending provider:  Camryn Gamboa MD  Phone:  [phone] PCP:  Randolph Collins MD  Phone:  [phone] Admission dx:  acute kidney failure  Patient class:  I Admission type:  UR

## 2020-08-07 NOTE — PROGRESS NOTES
Clinical Pharmacy Note: IV to PO Automatic Conversion  Please note: Danie Galla medication (famotidine) has been changed from IV to PO based on the following critiera:    Patient is taking scheduled oral medications  Patient is tolerating tube feeds at goal rate or a full liquid, soft or regular diet    This IV to PO conversion is based on the P&T approved automatic conversion policy for eligible patients. Please call with questions.

## 2020-08-07 NOTE — CONSULTS
Patient reevaluated this evening by psychiatry at request of hospitalist for [facility] admission upon medical clearance. There have been no changes since initial psychiatric evaluation yesterday. Recommendation to admit patient to [facility]. Please contact bed board for admission to [facility] and admitting provider.

## 2020-08-08 ENCOUNTER — HOSPITAL ENCOUNTER (INPATIENT)
Age: 60
LOS: 3 days | Discharge: HOME | DRG: 885 | End: 2020-08-11
Attending: PSYCHIATRY & NEUROLOGY | Admitting: PSYCHIATRY & NEUROLOGY
Payer: MEDICARE

## 2020-08-08 VITALS
SYSTOLIC BLOOD PRESSURE: 139 MMHG | RESPIRATION RATE: 15 BRPM | HEART RATE: 82 BPM | TEMPERATURE: 98.1 F | DIASTOLIC BLOOD PRESSURE: 88 MMHG | OXYGEN SATURATION: 97 % | BODY MASS INDEX: 30.24 KG/M2 | WEIGHT: 204.15 LBS | HEIGHT: 69 IN

## 2020-08-08 DIAGNOSIS — I10 ESSENTIAL HYPERTENSION: ICD-10-CM

## 2020-08-08 PROBLEM — F25.9 SCHIZOAFFECTIVE DISORDER (HCC): Status: ACTIVE | Noted: 2020-08-08

## 2020-08-08 LAB
GLUCOSE BLD STRIP.AUTO-MCNC: 109 MG/DL (ref 65–100)
GLUCOSE BLD STRIP.AUTO-MCNC: 114 MG/DL (ref 65–100)
GLUCOSE BLD STRIP.AUTO-MCNC: 117 MG/DL (ref 65–100)
HEALTH STATUS, XMCV2T: NORMAL
SARS-COV-2, COV2: NOT DETECTED
SERVICE CMNT-IMP: ABNORMAL
SOURCE, COVRS: NORMAL
SPECIMEN SOURCE, FCOV2M: NORMAL
SPECIMEN TYPE, XMCV1T: NORMAL

## 2020-08-08 PROCEDURE — 74011250637 HC RX REV CODE- 250/637: Performed by: NURSE PRACTITIONER

## 2020-08-08 PROCEDURE — 65220000003 HC RM SEMIPRIVATE PSYCH

## 2020-08-08 PROCEDURE — 82962 GLUCOSE BLOOD TEST: CPT

## 2020-08-08 RX ORDER — MAGNESIUM SULFATE 100 %
4 CRYSTALS MISCELLANEOUS AS NEEDED
Status: DISCONTINUED | OUTPATIENT
Start: 2020-08-08 | End: 2020-08-11 | Stop reason: HOSPADM

## 2020-08-08 RX ORDER — BENZTROPINE MESYLATE 1 MG/1
1 TABLET ORAL
Status: DISCONTINUED | OUTPATIENT
Start: 2020-08-08 | End: 2020-08-11 | Stop reason: HOSPADM

## 2020-08-08 RX ORDER — TRAZODONE HYDROCHLORIDE 50 MG/1
50 TABLET ORAL
Status: DISCONTINUED | OUTPATIENT
Start: 2020-08-08 | End: 2020-08-11 | Stop reason: HOSPADM

## 2020-08-08 RX ORDER — ATORVASTATIN CALCIUM 10 MG/1
10 TABLET, FILM COATED ORAL DAILY
Status: DISCONTINUED | OUTPATIENT
Start: 2020-08-09 | End: 2020-08-09

## 2020-08-08 RX ORDER — ADHESIVE BANDAGE
30 BANDAGE TOPICAL DAILY PRN
Status: DISCONTINUED | OUTPATIENT
Start: 2020-08-08 | End: 2020-08-11 | Stop reason: HOSPADM

## 2020-08-08 RX ORDER — INSULIN LISPRO 100 [IU]/ML
INJECTION, SOLUTION INTRAVENOUS; SUBCUTANEOUS
Status: DISCONTINUED | OUTPATIENT
Start: 2020-08-09 | End: 2020-08-11 | Stop reason: HOSPADM

## 2020-08-08 RX ORDER — OLANZAPINE 5 MG/1
5 TABLET ORAL
Status: DISCONTINUED | OUTPATIENT
Start: 2020-08-08 | End: 2020-08-11 | Stop reason: HOSPADM

## 2020-08-08 RX ORDER — SERTRALINE HYDROCHLORIDE 50 MG/1
100 TABLET, FILM COATED ORAL DAILY
Status: DISCONTINUED | OUTPATIENT
Start: 2020-08-09 | End: 2020-08-09

## 2020-08-08 RX ORDER — IBUPROFEN 200 MG
1 TABLET ORAL DAILY
Status: DISCONTINUED | OUTPATIENT
Start: 2020-08-09 | End: 2020-08-11 | Stop reason: HOSPADM

## 2020-08-08 RX ORDER — FAMOTIDINE 20 MG/1
20 TABLET, FILM COATED ORAL 2 TIMES DAILY
Status: DISCONTINUED | OUTPATIENT
Start: 2020-08-09 | End: 2020-08-11 | Stop reason: HOSPADM

## 2020-08-08 RX ORDER — INSULIN LISPRO 100 [IU]/ML
INJECTION, SOLUTION INTRAVENOUS; SUBCUTANEOUS
Status: DISCONTINUED | OUTPATIENT
Start: 2020-08-08 | End: 2020-08-08

## 2020-08-08 RX ORDER — GUAIFENESIN 100 MG/5ML
81 LIQUID (ML) ORAL DAILY
Status: DISCONTINUED | OUTPATIENT
Start: 2020-08-09 | End: 2020-08-11 | Stop reason: HOSPADM

## 2020-08-08 RX ORDER — LORAZEPAM 2 MG/ML
1 INJECTION INTRAMUSCULAR
Status: DISCONTINUED | OUTPATIENT
Start: 2020-08-08 | End: 2020-08-11 | Stop reason: HOSPADM

## 2020-08-08 RX ORDER — DIPHENHYDRAMINE HYDROCHLORIDE 50 MG/ML
50 INJECTION, SOLUTION INTRAMUSCULAR; INTRAVENOUS
Status: DISCONTINUED | OUTPATIENT
Start: 2020-08-08 | End: 2020-08-11 | Stop reason: HOSPADM

## 2020-08-08 RX ORDER — HYDROXYZINE 50 MG/1
50 TABLET, FILM COATED ORAL
Status: DISCONTINUED | OUTPATIENT
Start: 2020-08-08 | End: 2020-08-11 | Stop reason: HOSPADM

## 2020-08-08 RX ORDER — HALOPERIDOL 5 MG/ML
5 INJECTION INTRAMUSCULAR
Status: DISCONTINUED | OUTPATIENT
Start: 2020-08-08 | End: 2020-08-11 | Stop reason: HOSPADM

## 2020-08-08 RX ORDER — ACETAMINOPHEN 325 MG/1
650 TABLET ORAL
Status: DISCONTINUED | OUTPATIENT
Start: 2020-08-08 | End: 2020-08-11 | Stop reason: HOSPADM

## 2020-08-08 RX ADMIN — Medication 10 ML: at 14:00

## 2020-08-08 RX ADMIN — SERTRALINE HYDROCHLORIDE 100 MG: 50 TABLET ORAL at 10:31

## 2020-08-08 RX ADMIN — FAMOTIDINE 20 MG: 20 TABLET ORAL at 10:31

## 2020-08-08 RX ADMIN — Medication 10 ML: at 06:29

## 2020-08-08 RX ADMIN — LURASIDONE HYDROCHLORIDE 80 MG: 80 TABLET, FILM COATED ORAL at 18:12

## 2020-08-08 RX ADMIN — FAMOTIDINE 20 MG: 20 TABLET ORAL at 18:12

## 2020-08-08 RX ADMIN — ATORVASTATIN CALCIUM 10 MG: 10 TABLET, FILM COATED ORAL at 10:31

## 2020-08-08 RX ADMIN — ASPIRIN 81 MG: 81 TABLET, COATED ORAL at 10:31

## 2020-08-08 NOTE — PROGRESS NOTES
Bedside shift change report given to [facility] NICHOLAS MESA and Myra Sneed RN (oncoming nurse) by Taryn Rodriguez RN (offgoing nurse). Report included the following information SBAR, Kardex, Intake/Output and MAR.

## 2020-08-09 LAB
GLUCOSE BLD STRIP.AUTO-MCNC: 126 MG/DL (ref 65–100)
GLUCOSE BLD STRIP.AUTO-MCNC: 161 MG/DL (ref 65–100)
GLUCOSE BLD STRIP.AUTO-MCNC: 91 MG/DL (ref 65–100)
GLUCOSE BLD STRIP.AUTO-MCNC: 99 MG/DL (ref 65–100)
SERVICE CMNT-IMP: ABNORMAL
SERVICE CMNT-IMP: ABNORMAL
SERVICE CMNT-IMP: NORMAL
SERVICE CMNT-IMP: NORMAL

## 2020-08-09 PROCEDURE — 74011636637 HC RX REV CODE- 636/637: Performed by: NURSE PRACTITIONER

## 2020-08-09 PROCEDURE — 74011250637 HC RX REV CODE- 250/637: Performed by: NURSE PRACTITIONER

## 2020-08-09 PROCEDURE — 82962 GLUCOSE BLOOD TEST: CPT

## 2020-08-09 PROCEDURE — 65220000003 HC RM SEMIPRIVATE PSYCH

## 2020-08-09 RX ORDER — SERTRALINE HYDROCHLORIDE 50 MG/1
150 TABLET, FILM COATED ORAL DAILY
Status: DISCONTINUED | OUTPATIENT
Start: 2020-08-10 | End: 2020-08-11 | Stop reason: HOSPADM

## 2020-08-09 RX ORDER — METFORMIN HYDROCHLORIDE 500 MG/1
1000 TABLET ORAL 2 TIMES DAILY WITH MEALS
Status: DISCONTINUED | OUTPATIENT
Start: 2020-08-09 | End: 2020-08-11 | Stop reason: HOSPADM

## 2020-08-09 RX ORDER — AMLODIPINE BESYLATE 5 MG/1
5 TABLET ORAL DAILY
Status: DISCONTINUED | OUTPATIENT
Start: 2020-08-09 | End: 2020-08-11 | Stop reason: HOSPADM

## 2020-08-09 RX ORDER — LOSARTAN POTASSIUM 50 MG/1
100 TABLET ORAL DAILY
Status: DISCONTINUED | OUTPATIENT
Start: 2020-08-09 | End: 2020-08-11 | Stop reason: HOSPADM

## 2020-08-09 RX ORDER — ATORVASTATIN CALCIUM 10 MG/1
10 TABLET, FILM COATED ORAL
Status: DISCONTINUED | OUTPATIENT
Start: 2020-08-10 | End: 2020-08-11 | Stop reason: HOSPADM

## 2020-08-09 RX ADMIN — ASPIRIN 81 MG CHEWABLE TABLET 81 MG: 81 TABLET CHEWABLE at 07:47

## 2020-08-09 RX ADMIN — ATORVASTATIN CALCIUM 10 MG: 10 TABLET, FILM COATED ORAL at 07:47

## 2020-08-09 RX ADMIN — METFORMIN HYDROCHLORIDE 1000 MG: 500 TABLET ORAL at 11:44

## 2020-08-09 RX ADMIN — FAMOTIDINE 20 MG: 20 TABLET ORAL at 17:55

## 2020-08-09 RX ADMIN — METFORMIN HYDROCHLORIDE 1000 MG: 500 TABLET ORAL at 17:55

## 2020-08-09 RX ADMIN — LURASIDONE HYDROCHLORIDE 120 MG: 80 TABLET, FILM COATED ORAL at 17:55

## 2020-08-09 RX ADMIN — LOSARTAN POTASSIUM 100 MG: 50 TABLET, FILM COATED ORAL at 11:44

## 2020-08-09 RX ADMIN — SERTRALINE HYDROCHLORIDE 100 MG: 50 TABLET ORAL at 07:47

## 2020-08-09 RX ADMIN — INSULIN LISPRO 2 UNITS: 100 INJECTION, SOLUTION INTRAVENOUS; SUBCUTANEOUS at 11:56

## 2020-08-09 RX ADMIN — AMLODIPINE BESYLATE 5 MG: 5 TABLET ORAL at 11:44

## 2020-08-09 RX ADMIN — FAMOTIDINE 20 MG: 20 TABLET ORAL at 07:47

## 2020-08-09 NOTE — PROGRESS NOTES
Pt out on unit. Quiet, but calm and cooperative. Med and meal compliant. Problem: Falls - Risk of  Goal: *Absence of Falls  Description: Document Devere [place] Fall Risk and appropriate interventions in the flowsheet.   Outcome: Progressing Towards Goal  Note: Fall Risk Interventions:            Medication Interventions: Teach patient to arise slowly         History of Falls Interventions: Door open when patient unattended         Problem: Depressed Mood (Adult/Pediatric)  Goal: *STG: Participates in treatment plan  Outcome: Progressing Towards Goal

## 2020-08-09 NOTE — PROGRESS NOTES
[facility] 60  Master Treatment Plan for Benedict Dominguez    Date Treatment Plan Initiated: 8/9/20    Treatment Plan Modalities:  Type of Modality Amount  (x minutes) Frequency (x/week) Duration (x days) Name of Responsible Staff   [address] [facility] meetings to encourage peer interactions 15 7 1   CAROLYN Rao   Group psychotherapy to assist in building coping skills and internal controls 60 7 1 Robert Muhammad   Therapeutic activity groups to build coping skills 60 7 1 Robert Muhammad   Psychoeducation in group setting to address:   Medication education   Andrea Coreas RN   Coping skills         Relaxation techniques         Symptom management         Discharge planning   60 2 255 [facility]    60 2 1 [address] [facility]   60 1 1 [facility]/AA/NA         Physician medication management   13 7 1 Dr. Hemalatha Wade meeting/discharge planning   13 2 1400 [facility] and Kinga Moody                                Goal will be met by 8/12/20:    Problem: Falls - Risk of  Goal: *Absence of Falls  Description: Document [place] Fall Risk and appropriate interventions in the flowsheet. Outcome: Progressing Towards Goal  Note: Fall Risk Interventions:            Medication Interventions: Teach patient to arise slowly         History of Falls Interventions: Door open when patient unattended         Problem: Patient Education: Go to Patient Education Activity  Goal: Patient/Family Education  Outcome: Progressing Towards Goal     Problem: Depressed Mood (Adult/Pediatric)  Goal: *STG: Participates in treatment plan  Outcome: Progressing Towards Goal  Note: Pt out on the unit with little interaction with peers. Took a shower during the shift. Goal: *STG: Verbalizes anger, guilt, and other feelings in a constructive manor  Outcome: Progressing Towards Goal  Note: Continues to feel depressed at this time.    Goal: *STG: Attends activities and groups  Outcome: Progressing Towards Goal  Note: Encouraged pt to attend groups and express feelings and concerns. Goal: *STG: Demonstrates reduction in symptoms and increase in insight into coping skills/future focused  Outcome: Progressing Towards Goal  Note: Encouraged pt to use positive coping skills. Goal: *STG: Complies with medication therapy  Outcome: Progressing Towards Goal  Note: Taking medications as scheduled. Goal: Interventions  Outcome: Progressing Towards Goal     Problem: Patient Education: Go to Patient Education Activity  Goal: Patient/Family Education  Outcome: Progressing Towards Goal     Problem: Diabetes Self-Management  Goal: *Monitoring blood glucose, interpreting and using results  Description: Identify recommended blood glucose targets  and personal targets. Outcome: Progressing Towards Goal  Note: Blood glucose monitoring AC & HS.       Problem: Patient Education: Go to Patient Education Activity  Goal: Patient/Family Education  Outcome: Progressing Towards Goal

## 2020-08-09 NOTE — PROGRESS NOTES
Pt appears to be sleeping. NAD. Respirations even and unlabored. Will continue to monitor q15 for safety. Problem: Falls - Risk of  Goal: *Absence of Falls  Description: Document Rita Hurtado Fall Risk and appropriate interventions in the flowsheet.   Outcome: Progressing Towards Goal  Note: Fall Risk Interventions:                      History of Falls Interventions: Door open when patient unattended

## 2020-08-09 NOTE — BH NOTES
TRANSFER - IN REPORT:    Verbal report received from [facility]  being received from [facility] routine progression of care      Report consisted of patients Situation, Background, Assessment and   Recommendations(SBAR). Information from the following report(s) SBAR was reviewed with the receiving nurse. Opportunity for questions and clarification was provided. Assessment completed upon patients arrival to unit and care assumed.

## 2020-08-09 NOTE — INTERDISCIPLINARY ROUNDS
Behavioral Health Interdisciplinary Rounds Patient Name: Henry Santiago  Age: 62 y.o. Room/Bed:  733/01 Primary Diagnosis: <principal problem not specified> Admission Status: Voluntary Readmission within 30 days: no 
Power of  in place: no 
Patient requires a blocked bed: no          Reason for blocked bed: VTE Prophylaxis: No 
 
Mobility needs/Fall risk: no 
Flu Vaccine : no  
Nutritional Plan: no 
Consults:         
Labs/Testing due today?: no 
 
Sleep hours:  4 Participation in Care/Groups:  yes Medication Compliant?: Yes PRNS (last 24 hours): None Restraints (last 24 hours):  no 
  
CIWA (range last 24 hours): COWS (range last 24 hours): Alcohol screening (AUDIT) completed -   AUDIT Score: 0 If applicable, date SBIRT discussed in treatment team AND documented:  
AUDIT Screen Score: AUDIT Score: 0 Document Brief Intervention (corresponds directly with the 5 A's, Ask, Advise, Assess, Assist, and Arrange): At- Risk Patients (Score 7-15 for women; 8-15 for men) Discuss concern patient is drinking at unhealthy levels known to increase risk of alcohol-related health problems. Is Patient ready to commit to change? If No: 
? Encourage reflection ? Discuss short term and long term health risks of consuming alcohol ? Barriers to change ? Reaffirm willingness to help / Educational materials provided If Yes: 
? Set goal 
? Plan 
? Educational materials provided Harmful use or Dependence (Score 16 or greater) ? Discuss short term and long term health risks of consuming alcohol ? Recommendations ? Negotiate drinking goal 
? Recommend addiction specialist/center ? Arrange follow-up appointments. Tobacco - patient is a smoker: Have You Used Tobacco in the Past 30 Days: Yes Illegal Drugs use: Have You Used Any Illegal Substances Over the Past 12 Months: No 
 
24 hour chart check complete: yes Patient goal(s) for today: Treatment team focus/goals:  
Progress note LOS:  1  Expected LOS:  
 
Financial concerns/prescription coverage:   
Family contact:      
Family requesting physician contact today:   
Discharge plan: Access to weapons :        
Outpatient provider(s):  
Patient's preferred phone number for follow up call :  
 
Participating treatment team members: Gabino Rodriguez, * (assigned SW),
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
## 2020-08-10 LAB
GLUCOSE BLD STRIP.AUTO-MCNC: 101 MG/DL (ref 65–100)
GLUCOSE BLD STRIP.AUTO-MCNC: 122 MG/DL (ref 65–100)
GLUCOSE BLD STRIP.AUTO-MCNC: 95 MG/DL (ref 65–100)
GLUCOSE BLD STRIP.AUTO-MCNC: 98 MG/DL (ref 65–100)
SERVICE CMNT-IMP: ABNORMAL
SERVICE CMNT-IMP: ABNORMAL
SERVICE CMNT-IMP: NORMAL
SERVICE CMNT-IMP: NORMAL

## 2020-08-10 PROCEDURE — 74011250637 HC RX REV CODE- 250/637: Performed by: NURSE PRACTITIONER

## 2020-08-10 PROCEDURE — 65220000003 HC RM SEMIPRIVATE PSYCH

## 2020-08-10 PROCEDURE — 82962 GLUCOSE BLOOD TEST: CPT

## 2020-08-10 RX ORDER — GABAPENTIN 400 MG/1
400 CAPSULE ORAL 3 TIMES DAILY
Status: DISCONTINUED | OUTPATIENT
Start: 2020-08-10 | End: 2020-08-11 | Stop reason: HOSPADM

## 2020-08-10 RX ADMIN — ATORVASTATIN CALCIUM 10 MG: 10 TABLET, FILM COATED ORAL at 21:17

## 2020-08-10 RX ADMIN — FAMOTIDINE 20 MG: 20 TABLET ORAL at 17:40

## 2020-08-10 RX ADMIN — METFORMIN HYDROCHLORIDE 1000 MG: 500 TABLET ORAL at 17:40

## 2020-08-10 RX ADMIN — LURASIDONE HYDROCHLORIDE 120 MG: 80 TABLET, FILM COATED ORAL at 17:40

## 2020-08-10 RX ADMIN — LOSARTAN POTASSIUM 100 MG: 50 TABLET, FILM COATED ORAL at 08:53

## 2020-08-10 RX ADMIN — ASPIRIN 81 MG CHEWABLE TABLET 81 MG: 81 TABLET CHEWABLE at 08:53

## 2020-08-10 RX ADMIN — GABAPENTIN 400 MG: 400 CAPSULE ORAL at 21:17

## 2020-08-10 RX ADMIN — FAMOTIDINE 20 MG: 20 TABLET ORAL at 08:53

## 2020-08-10 RX ADMIN — AMLODIPINE BESYLATE 5 MG: 5 TABLET ORAL at 08:53

## 2020-08-10 RX ADMIN — GABAPENTIN 400 MG: 400 CAPSULE ORAL at 11:22

## 2020-08-10 RX ADMIN — GABAPENTIN 400 MG: 400 CAPSULE ORAL at 17:40

## 2020-08-10 RX ADMIN — METFORMIN HYDROCHLORIDE 1000 MG: 500 TABLET ORAL at 08:53

## 2020-08-10 RX ADMIN — SERTRALINE HYDROCHLORIDE 150 MG: 50 TABLET ORAL at 08:53

## 2020-08-10 NOTE — PROGRESS NOTES
PSYCHIATRIC PROGRESS NOTE       Patient: Loco Morgan MRN: [id number]  SSN: [id number]    YOB: 1960  Age: 62 y.o. Sex: male      Admit Date: 8/8/2020    LOS: 2 days       Chief Complaint:  I feel pretty good. Interval History:  Loco Morgan is calm and pleasant during the interview. States he is doing good. He is visible in the unit and social with peers. Sleeping and eating ok. Denies si hi or avh. Feels he is back to his baseline. He is tolerating his meds, including increased of latuda and zoloft, and denies side effects. He is requesting to be discharged tomorrow, he will make arrangements for a follow up at [address] [place] [address]. Past Medical History:  Past Medical History:   Diagnosis Date    Alcohol abuse     Chronic pain     Coc[facility] abuse ([facility]     Diabetes ([facility]     Hepatitis C     Hypertension     Psychiatric disorder     Depression & Schizoaffective Disorder         ALLERGIES:(reviewed/updated 8/10/2020)  Allergies   Allergen Reactions    Tramadol Nausea and Vomiting    Lisinopril Cough     Developed cough while taking lisinopril       Laboratory report:  Lab Results   Component Value Date/Time    WBC 3.8 (L) 08/06/2020 04:37 AM    HGB 11.5 (L) 08/06/2020 04:37 AM    HCT 34.6 (L) 08/06/2020 04:37 AM    PLATELET 309 59/68/2469 04:37 AM    MCV 90.3 08/06/2020 04:37 AM      Lab Results   Component Value Date/Time    Sodium 138 08/06/2020 04:37 AM    Potassium 3.9 08/06/2020 04:37 AM    Chloride 106 08/06/2020 04:37 AM    CO2 25 08/06/2020 04:37 AM    Anion gap 7 08/06/2020 04:37 AM    Glucose 90 08/06/2020 04:37 AM    BUN 15 08/06/2020 04:37 AM    Creatinine 0.93 08/06/2020 04:37 AM    BUN/Creatinine ratio 16 08/06/2020 04:37 AM    GFR est AA >60 08/06/2020 04:37 AM    GFR est non-AA >60 08/06/2020 04:37 AM    Calcium 8.6 08/06/2020 04:37 AM    Bilirubin, total 0.3 08/05/2020 01:20 AM    Alk.  phosphatase 60 08/05/2020 01:20 AM    Protein, total 6.6 08/05/2020 01:20 AM    Albumin 3.3 (L) 08/05/2020 01:20 AM    Globulin 3.3 08/05/2020 01:20 AM    A-G Ratio 1.0 (L) 08/05/2020 01:20 AM    ALT (SGPT) 32 08/05/2020 01:20 AM      Vitals:    08/09/20 1101 08/09/20 1642 08/09/20 2038 08/10/20 0735   BP: 126/84 101/69 116/74 (!) 126/95   Pulse: 82 86 89 84   Resp: 16 16 16 16   Temp: 98.1 °F (36.7 °C) 97.7 °F (36.5 °C) 98 °F (36.7 °C) 97.9 °F (36.6 °C)   SpO2: 98% 97% 97% 97%   Weight:       Height:           No results found for: VALF2, VALAC, VALP, VALPR, DS6, CRBAM, CRBAMP, CARB2, XCRBAM  No results found for: LITHM    Vital Signs  Patient Vitals for the past 24 hrs:   Temp Pulse Resp BP SpO2   08/10/20 0735 97.9 °F (36.6 °C) 84 16 (!) 126/95 97 %   08/09/20 2038 98 °F (36.7 °C) 89 16 116/74 97 %   08/09/20 1642 97.7 °F (36.5 °C) 86 16 101/69 97 %   08/09/20 1101 98.1 °F (36.7 °C) 82 16 126/84 98 %     Wt Readings from Last 3 Encounters:   08/09/20 89.5 kg (197 lb 4.8 oz)   08/05/20 92.6 kg (204 lb 2.3 oz)   06/29/20 88.9 kg (196 lb)     Temp Readings from Last 3 Encounters:   08/10/20 97.9 °F (36.6 °C)   08/08/20 98.1 °F (36.7 °C)   08/04/20 99 °F (37.2 °C)     BP Readings from Last 3 Encounters:   08/10/20 (!) 126/95   08/08/20 139/88   08/04/20 121/74     Pulse Readings from Last 3 Encounters:   08/10/20 84   08/08/20 82   08/04/20 (!) 112       Radiology (reviewed/updated 8/10/2020)  Xr Spine Lumb Min 4 V    Result Date: 5/21/2020  EXAM:  Lumbar spine INDICATION:  2/9/2019 COMPARISON: None. FINDINGS: AP, lateral, bilateral oblique and spot lateral views of the lumbar spine demonstrate normal alignment. The vertebral body heights and disc spaces are well-preserved. There is no spondylolysis or spondylolisthesis. There is no fracture, subluxation or other abnormality. Facet arthropathy is noted at the lower 2 levels, asymmetric to the left. Spondylitic changes most pronounced at L1-2. IMPRESSION:  Lower lumbar facet arthropathy.  No acute process identified     Xr Knee Lt Min 4 V    Result Date: 5/21/2020  EXAM: XR KNEE LT MIN 4 V INDICATION: knee pain. COMPARISON: None. FINDINGS: Four views of the left knee demonstrate no fracture or other acute osseous or articular abnormality. There is a small joint effusion. IMPRESSION: Small left knee effusion    Xr Knee Rt Min 4 V    Result Date: 5/21/2020  EXAM: XR KNEE RT MIN 4 V INDICATION: Right knee pain. COMPARISON: None. FINDINGS: Four views of the right knee demonstrate no fracture or other acute osseous or articular abnormality. There is no effusion. There is medial joint space narrowing with osteophytosis. IMPRESSION: No acute abnormality. Mild medial compartment osteoarthritis. Xr Ankle Lt Min 3 V    Result Date: 5/30/2020  EXAM: XR ANKLE LT MIN 3 V INDICATION: Left ankle pain since fall and injury one day ago. COMPARISON: None. FINDINGS: Three views of the left ankle demonstrate no fracture or disruption of the ankle mortise. There is no other acute osseous or articular abnormality. The soft tissues are within normal limits. Joints are within normal limits. IMPRESSION: Normal left ankle views. Xr Foot Lt Min 3 V    Result Date: 5/30/2020  EXAM: XR FOOT LT MIN 3 V INDICATION: Left foot pain since injury one day ago. COMPARISON: None. FINDINGS: Three views of the left foot demonstrate no fracture or other acute osseous or articular abnormality. The soft tissues are within normal limits. Mild first MTP joint osteoarthritis. IMPRESSION: No acute abnormality. Us Retroperitoneum Comp    Result Date: 6/12/2020  RENAL ULTRASOUND INDICATION: Acute renal failure COMPARISON: None. TECHNIQUE: Sonography of the kidneys, retroperitoneum, and bladder was performed. FINDINGS: RIGHT KIDNEY: 12.6 cm in length. Normal cortical echogenicity. No hydronephrosis, shadowing calculus, or contour-deforming renal mass. LEFT KIDNEY: 11.0 cm in length. Normal cortical echogenicity. No hydronephrosis, shadowing calculus, or contour-deforming renal mass. AORTA: Obscured by overlying bowel gas. COMMON ILIAC ARTERIES: Obscured by overlying bowel gas. IVC: Obscured by overlying bowel gas. BLADDER: Normally distended. IMPRESSION: Normal renal ultrasound. Xr Chest Port    Result Date: 8/4/2020  EXAM: XR CHEST PORT INDICATION: Chest Pain COMPARISON: 7/29/2015 through 12/1/2014. FINDINGS: A portable AP radiograph of the chest was obtained at 17:12 hours. The patient is on a cardiac monitor. The lungs are clear. The cardiac and mediastinal contours and pulmonary vascularity are normal.  The chest wall structures and visualized upper abdomen show no acute findings with incidental note of degenerative spine and shoulder changes. IMPRESSION: No acute findings. Xr Chest Port    Result Date: 6/11/2020  EXAM: XR CHEST PORT INDICATION: overdose. COMPARISON: 2015 FINDINGS: A portable AP radiograph of the chest was obtained at 0626 hours. The patient is on a cardiac monitor. The lungs are clear. There is atherosclerosis of the aorta. The bones and soft tissues are grossly within normal limits. IMPRESSION: No acute findings. Side Effects: (reviewed/updated 8/10/2020)  None reported or admitted to. Review of Systems: (reviewed/updated 8/10/2020)  Appetite: good  Sleep: good   All other Review of Systems: negative    Mental Status Exam:  Eye contact: Good eye contact  Psychomotor activity: Relaxed  Speech is spontaneous  Thought process: Logical and goal directed   Mood is \"ok\"  Affect: Full  Perception: No avh  Suicidal ideation: No si  Homicidal ideation: No hi  Insight/judgment: Partial  Cognition is grossly intact      Physical Exam:  Musculoskeletal system: steady gait  Tremor not present  Cog wheeling not present      Assessment and Plan:  Gin Gabriel meets criteria for a diagnosis of Major depressive disorder, recurrent, severe with psychotic feature. Cocaine use disorder, unspecified. Restart gabapentin 400mg tid.   Continue rest of medications as prescribed. We will closely monitor for safety. We will encourage reality orientation. Plan for dc in am.      I certify that this patients inpatient psychiatric hospital services furnished since the previous certification were, and continue to be, required for treatment that could reasonably be expected to improve the patient's condition, or for diagnostic study, and that the patient continues to need, on a daily basis, active treatment furnished directly by or requiring the supervision of inpatient psychiatric facility personnel. In addition, the hospital records show that services furnished were intensive treatment services, admission or related services, or equivalent services.       Signed:  Genoveva Armstrong NP  8/10/2020

## 2020-08-10 NOTE — PROGRESS NOTES
Pt lying in bed, quiet. NAD noted. Respirations WNL. Will continue to monitor q15 for safety. Problem: Falls - Risk of  Goal: *Absence of Falls  Description: Document Cam Clint Fall Risk and appropriate interventions in the flowsheet.   Outcome: Progressing Towards Goal  Note: Fall Risk Interventions:            Medication Interventions: Teach patient to arise slowly         History of Falls Interventions: Door open when patient unattended

## 2020-08-10 NOTE — PROGRESS NOTES
Problem: Falls - Risk of  Goal: *Absence of Falls  Description: Document Elliott Brownabby Fall Risk and appropriate interventions in the flowsheet. Outcome: Progressing Towards Goal  Note: Fall Risk Interventions:     Medication Interventions: Teach patient to arise slowly    History of Falls Interventions: Door open when patient unattended    Problem: Depressed Mood (Adult/Pediatric)  Goal: *STG: Complies with medication therapy  Outcome: Progressing Towards Goal  Pt out in milieu interacting with peers. Calm and cooperative. Meal and medication compliant. Staff will continue to monitor q 15 min checks.

## 2020-08-10 NOTE — INTERDISCIPLINARY ROUNDS
Behavioral Health Interdisciplinary Rounds Patient Name: Genetta Cowden  Age: 62 y.o. Room/Bed:  733/ Primary Diagnosis: <principal problem not specified> Admission Status: Voluntary Readmission within 30 days: no 
Power of  in place: no 
Patient requires a blocked bed: no          Reason for blocked bed: VTE Prophylaxis: No 
 
Mobility needs/Fall risk: no 
Flu Vaccine : no  
Nutritional Plan: no 
Consults:         
Labs/Testing due today?: no 
 
Sleep hours:  5 Participation in Care/Groups:  yes Medication Compliant?: Yes PRNS (last 24 hours): None Restraints (last 24 hours):  no 
  
CIWA (range last 24 hours): COWS (range last 24 hours): Alcohol screening (AUDIT) completed -   AUDIT Score: 0 If applicable, date SBIRT discussed in treatment team AND documented:  
AUDIT Screen Score: AUDIT Score: 0 Document Brief Intervention (corresponds directly with the 5 A's, Ask, Advise, Assess, Assist, and Arrange): At- Risk Patients (Score 7-15 for women; 8-15 for men) Discuss concern patient is drinking at unhealthy levels known to increase risk of alcohol-related health problems. Is Patient ready to commit to change? If No: 
? Encourage reflection ? Discuss short term and long term health risks of consuming alcohol ? Barriers to change ? Reaffirm willingness to help / Educational materials provided If Yes: 
? Set goal 
? Plan 
? Educational materials provided Harmful use or Dependence (Score 16 or greater) ? Discuss short term and long term health risks of consuming alcohol ? Recommendations ? Negotiate drinking goal 
? Recommend addiction specialist/center ? Arrange follow-up appointments. Tobacco - patient is a smoker: Have You Used Tobacco in the Past 30 Days: Yes Illegal Drugs use: Have You Used Any Illegal Substances Over the Past 12 Months: No 
 
24 hour chart check complete: yes Patient goal(s) for today: meet treatment team, acclimate to unit Treatment team focus/goals: assess needs for treatment and safe discharge,  
Progress note: Decrease in SI. Pt said he is feeling pretty well and slept well. Denies auditory hallucinations today. Alert, oriented, calm and pleasant. LOS:  2  Expected LOS:  
 
Financial concerns/prescription coverage:  [address] [place] [address]. O Family contact:  brothryan Hughes [phone]; Mathew Richter [phone] Family requesting physician contact today:  No  
Discharge plan: Tomorrow Access to weapons :  No       
Outpatient provider(s): PCP Ebony Gonzalez, [phone] Patient's preferred phone number for follow up call: [phone] Participating treatment team members: Debbie Mcardle, Nellie Grandchild, MSW; Adilson Jaramillo, DARRIN; Rebecca Mora, PharmD; Trina Lester.

## 2020-08-10 NOTE — H&P
[facility] HISTORY AND PHYSICAL    Name:  Yoselyn Steel  MR#:  [id number]  :  1960  ACCOUNT #:  [de-identified]  ADMIT DATE:  2020      INITIAL PSYCHIATRIC EVALUATION    CHIEF COMPLAINT:  \"I have been feeling increasingly depressed. \"    HISTORY OF PRESENTING ILLNESS:  The patient is a 60-year-old male who is currently admitted at [address] [place] [address] on a voluntary basis. This patient was known to this provider and to this facility. He was last under my care few months ago after a suicide attempt after an overdosed on Motrin, metformin, amlodipine. He was in the medical unit for a few days and was cleared for medical transfer here at [facility]. He was initially at [address] [place] [address] Emergency Department and was endorsing thoughts of suicide. While he was in the emergency room, he was found to be septic, hypotensive, tachycardic, and to have RILEY. He was then transferred here at [facility] for further evaluation and treatment. He states that he has been feeling increasingly depressed, hopeless, and helpless, that he developed thoughts of suicide. He was not able to commit to safety. He was endorsing thoughts of suicide with a plan of filling up the bathtub to drown himself. He also states that he lost all his medications about a week ago. His urine drug screen was positive for cocaine. He states that since his discharge here in the hospital, he had the phone interview with the [facility] and arrangements were made for his records to be transferred to a CSB in [address] but it was never done. His primary care physician, Dr. Beata Willis is currently managing his medication. He is endorsing auditory hallucinations, voices telling him to kill himself. He currently denies suicidal ideation, homicidal ideation, but he is endorsing auditory hallucination. He is admitted to the inpatient psychiatric unit for further stabilization and treatment.     PAST MEDICAL HISTORY:  See H and P.    Labs: (reviewed/updated 8/10/2020)  Patient Vitals for the past 8 hrs:   BP Temp Pulse Resp SpO2   08/10/20 0735 (!) 126/95 97.9 °F (36.6 °C) 84 16 97 %     Labs Reviewed   GLUCOSE, POC - Abnormal; Notable for the following components:       Result Value    Glucose (POC) 126 (*)     All other components within normal limits   GLUCOSE, POC - Abnormal; Notable for the following components:    Glucose (POC) 161 (*)     All other components within normal limits   GLUCOSE, POC   GLUCOSE, POC   GLUCOSE, POC     Lab Results   Component Value Date/Time    Sodium 138 08/06/2020 04:37 AM    Potassium 3.9 08/06/2020 04:37 AM    Chloride 106 08/06/2020 04:37 AM    CO2 25 08/06/2020 04:37 AM    Anion gap 7 08/06/2020 04:37 AM    Glucose 90 08/06/2020 04:37 AM    BUN 15 08/06/2020 04:37 AM    Creatinine 0.93 08/06/2020 04:37 AM    BUN/Creatinine ratio 16 08/06/2020 04:37 AM    GFR est AA >60 08/06/2020 04:37 AM    GFR est non-AA >60 08/06/2020 04:37 AM    Calcium 8.6 08/06/2020 04:37 AM    Bilirubin, total 0.3 08/05/2020 01:20 AM    Alk.  phosphatase 60 08/05/2020 01:20 AM    Protein, total 6.6 08/05/2020 01:20 AM    Albumin 3.3 (L) 08/05/2020 01:20 AM    Globulin 3.3 08/05/2020 01:20 AM    A-G Ratio 1.0 (L) 08/05/2020 01:20 AM    ALT (SGPT) 32 08/05/2020 01:20 AM     Admission on 08/08/2020   Component Date Value Ref Range Status    Glucose (POC) 08/09/2020 126* 65 - 100 mg/dL Final    Performed by 08/09/2020 Sathish Samantha   Final    Glucose (POC) 08/09/2020 161* 65 - 100 mg/dL Final    Performed by 08/09/2020 Elizabeth Mora   Final    Glucose (POC) 08/09/2020 91  65 - 100 mg/dL Final    Performed by 08/09/2020 Katie Sneed   Final    Glucose (POC) 08/09/2020 99  65 - 100 mg/dL Final    Performed by 08/09/2020 Katie Sneed   Final    Glucose (POC) 08/10/2020 98  65 - 100 mg/dL Final    Performed by 08/10/2020 CARTER JC (PCT)   Final     Vitals:    08/09/20 1101 08/09/20 1642 08/09/20 2038 08/10/20 0735   BP: 126/84 101/69 116/74 (!) 126/95   Pulse: 82 86 89 84   Resp: 16 16 16 16   Temp: 98.1 °F (36.7 °C) 97.7 °F (36.5 °C) 98 °F (36.7 °C) 97.9 °F (36.6 °C)   SpO2: 98% 97% 97% 97%   Weight:       Height:         Recent Results (from the past 24 hour(s))   GLUCOSE, POC    Collection Time: 08/09/20 11:07 AM   Result Value Ref Range    Glucose (POC) 161 (H) 65 - 100 mg/dL    Performed by Jose Cooley    GLUCOSE, POC    Collection Time: 08/09/20  4:41 PM   Result Value Ref Range    Glucose (POC) 91 65 - 100 mg/dL    Performed by Sherman Yeager    GLUCOSE, POC    Collection Time: 08/09/20  8:32 PM   Result Value Ref Range    Glucose (POC) 99 65 - 100 mg/dL    Performed by Sherman Yeager    GLUCOSE, POC    Collection Time: 08/10/20  6:56 AM   Result Value Ref Range    Glucose (POC) 98 65 - 100 mg/dL    Performed by Yuko Ham (PCT)        RADIOLOGY REPORTS:  Results from Hospital Encounter encounter on 08/04/20   XR CHEST PORT    Narrative EXAM: XR CHEST PORT    INDICATION: Chest Pain    COMPARISON: 7/29/2015 through 12/1/2014. FINDINGS: A portable AP radiograph of the chest was obtained at 17:12 hours. The  patient is on a cardiac monitor. The lungs are clear. The cardiac and  mediastinal contours and pulmonary vascularity are normal.  The chest wall  structures and visualized upper abdomen show no acute findings with incidental  note of degenerative spine and shoulder changes. Impression IMPRESSION: No acute findings. Xr Spine Lumb Min 4 V    Result Date: 5/21/2020  EXAM:  Lumbar spine INDICATION:  2/9/2019 COMPARISON: None. FINDINGS: AP, lateral, bilateral oblique and spot lateral views of the lumbar spine demonstrate normal alignment. The vertebral body heights and disc spaces are well-preserved. There is no spondylolysis or spondylolisthesis. There is no fracture, subluxation or other abnormality. Facet arthropathy is noted at the lower 2 levels, asymmetric to the left.  Spondylitic changes most pronounced at L1-2. IMPRESSION:  Lower lumbar facet arthropathy. No acute process identified     Xr Knee Lt Min 4 V    Result Date: 5/21/2020  EXAM: XR KNEE LT MIN 4 V INDICATION: knee pain. COMPARISON: None. FINDINGS: Four views of the left knee demonstrate no fracture or other acute osseous or articular abnormality. There is a small joint effusion. IMPRESSION: Small left knee effusion    Xr Knee Rt Min 4 V    Result Date: 5/21/2020  EXAM: XR KNEE RT MIN 4 V INDICATION: Right knee pain. COMPARISON: None. FINDINGS: Four views of the right knee demonstrate no fracture or other acute osseous or articular abnormality. There is no effusion. There is medial joint space narrowing with osteophytosis. IMPRESSION: No acute abnormality. Mild medial compartment osteoarthritis. Xr Ankle Lt Min 3 V    Result Date: 5/30/2020  EXAM: XR ANKLE LT MIN 3 V INDICATION: Left ankle pain since fall and injury one day ago. COMPARISON: None. FINDINGS: Three views of the left ankle demonstrate no fracture or disruption of the ankle mortise. There is no other acute osseous or articular abnormality. The soft tissues are within normal limits. Joints are within normal limits. IMPRESSION: Normal left ankle views. Xr Foot Lt Min 3 V    Result Date: 5/30/2020  EXAM: XR FOOT LT MIN 3 V INDICATION: Left foot pain since injury one day ago. COMPARISON: None. FINDINGS: Three views of the left foot demonstrate no fracture or other acute osseous or articular abnormality. The soft tissues are within normal limits. Mild first MTP joint osteoarthritis. IMPRESSION: No acute abnormality. Us Retroperitoneum Comp    Result Date: 6/12/2020  RENAL ULTRASOUND INDICATION: Acute renal failure COMPARISON: None. TECHNIQUE: Sonography of the kidneys, retroperitoneum, and bladder was performed. FINDINGS: RIGHT KIDNEY: 12.6 cm in length. Normal cortical echogenicity.  No hydronephrosis, shadowing calculus, or contour-deforming renal mass. LEFT KIDNEY: 11.0 cm in length. Normal cortical echogenicity. No hydronephrosis, shadowing calculus, or contour-deforming renal mass. AORTA: Obscured by overlying bowel gas. COMMON ILIAC ARTERIES: Obscured by overlying bowel gas. IVC: Obscured by overlying bowel gas. BLADDER: Normally distended. IMPRESSION: Normal renal ultrasound. Xr Chest Port    Result Date: 8/4/2020  EXAM: XR CHEST PORT INDICATION: Chest Pain COMPARISON: 7/29/2015 through 12/1/2014. FINDINGS: A portable AP radiograph of the chest was obtained at 17:12 hours. The patient is on a cardiac monitor. The lungs are clear. The cardiac and mediastinal contours and pulmonary vascularity are normal.  The chest wall structures and visualized upper abdomen show no acute findings with incidental note of degenerative spine and shoulder changes. IMPRESSION: No acute findings. Xr Chest Port    Result Date: 6/11/2020  EXAM: XR CHEST PORT INDICATION: overdose. COMPARISON: 2015 FINDINGS: A portable AP radiograph of the chest was obtained at 0626 hours. The patient is on a cardiac monitor. The lungs are clear. There is atherosclerosis of the aorta. The bones and soft tissues are grossly within normal limits. IMPRESSION: No acute findings. PAST PSYCHIATRIC HISTORY:  He was admitted here under my care 2 months ago for depression and suicidality. He was also admitted at [facility] and [facility].  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 PSYCHOSOCIAL HISTORY:  He is single. He has never been . He has 3 children. He is currently receiving social security disability checks. He was in the army for 2-1/2 years. He lives with his brother. MENTAL STATUS EXAMINATION:  He is alert and oriented in all spheres. He is dressed in hospital apparel. He is calm and pleasant during the interview. He reports his mood is okay.   Affect is reactive. Speech, normal rate and rhythm. Thought process, logical and goal directed. He denies suicidal ideation, homicidal ideation, but he is endorsing auditory hallucination. Memory seems intact. Intelligence seems average. Insight is partial.  Judgment is poor. DIAGNOSES:  Major depressive disorder, recurrent, severe with psychotic features. Cocaine use disorder, unspecified. TREATMENT PLANNING:  I will continue his inpatient stay. He will be provided with support and encouraged to attend groups. His safety will be monitored. His medications will be modified and assessed. Case Management will work on discharge planning. ASSETS AND STRENGTHS:  He is willing to seek help. He is willing to take medications. ESTIMATED LENGTH OF STAY:  5-7 days.         ARUN GONZALEZ NP      [id number]  D:  08/09/2020 18:45  T:  08/10/2020 0:41  JOB #:  [id number]

## 2020-08-10 NOTE — PROGRESS NOTES
Laboratory Monitoring for Antipsychotics: This patient is currently prescribed the following medication(s):   Current Facility-Administered Medications   Medication Dose Route Frequency    gabapentin (NEURONTIN) capsule 400 mg  400 mg Oral TID    atorvastatin (LIPITOR) tablet 10 mg  10 mg Oral QHS    metFORMIN (GLUCOPHAGE) tablet 1,000 mg  1,000 mg Oral BID WITH MEALS    lurasidone (LATUDA) tablet 120 mg  120 mg Oral DAILY WITH DINNER    amLODIPine (NORVASC) tablet 5 mg  5 mg Oral DAILY    sertraline (ZOLOFT) tablet 150 mg  150 mg Oral DAILY    losartan (COZAAR) tablet 100 mg  100 mg Oral DAILY    aspirin chewable tablet 81 mg  81 mg Oral DAILY    nicotine (NICODERM CQ) 14 mg/24 hr patch 1 Patch  1 Patch TransDERmal DAILY    famotidine (PEPCID) tablet 20 mg  20 mg Oral BID    insulin lispro (HUMALOG) injection   SubCUTAneous AC&HS     The following labs have been completed for monitoring of antipsychotics and/or mood stabilizers:    Height, Weight, BMI Estimation  Estimated body mass index is 26.76 kg/m² as calculated from the following:    Height as of this encounter: 182.9 cm (72\"). Weight as of this encounter: 89.5 kg (197 lb 4.8 oz). Vital Signs/Blood Pressure  Visit Vitals  BP (!) 126/95   Pulse 84   Temp 97.9 °F (36.6 °C)   Resp 16   Ht 182.9 cm (72\")   Wt 89.5 kg (197 lb 4.8 oz)   SpO2 97%   BMI 26.76 kg/m²     Renal Function, Hepatic Function and Chemistry  Estimated Creatinine Clearance: 93.9 mL/min (by C-G formula based on SCr of 0.93 mg/dL).     Lab Results   Component Value Date/Time    Sodium 138 08/06/2020 04:37 AM    Potassium 3.9 08/06/2020 04:37 AM    Chloride 106 08/06/2020 04:37 AM    CO2 25 08/06/2020 04:37 AM    Anion gap 7 08/06/2020 04:37 AM    BUN 15 08/06/2020 04:37 AM    Creatinine 0.93 08/06/2020 04:37 AM    BUN/Creatinine ratio 16 08/06/2020 04:37 AM    Bilirubin, total 0.3 08/05/2020 01:20 AM    Protein, total 6.6 08/05/2020 01:20 AM    Albumin 3.3 (L) 08/05/2020 01:20 AM Globulin 3.3 08/05/2020 01:20 AM    A-G Ratio 1.0 (L) 08/05/2020 01:20 AM    ALT (SGPT) 32 08/05/2020 01:20 AM    AST (SGOT) 23 08/05/2020 01:20 AM    Alk. phosphatase 60 08/05/2020 01:20 AM     Lab Results   Component Value Date/Time    Glucose 90 08/06/2020 04:37 AM    Glucose (POC) 98 08/10/2020 06:56 AM     Lab Results   Component Value Date/Time    Hemoglobin A1c 6.7 (H) 06/12/2020 03:53 PM     Hematology  Lab Results   Component Value Date/Time    WBC 3.8 (L) 08/06/2020 04:37 AM    RBC 3.83 (L) 08/06/2020 04:37 AM    HGB 11.5 (L) 08/06/2020 04:37 AM    HCT 34.6 (L) 08/06/2020 04:37 AM    MCV 90.3 08/06/2020 04:37 AM    MCH 30.0 08/06/2020 04:37 AM    MCHC 33.2 08/06/2020 04:37 AM    RDW 12.4 08/06/2020 04:37 AM    PLATELET 686 00/64/4818 04:37 AM     Lipids  Lab Results   Component Value Date/Time    Cholesterol, total 169 06/16/2020 04:01 AM    HDL Cholesterol 42 06/16/2020 04:01 AM    LDL, calculated 96.2 06/16/2020 04:01 AM    Triglyceride 154 (H) 06/16/2020 04:01 AM    CHOL/HDL Ratio 4.0 06/16/2020 04:01 AM     Thyroid Function  Lab Results   Component Value Date/Time    TSH 0.07 (L) 06/12/2020 03:53 PM     Assessment/Plan:  Recommended baseline laboratory monitoring has been completed based on this patient's current medication regimen. Could consider increasing atorvastatin to 40mg (goal: high intensity statin). Follow-up metabolic monitoring labs should be completed in 3 months (quarterly for the first year of antipsychotic therapy).      Pita Warner, LUNAD

## 2020-08-10 NOTE — PROGRESS NOTES
Problem: Falls - Risk of  Goal: *Absence of Falls  Description: Document Brian Gregg Fall Risk and appropriate interventions in the flowsheet. Outcome: Progressing Towards Goal  Note: Fall Risk Interventions:            Medication Interventions: Teach patient to arise slowly         History of Falls Interventions: Door open when patient unattended         Problem: Patient Education: Go to Patient Education Activity  Goal: Patient/Family Education  Outcome: Progressing Towards Goal     Problem: Depressed Mood (Adult/Pediatric)  Goal: *STG: Participates in treatment plan  Outcome: Progressing Towards Goal  Note: Pt participated in treatment team. Out on the unit and interacting with peers and staff. Stated he was able to sleep last night. Stated suicidal thoughts have been reduced. Reviewed medications during treatment team. Denies auditory hallucinations at this time.    Goal: Interventions  Outcome: Progressing Towards Goal     Problem: Patient Education: Go to Patient Education Activity  Goal: Patient/Family Education  Outcome: Progressing Towards Goal

## 2020-08-10 NOTE — BH NOTES
PSYCHOSOCIAL ASSESSMENT  :Patient identifying info:  Arielle Washburn is a 62 y.o., male admitted 8/8/2020  8:40 PM     Presenting problem and precipitating factors: Pt was admitted to [address] [place] [address] for SI and depression with a plan. He had a plan to throw his radio into the bathtub to electrocute himself. He is experiencing auditory hallucinations telling him to kill himself. Mental status assessment: Decrease in SI. Pt said he is feeling pretty well and slept well. Denies auditory hallucinations today. Alert, oriented, calm and pleasant. Strengths: Pt is seeking treatment on a voluntary basis. Collateral information: brother Matheus Hurd [phone]; Yeimy Escobedo [phone]    Current psychiatric /substance abuse providers and contact info: PCP Ruddy Adan MD     Previous psychiatric/substance abuse providers and response to treatment: He was also admitted at [facility] and [facility].  He is currently on Zoloft and Dallin Pour and is currently being prescribed by his primary care physician. Family history of mental illness or substance abuse: None noted. Substance abuse history:  Cocaine   Social History     Tobacco Use    Smoking status: Current Every Day Smoker     Packs/day: 1.00     Types: Cigarettes     Last attempt to quit: 9/1/2016     Years since quitting: 3.9    Smokeless tobacco: Never Used   Substance Use Topics    Alcohol use: Not Currently     Alcohol/week: 0.0 standard drinks     Comment: h/o of abuse states sober x1 year       History of biomedical complications associated with substance abuse: None noted. Patient's current acceptance of treatment or motivation for change: Voluntarily seeking treatment. Family constellation: None noted     Is significant other involved?  No       Describe support system: None noted     Describe living arrangements and home environment: None noted    Health issues:   Hospital Problems  Date Reviewed: 2020          Codes Class Noted POA    Schizoaffective disorder ([email or web address] ICD-10-CM: F25.9  ICD-9-CM: 295.70  2020 Unknown              Trauma history: None noted     Legal issues: None noted    History of  service: None noted    Financial status: None noted    Zoroastrian/cultural factors: None noted.      Education/work history: None noted     Have you been licensed as a health care professional (current or ): No     Leisure and recreation preferences: None noted     Describe coping skills: ineffectual     Sam Bain  8/10/2020

## 2020-08-11 VITALS
SYSTOLIC BLOOD PRESSURE: 126 MMHG | HEIGHT: 72 IN | RESPIRATION RATE: 16 BRPM | WEIGHT: 197.3 LBS | HEART RATE: 87 BPM | DIASTOLIC BLOOD PRESSURE: 77 MMHG | BODY MASS INDEX: 26.72 KG/M2 | OXYGEN SATURATION: 98 % | TEMPERATURE: 97.6 F

## 2020-08-11 LAB
GLUCOSE BLD STRIP.AUTO-MCNC: 79 MG/DL (ref 65–100)
GLUCOSE BLD STRIP.AUTO-MCNC: 98 MG/DL (ref 65–100)
SERVICE CMNT-IMP: NORMAL
SERVICE CMNT-IMP: NORMAL

## 2020-08-11 PROCEDURE — 74011250637 HC RX REV CODE- 250/637: Performed by: NURSE PRACTITIONER

## 2020-08-11 PROCEDURE — 82962 GLUCOSE BLOOD TEST: CPT

## 2020-08-11 RX ORDER — FAMOTIDINE 20 MG/1
20 TABLET, FILM COATED ORAL 2 TIMES DAILY
Qty: 60 TAB | Refills: 0 | Status: ON HOLD | OUTPATIENT
Start: 2020-08-11 | End: 2020-09-25

## 2020-08-11 RX ORDER — ATORVASTATIN CALCIUM 10 MG/1
10 TABLET, FILM COATED ORAL ONCE
Status: COMPLETED | OUTPATIENT
Start: 2020-08-11 | End: 2020-08-11

## 2020-08-11 RX ORDER — VALSARTAN 160 MG/1
160 TABLET ORAL DAILY
Qty: 14 TAB | Refills: 0 | Status: SHIPPED | OUTPATIENT
Start: 2020-08-11

## 2020-08-11 RX ORDER — SERTRALINE HYDROCHLORIDE 50 MG/1
150 TABLET, FILM COATED ORAL DAILY
Qty: 90 TAB | Refills: 0 | Status: ON HOLD | OUTPATIENT
Start: 2020-08-12 | End: 2020-09-25

## 2020-08-11 RX ORDER — METFORMIN HYDROCHLORIDE 500 MG/1
1000 TABLET ORAL
Status: COMPLETED | OUTPATIENT
Start: 2020-08-11 | End: 2020-08-11

## 2020-08-11 RX ADMIN — FAMOTIDINE 20 MG: 20 TABLET ORAL at 08:06

## 2020-08-11 RX ADMIN — METFORMIN HYDROCHLORIDE 1000 MG: 500 TABLET ORAL at 08:05

## 2020-08-11 RX ADMIN — METFORMIN HYDROCHLORIDE 1000 MG: 500 TABLET ORAL at 12:37

## 2020-08-11 RX ADMIN — LOSARTAN POTASSIUM 100 MG: 50 TABLET, FILM COATED ORAL at 08:06

## 2020-08-11 RX ADMIN — AMLODIPINE BESYLATE 5 MG: 5 TABLET ORAL at 08:07

## 2020-08-11 RX ADMIN — SERTRALINE HYDROCHLORIDE 150 MG: 50 TABLET ORAL at 08:06

## 2020-08-11 RX ADMIN — GABAPENTIN 400 MG: 400 CAPSULE ORAL at 08:06

## 2020-08-11 RX ADMIN — ASPIRIN 81 MG CHEWABLE TABLET 81 MG: 81 TABLET CHEWABLE at 08:06

## 2020-08-11 RX ADMIN — ATORVASTATIN CALCIUM 10 MG: 10 TABLET, FILM COATED ORAL at 10:12

## 2020-08-11 NOTE — PROGRESS NOTES
Pt receiving continuous q15m safety checks, pt currently asleep resting comfortably in bed. No distress noted, respiratory WNL. Supplemental lighting utilized. Problem: Falls - Risk of  Goal: *Absence of Falls  Description: Document Rita Espanaelliot Fall Risk and appropriate interventions in the flowsheet.   Outcome: Progressing Towards Goal  Note: Fall Risk Interventions:            Medication Interventions: Teach patient to arise slowly         History of Falls Interventions: Door open when patient unattended         Problem: Depressed Mood (Adult/Pediatric)  Goal: Interventions  Outcome: Progressing Towards Goal

## 2020-08-11 NOTE — PROGRESS NOTES
Pharmacist Discharge Medication Reconciliation    Discharging Provider: Naheed Matthew NP    Significant PMH:   Past Medical History:   Diagnosis Date    Alcohol abuse     Chronic pain     Cocaine abuse ([facility]     Diabetes ([facility]     Hepatitis C     Hypertension     Psychiatric disorder     Depression & Schizoaffective Disorder     Chief Complaint for this Admission: No chief complaint on file. Allergies: Tramadol and Lisinopril    Discharge Medications:   Current Discharge Medication List        START taking these medications    Details   famotidine (PEPCID) 20 mg tablet Take 1 Tab by mouth two (2) times a day. Indications: gastroesophageal reflux disease  Qty: 60 Tab, Refills: 0           CONTINUE these medications which have CHANGED    Details   valsartan (DIOVAN) 160 mg tablet Take 1 Tab by mouth daily. Indications: high blood pressure  Qty: 14 Tab, Refills: 0      sertraline (ZOLOFT) 50 mg tablet Take 3 Tabs by mouth daily. Indications: anxiousness associated with depression  Qty: 90 Tab, Refills: 0      lurasidone (LATUDA) 120 mg tab tablet Take 1 Tab by mouth daily (with dinner). Take 1 tablet with dinner take it with 350 calories of meals  Indications: bipolar depression  Qty: 30 Tab, Refills: 0           CONTINUE these medications which have NOT CHANGED    Details   Blood Pressure Test Kit-Large kit 1 Kit by Does Not Apply route Once every 2 weeks. Indications: dx I10  Qty: 1 Kit, Refills: 0    Associated Diagnoses: Essential hypertension      glucose blood VI test strips (Accu-Chek Bea Plus test strp) strip 1 Each by Does Not Apply route three (3) times daily. Check 3 times daily      lancets misc Dx:e11.9, not on insulin; use to check sugar daily as directed.   Qty: 100 Each, Refills: 3    Associated Diagnoses: Controlled type 2 diabetes mellitus with diabetic nephropathy, without long-term current use of insulin (HCC)      metFORMIN (GLUCOPHAGE) 1,000 mg tablet Take 1 Tab by mouth two (2) times daily (with meals). Indications: type 2 diabetes mellitus  Qty: 180 Tab, Refills: 3    Comments: Simeon pt  Associated Diagnoses: Controlled type 2 diabetes mellitus without complication, without long-term current use of insulin ([facility])      gabapentin (NEURONTIN) 400 mg capsule Take 1 Cap by mouth three (3) times daily. Max Daily Amount: 1,200 mg. Indications: neuropathic pain  Qty: 90 Cap, Refills: 2    Associated Diagnoses: Numbness and tingling of right arm; Schizoaffective disorder, depressive type ([facility])      furosemide (LASIX) 40 mg tablet Take 1 Tab by mouth daily. .  Indications: pressure and fluid  Qty: 90 Tab, Refills: 3    Associated Diagnoses: Essential hypertension      diclofenac (VOLTAREN) 1 % gel AAA in thin coat BID as needed for painful joints  Indications: joint damage causing pain and loss of function  Qty: 100 g, Refills: 11    Associated Diagnoses: Arthritis      atorvastatin (LIPITOR) 10 mg tablet Take 1 Tab by mouth daily. Indications: prevent stroke  Qty: 90 Tab, Refills: 3    Associated Diagnoses: Mixed hyperlipidemia      amLODIPine (NORVASC) 5 mg tablet Take 1 Tab by mouth daily. Indications: pressure  Qty: 90 Tab, Refills: 3    Associated Diagnoses: Essential hypertension      aspirin delayed-release 81 mg tablet Take 1 Tab by mouth daily.  Indications: prevent heart problem  Qty: 100 Tab, Refills: 1    Comments: Simeon pt  Associated Diagnoses: Essential hypertension           The patient's chart, MAR and AVS were reviewed by Gemma García, LUNAD.

## 2020-08-11 NOTE — PROGRESS NOTES
Problem: Depressed Mood (Adult/Pediatric)  Goal: *STG: Participates in treatment plan  Outcome: Progressing Towards Goal  Note: Out on unit engaged and smiling. Reports hope and anticipation of discharge to home. Denies SI, demonstrates appropriate behaviors and ability to follow staff direction. Daily goal is to d/c home.  Staff focus is on d/c planning   Goal: *STG: Verbalizes anger, guilt, and other feelings in a constructive manor  Outcome: Progressing Towards Goal  Goal: *STG: Attends activities and groups  Outcome: Progressing Towards Goal  Goal: *STG: Demonstrates reduction in symptoms and increase in insight into coping skills/future focused  Outcome: Progressing Towards Goal  Goal: *STG: Complies with medication therapy  Outcome: Progressing Towards Goal  Goal: Interventions  Outcome: Progressing Towards Goal

## 2020-08-11 NOTE — INTERDISCIPLINARY ROUNDS
Behavioral Health Interdisciplinary Rounds Patient Name: Giovanni Gamino  Age: 62 y.o. Room/Bed:  725/02 Primary Diagnosis: <principal problem not specified> Admission Status: Voluntary Readmission within 30 days: no 
Power of  in place: no 
Patient requires a blocked bed: no          Reason for blocked bed: VTE Prophylaxis: No 
 
Mobility needs/Fall risk: no 
Flu Vaccine :   
Nutritional Plan: yes Consults:         
Labs/Testing due today?: no 
 
Sleep hours:  7.5 Participation in Care/Groups:  yes Medication Compliant?: Yes PRNS (last 24 hours): None Restraints (last 24 hours):  no 
  
CIWA (range last 24 hours): COWS (range last 24 hours): Alcohol screening (AUDIT) completed -   AUDIT Score: 0 If applicable, date SBIRT discussed in treatment team AND documented:  
AUDIT Screen Score: AUDIT Score: 0 Document Brief Intervention (corresponds directly with the 5 A's, Ask, Advise, Assess, Assist, and Arrange): At- Risk Patients (Score 7-15 for women; 8-15 for men) Discuss concern patient is drinking at unhealthy levels known to increase risk of alcohol-related health problems. Is Patient ready to commit to change? If No: 
? Encourage reflection ? Discuss short term and long term health risks of consuming alcohol ? Barriers to change ? Reaffirm willingness to help / Educational materials provided If Yes: 
? Set goal 
? Plan 
? Educational materials provided Harmful use or Dependence (Score 16 or greater) ? Discuss short term and long term health risks of consuming alcohol ? Recommendations ? Negotiate drinking goal 
? Recommend addiction specialist/center ? Arrange follow-up appointments. Tobacco - patient is a smoker: Have You Used Tobacco in the Past 30 Days: Yes Illegal Drugs use: Have You Used Any Illegal Substances Over the Past 12 Months: No 
 
24 hour chart check complete: yes Patient goal(s) for today: Treatment team focus/goals:  
Progress note LOS:  2  Expected LOS:  
 
Financial concerns/prescription coverage:   
Family contact:      
Family requesting physician contact today:  no 
Discharge plan: Access to weapons : yes Outpatient provider(s):  
Patient's preferred phone number for follow up call :  
 
Participating treatment team members: Teto Brandt, * (assigned SW),

## 2020-08-11 NOTE — BH NOTES
Behavioral Health Interdisciplinary Rounds     Patient Name: Debbie Mcardle  Age: 62 y.o. Room/Bed:  725/  Primary Diagnosis: <principal problem not specified>   Admission Status:      Readmission within 30 days:   Power of  in place:   Patient requires a blocked bed:           Reason for blocked bed:     VTE Prophylaxis:     Mobility needs/Fall risk: Flu Vaccine :    Nutritional Plan:   Consults: **         Labs/Testing due today?:     Sleep hours:        Participation in Care/Groups:    Medication Compliant?:   PRNS (last 24 hours):     Restraints (last 24 hours):       CIWA (range last 24 hours):     COWS (range last 24 hours):      Alcohol screening (AUDIT) completed -   AUDIT Score: 0     If applicable, date SBIRT discussed in treatment team AND documented:   AUDIT Screen Score: AUDIT Score: 0      Document Brief Intervention (corresponds directly with the 5 A's, Ask, Advise, Assess, Assist, and Arrange): At- Risk Patients (Score 7-15 for women; 8-15 for men)  Discuss concern patient is drinking at unhealthy levels known to increase risk of alcohol-related health problems. Is Patient ready to commit to change? If No:   Encourage reflection   Discuss short term and long term health risks of consuming alcohol   Barriers to change   Reaffirm willingness to help / Educational materials provided  If Yes:   Set goal  Tizra provided    Harmful use or Dependence (Score 16 or greater)   Discuss short term and long term health risks of consuming alcohol   Recommendations   Negotiate drinking goal   Recommend addiction specialist/center   Arrange follow-up appointments.     Tobacco - patient is a smoker: Have You Used Tobacco in the Past 30 Days: Yes  Illegal Drugs use: Have You Used Any Illegal Substances Over the Past 12 Months: No    24 hour chart check complete:      Patient goal(s) for today: prepare for discharge  Treatment team focus/goals: reconcile medications and facilitate discharge  Progress note: Pt is alert, oriented, clam, cooperative and psychiatrically stable for discharge. LOS:  3  Expected LOS:     Financial concerns/prescription coverage:  [employer] MEDICARE/[employer] MEDICARE HMO  Family contact:  brother Gerardo Briceño [phone]; Diaz Boone [phone]                           Family requesting physician contact today:  No   Discharge plan: Today to brothryan's house.    Access to weapons :  No                                                          Outpatient provider(s): SAYRA Meier [phone]  Patient's preferred phone number for follow up call: [phone]     Participating treatment team members: Atiya Brown MSW; Tristan Salazar, DARRIN; Archana Foster, PharmD; Wiliam Tsai, Rn.

## 2020-08-11 NOTE — DISCHARGE SUMMARY
PSYCHIATRIC DISCHARGE SUMMARY    Patient: Arielle Washburn MRN: [id number]  SSN: [id number]    YOB: 1960  Age: 62 y.o. Sex: male        Date of Admission: 8/8/2020  Date of Discharge:8/11/2020      Type of Discharge:  REGULAR     Admission data:  CHIEF COMPLAINT:  \"I have been feeling increasingly depressed. \"     HISTORY OF PRESENTING ILLNESS:  The patient is a 21-year-old male who is currently admitted at [address] [place] [address] on a voluntary basis. This patient was known to this provider and to this facility. He was last under my care few months ago after a suicide attempt after an overdosed on Motrin, metformin, amlodipine. He was in the medical unit for a few days and was cleared for medical transfer here at [facility]. He was initially at [address] [place] [address] Emergency Department and was endorsing thoughts of suicide. While he was in the emergency room, he was found to be septic, hypotensive, tachycardic, and to have RILEY. He was then transferred here at [facility] for further evaluation and treatment. He states that he has been feeling increasingly depressed, hopeless, and helpless, that he developed thoughts of suicide. He was not able to commit to safety. He was endorsing thoughts of suicide with a plan of filling up the bathtub to drown himself. He also states that he lost all his medications about a week ago. His urine drug screen was positive for cocaine. He states that since his discharge here in the hospital, he had the phone interview with the [facility] and arrangements were made for his records to be transferred to a CSB in [facility] but it was never done. His primary care physician, Dr. Janell Mendosa is currently managing his medication. He is endorsing auditory hallucinations, voices telling him to kill himself. He currently denies suicidal ideation, homicidal ideation, but he is endorsing auditory hallucination.   He is admitted to the inpatient psychiatric unit for further stabilization and treatment.     PAST MEDICAL HISTORY:  See H and P.     Labs: (reviewed/updated 8/10/2020)  Patient Vitals for the past 8 hrs:    BP Temp Pulse Resp SpO2   08/10/20 0735 (!) 126/95 97.9 °F (36.6 °C) 84 16 97 %           Labs Reviewed   GLUCOSE, POC - Abnormal; Notable for the following components:       Result Value      Glucose (POC) 126 (*)       All other components within normal limits   GLUCOSE, POC - Abnormal; Notable for the following components:     Glucose (POC) 161 (*)       All other components within normal limits   GLUCOSE, POC   GLUCOSE, POC   GLUCOSE, POC           Lab Results   Component Value Date/Time     Sodium 138 08/06/2020 04:37 AM     Potassium 3.9 08/06/2020 04:37 AM     Chloride 106 08/06/2020 04:37 AM     CO2 25 08/06/2020 04:37 AM     Anion gap 7 08/06/2020 04:37 AM     Glucose 90 08/06/2020 04:37 AM     BUN 15 08/06/2020 04:37 AM     Creatinine 0.93 08/06/2020 04:37 AM     BUN/Creatinine ratio 16 08/06/2020 04:37 AM     GFR est AA >60 08/06/2020 04:37 AM     GFR est non-AA >60 08/06/2020 04:37 AM     Calcium 8.6 08/06/2020 04:37 AM     Bilirubin, total 0.3 08/05/2020 01:20 AM     Alk.  phosphatase 60 08/05/2020 01:20 AM     Protein, total 6.6 08/05/2020 01:20 AM     Albumin 3.3 (L) 08/05/2020 01:20 AM     Globulin 3.3 08/05/2020 01:20 AM     A-G Ratio 1.0 (L) 08/05/2020 01:20 AM     ALT (SGPT) 32 08/05/2020 01:20 AM             Admission on 08/08/2020   Component Date Value Ref Range Status    Glucose (POC) 08/09/2020 126* 65 - 100 mg/dL Final    Performed by 08/09/2020 Farshad Martinez    Final    Glucose (POC) 08/09/2020 161* 65 - 100 mg/dL Final    Performed by 08/09/2020 Rosalba Avila    Final    Glucose (POC) 08/09/2020 91  65 - 100 mg/dL Final    Performed by 08/09/2020 Ely Cobia    Final    Glucose (POC) 08/09/2020 99  65 - 100 mg/dL Final    Performed by 08/09/2020 Ely Victor    Final    Glucose (POC) 08/10/2020 98  65 - 100 mg/dL Final    Performed by 08/10/2020 CARTER JC (PCT)    Final            Vitals:     08/09/20 1101 08/09/20 1642 08/09/20 2038 08/10/20 0735   BP: 126/84 101/69 116/74 (!) 126/95   Pulse: 82 86 89 84   Resp: 16 16 16 16   Temp: 98.1 °F (36.7 °C) 97.7 °F (36.5 °C) 98 °F (36.7 °C) 97.9 °F (36.6 °C)   SpO2: 98% 97% 97% 97%   Weight:           Height:             Recent Results         Recent Results (from the past 24 hour(s))   GLUCOSE, POC     Collection Time: 08/09/20 11:07 AM   Result Value Ref Range     Glucose (POC) 161 (H) 65 - 100 mg/dL     Performed by Ragini Toribio     GLUCOSE, POC     Collection Time: 08/09/20  4:41 PM   Result Value Ref Range     Glucose (POC) 91 65 - 100 mg/dL     Performed by William Frazier     GLUCOSE, POC     Collection Time: 08/09/20  8:32 PM   Result Value Ref Range     Glucose (POC) 99 65 - 100 mg/dL     Performed by William Frazier     GLUCOSE, POC     Collection Time: 08/10/20  6:56 AM   Result Value Ref Range     Glucose (POC) 98 65 - 100 mg/dL     Performed by Raji Lu (PCT)             RADIOLOGY REPORTS:       Results from Hospital Encounter encounter on 08/04/20   XR CHEST PORT     Narrative EXAM: XR CHEST PORT     INDICATION: Chest Pain     COMPARISON: 7/29/2015 through 12/1/2014.     FINDINGS: A portable AP radiograph of the chest was obtained at 17:12 hours. The  patient is on a cardiac monitor. The lungs are clear. The cardiac and  mediastinal contours and pulmonary vascularity are normal.  The chest wall  structures and visualized upper abdomen show no acute findings with incidental  note of degenerative spine and shoulder changes.         Impression IMPRESSION: No acute findings. Xr Spine Lumb Min 4 V     Result Date: 5/21/2020  EXAM:  Lumbar spine INDICATION:  2/9/2019 COMPARISON: None. FINDINGS: AP, lateral, bilateral oblique and spot lateral views of the lumbar spine demonstrate normal alignment. The vertebral body heights and disc spaces are well-preserved.    There is no spondylolysis or spondylolisthesis. There is no fracture, subluxation or other abnormality. Facet arthropathy is noted at the lower 2 levels, asymmetric to the left. Spondylitic changes most pronounced at L1-2.      IMPRESSION:  Lower lumbar facet arthropathy. No acute process identified      Xr Knee Lt Min 4 V     Result Date: 5/21/2020  EXAM: XR KNEE LT MIN 4 V INDICATION: knee pain. COMPARISON: None. FINDINGS: Four views of the left knee demonstrate no fracture or other acute osseous or articular abnormality. There is a small joint effusion.      IMPRESSION: Small left knee effusion     Xr Knee Rt Min 4 V     Result Date: 5/21/2020  EXAM: XR KNEE RT MIN 4 V INDICATION: Right knee pain. COMPARISON: None. FINDINGS: Four views of the right knee demonstrate no fracture or other acute osseous or articular abnormality. There is no effusion. There is medial joint space narrowing with osteophytosis.      IMPRESSION: No acute abnormality. Mild medial compartment osteoarthritis.     Xr Ankle Lt Min 3 V     Result Date: 5/30/2020  EXAM: XR ANKLE LT MIN 3 V INDICATION: Left ankle pain since fall and injury one day ago. COMPARISON: None. FINDINGS: Three views of the left ankle demonstrate no fracture or disruption of the ankle mortise. There is no other acute osseous or articular abnormality. The soft tissues are within normal limits. Joints are within normal limits.      IMPRESSION: Normal left ankle views.     Xr Foot Lt Min 3 V     Result Date: 5/30/2020  EXAM: XR FOOT LT MIN 3 V INDICATION: Left foot pain since injury one day ago. COMPARISON: None. FINDINGS: Three views of the left foot demonstrate no fracture or other acute osseous or articular abnormality. The soft tissues are within normal limits. Mild first MTP joint osteoarthritis.      IMPRESSION: No acute abnormality.     Us Retroperitoneum Comp     Result Date: 6/12/2020  RENAL ULTRASOUND INDICATION: Acute renal failure COMPARISON: None.  TECHNIQUE: Sonography of the kidneys, retroperitoneum, and bladder was performed. FINDINGS: RIGHT KIDNEY: 12.6 cm in length. Normal cortical echogenicity. No hydronephrosis, shadowing calculus, or contour-deforming renal mass. LEFT KIDNEY: 11.0 cm in length. Normal cortical echogenicity. No hydronephrosis, shadowing calculus, or contour-deforming renal mass. AORTA: Obscured by overlying bowel gas. COMMON ILIAC ARTERIES: Obscured by overlying bowel gas. IVC: Obscured by overlying bowel gas. BLADDER: Normally distended.      IMPRESSION: Normal renal ultrasound.      Xr Chest Port     Result Date: 8/4/2020  EXAM: XR CHEST PORT INDICATION: Chest Pain COMPARISON: 7/29/2015 through 12/1/2014. FINDINGS: A portable AP radiograph of the chest was obtained at 17:12 hours. The patient is on a cardiac monitor. The lungs are clear. The cardiac and mediastinal contours and pulmonary vascularity are normal.  The chest wall structures and visualized upper abdomen show no acute findings with incidental note of degenerative spine and shoulder changes.      IMPRESSION: No acute findings.     Xr Chest Port     Result Date: 6/11/2020  EXAM: XR CHEST PORT INDICATION: overdose. COMPARISON: 2015 FINDINGS: A portable AP radiograph of the chest was obtained at 0626 hours. The patient is on a cardiac monitor. The lungs are clear. There is atherosclerosis of the aorta. The bones and soft tissues are grossly within normal limits.      IMPRESSION: No acute findings.           PAST PSYCHIATRIC HISTORY:  He was admitted here under my care 2 months ago for depression and suicidality. He was also admitted at [facility].  He is currently on Zoloft and Fleta [place] and is currently being prescribed by his primary care physician.     PSYCHOSOCIAL HISTORY:  He is single. He has never been . He has 3 children. He is currently receiving social security disability checks. He was in the army for 2-1/2 years.   He lives with his brother.     MENTAL STATUS EXAMINATION:  He is alert and oriented in all spheres. He is dressed in hospital apparel. He is calm and pleasant during the interview. He reports his mood is okay. Affect is reactive. Speech, normal rate and rhythm. Thought process, logical and goal directed. He denies suicidal ideation, homicidal ideation, but he is endorsing auditory hallucination. Memory seems intact. Intelligence seems average. Insight is partial.  Judgment is poor.     DIAGNOSES:  Major depressive disorder, recurrent, severe with psychotic features. Cocaine use disorder, unspecified. Hospital Course:    Patient was admitted to the Psychiatric services for acute psychiatric stabilization in regards to symptomatology as described in the HPI above and placed on Q15 minute checks and suicide precautions. He was started back on his usual medication regimen as well as PRN medications including norvasc, asa, gabapentin, zoloft, latuda, lipitor. Zoloft and Latuda were both increased. While on the unit Lillie Padilla was involved in individual, group, occupational and milieu therapy. He improved gradually and was able to integrate into the milieu with help from the nursing staff. Patients symptoms improved gradually including depression, suicidal thoughts, anxiety. He was appropriate in his interactions, and cooperative with medications and the unit routine. Please see individual progress notes for more specific details regarding patient's hospitalization course. Patient was discharged as per the plan. He had been doing well on the unit as per the report of the nursing staff and my observations. No PRN medication for agitation, seclusion or restraints were required during the last 48 hours of his stay. Lillie Padilla had improved progressively to the point of being stable for discharge and outpatient FU. At this time he did not offer any complaints. Patient denied any SI or HI. Denied any AH or VH.  He denied any delusions. Was not considered a danger to self or to others and is safe for discharge. Will FU with his appointments and remains motivated to be in treatment. The patient verbalized understanding of his discharge instructions. Some parts of the discharge summary are from the initial Psychiatric interview that was done on admission by the admitting psychiatrist.       Allergies:(reviewed/updated 8/11/2020)  Allergies   Allergen Reactions    Tramadol Nausea and Vomiting    Lisinopril Cough     Developed cough while taking lisinopril       Side Effects: (reviewed/updated 8/11/2020)  None reported or admitted to.     Vital Signs:  Patient Vitals for the past 24 hrs:   Temp Pulse Resp BP SpO2   08/11/20 0715 97.6 °F (36.4 °C) 87 16 126/77 98 %   08/10/20 2023 97.6 °F (36.4 °C) 88 16 112/70    08/10/20 1635 98 °F (36.7 °C) 88 16 110/71    08/10/20 1131 97.7 °F (36.5 °C) 91 16 128/82 98 %     Wt Readings from Last 3 Encounters:   08/09/20 89.5 kg (197 lb 4.8 oz)   08/05/20 92.6 kg (204 lb 2.3 oz)   06/29/20 88.9 kg (196 lb)     Temp Readings from Last 3 Encounters:   08/11/20 97.6 °F (36.4 °C)   08/08/20 98.1 °F (36.7 °C)   08/04/20 99 °F (37.2 °C)     BP Readings from Last 3 Encounters:   08/11/20 126/77   08/08/20 139/88   08/04/20 121/74     Pulse Readings from Last 3 Encounters:   08/11/20 87   08/08/20 82   08/04/20 (!) 112       Labs: (reviewed/updated 8/11/2020)  Recent Results (from the past 24 hour(s))   GLUCOSE, POC    Collection Time: 08/10/20 11:17 AM   Result Value Ref Range    Glucose (POC) 95 65 - 100 mg/dL    Performed by [facility] (CRISTY)    GLUCOSE, POC    Collection Time: 08/10/20  4:07 PM   Result Value Ref Range    Glucose (POC) 122 (H) 65 - 100 mg/dL    Performed by Efra, POC    Collection Time: 08/10/20  8:16 PM   Result Value Ref Range    Glucose (POC) 101 (H) 65 - 100 mg/dL    Performed by Nahomy Jacobs, POC    Collection Time: 08/11/20 6:59 AM   Result Value Ref Range    Glucose (POC) 98 65 - 100 mg/dL    Performed by Rhonda Montero (PCT)      No results found for: VALF2, VALAC, VALP, VALPR, DS6, CRBAM, CRBAMP, CARB2, XCRBAM  No results found for: [facility] EVALUATION [place]    Radiology (reviewed/updated 8/11/2020)  Xr Spine Lumb Min 4 V    Result Date: 5/21/2020  EXAM:  Lumbar spine INDICATION:  2/9/2019 COMPARISON: None. FINDINGS: AP, lateral, bilateral oblique and spot lateral views of the lumbar spine demonstrate normal alignment. The vertebral body heights and disc spaces are well-preserved. There is no spondylolysis or spondylolisthesis. There is no fracture, subluxation or other abnormality. Facet arthropathy is noted at the lower 2 levels, asymmetric to the left. Spondylitic changes most pronounced at L1-2. IMPRESSION:  Lower lumbar facet arthropathy. No acute process identified     Xr Knee Lt Min 4 V    Result Date: 5/21/2020  EXAM: XR KNEE LT MIN 4 V INDICATION: knee pain. COMPARISON: None. FINDINGS: Four views of the left knee demonstrate no fracture or other acute osseous or articular abnormality. There is a small joint effusion. IMPRESSION: Small left knee effusion    Xr Knee Rt Min 4 V    Result Date: 5/21/2020  EXAM: XR KNEE RT MIN 4 V INDICATION: Right knee pain. COMPARISON: None. FINDINGS: Four views of the right knee demonstrate no fracture or other acute osseous or articular abnormality. There is no effusion. There is medial joint space narrowing with osteophytosis. IMPRESSION: No acute abnormality. Mild medial compartment osteoarthritis. Xr Ankle Lt Min 3 V    Result Date: 5/30/2020  EXAM: XR ANKLE LT MIN 3 V INDICATION: Left ankle pain since fall and injury one day ago. COMPARISON: None. FINDINGS: Three views of the left ankle demonstrate no fracture or disruption of the ankle mortise. There is no other acute osseous or articular abnormality. The soft tissues are within normal limits. Joints are within normal limits. IMPRESSION: Normal left ankle views. Xr Foot Lt Min 3 V    Result Date: 5/30/2020  EXAM: XR FOOT LT MIN 3 V INDICATION: Left foot pain since injury one day ago. COMPARISON: None. FINDINGS: Three views of the left foot demonstrate no fracture or other acute osseous or articular abnormality. The soft tissues are within normal limits. Mild first MTP joint osteoarthritis. IMPRESSION: No acute abnormality. Us Retroperitoneum Comp    Result Date: 6/12/2020  RENAL ULTRASOUND INDICATION: Acute renal failure COMPARISON: None. TECHNIQUE: Sonography of the kidneys, retroperitoneum, and bladder was performed. FINDINGS: RIGHT KIDNEY: 12.6 cm in length. Normal cortical echogenicity. No hydronephrosis, shadowing calculus, or contour-deforming renal mass. LEFT KIDNEY: 11.0 cm in length. Normal cortical echogenicity. No hydronephrosis, shadowing calculus, or contour-deforming renal mass. AORTA: Obscured by overlying bowel gas. COMMON ILIAC ARTERIES: Obscured by overlying bowel gas. IVC: Obscured by overlying bowel gas. BLADDER: Normally distended. IMPRESSION: Normal renal ultrasound. Xr Chest Port    Result Date: 8/4/2020  EXAM: XR CHEST PORT INDICATION: Chest Pain COMPARISON: 7/29/2015 through 12/1/2014. FINDINGS: A portable AP radiograph of the chest was obtained at 17:12 hours. The patient is on a cardiac monitor. The lungs are clear. The cardiac and mediastinal contours and pulmonary vascularity are normal.  The chest wall structures and visualized upper abdomen show no acute findings with incidental note of degenerative spine and shoulder changes. IMPRESSION: No acute findings. Xr Chest Port    Result Date: 6/11/2020  EXAM: XR CHEST PORT INDICATION: overdose. COMPARISON: 2015 FINDINGS: A portable AP radiograph of the chest was obtained at 0626 hours. The patient is on a cardiac monitor. The lungs are clear. There is atherosclerosis of the aorta.   The bones and soft tissues are grossly within normal limits. IMPRESSION: No acute findings. Mental Status Exam on Discharge:  General appearance:   Kita Brower is a 62 y.o. BLACK OR  male who is well groomed, psychomotor activity is WNL  Eye contact: makes good eye contact  Speech: Spontaneous and coherent  Affect : Euthymic  Mood: \"OK\"  Thought Process: Logical, goal directed  Perception: Denies any AH or VH. Thought Content: Denies any SI or Plan  Insight: Partial  Judgement: Fair  Cognition: Intact grossly. Discharge Diagnoses:   Major depressive disorder, recurrent, severe with psychotic features. Cocaine use disorder, unspecified. Current Discharge Medication List      START taking these medications    Details   famotidine (PEPCID) 20 mg tablet Take 1 Tab by mouth two (2) times a day. Indications: gastroesophageal reflux disease  Qty: 60 Tab, Refills: 0         CONTINUE these medications which have CHANGED    Details   valsartan (DIOVAN) 160 mg tablet Take 1 Tab by mouth daily. Indications: high blood pressure  Qty: 14 Tab, Refills: 0      lurasidone (LATUDA) 40 mg tab tablet Take 1 tablet with dinner take it with 350 calories of meals  Indications: bipolar depression  Qty: 1 Tab, Refills: 0      sertraline (ZOLOFT) 50 mg tablet Take 3 Tabs by mouth daily. Indications: anxiousness associated with depression  Qty: 90 Tab, Refills: 0         CONTINUE these medications which have NOT CHANGED    Details   Blood Pressure Test Kit-Large kit 1 Kit by Does Not Apply route Once every 2 weeks. Indications: dx I10  Qty: 1 Kit, Refills: 0    Associated Diagnoses: Essential hypertension      glucose blood VI test strips (Accu-Chek Bea Plus test strp) strip 1 Each by Does Not Apply route three (3) times daily. Check 3 times daily      lancets misc Dx:e11.9, not on insulin; use to check sugar daily as directed.   Qty: 100 Each, Refills: 3    Associated Diagnoses: Controlled type 2 diabetes mellitus with diabetic nephropathy, without long-term current use of insulin ([facility])      metFORMIN (GLUCOPHAGE) 1,000 mg tablet Take 1 Tab by mouth two (2) times daily (with meals). Indications: type 2 diabetes mellitus  Qty: 180 Tab, Refills: 3    Comments: Simeon pt  Associated Diagnoses: Controlled type 2 diabetes mellitus without complication, without long-term current use of insulin ([facility])      gabapentin (NEURONTIN) 400 mg capsule Take 1 Cap by mouth three (3) times daily. Max Daily Amount: 1,200 mg. Indications: neuropathic pain  Qty: 90 Cap, Refills: 2    Associated Diagnoses: Numbness and tingling of right arm; Schizoaffective disorder, depressive type ([facility])      furosemide (LASIX) 40 mg tablet Take 1 Tab by mouth daily. .  Indications: pressure and fluid  Qty: 90 Tab, Refills: 3    Associated Diagnoses: Essential hypertension      diclofenac (VOLTAREN) 1 % gel AAA in thin coat BID as needed for painful joints  Indications: joint damage causing pain and loss of function  Qty: 100 g, Refills: 11    Associated Diagnoses: Arthritis      atorvastatin (LIPITOR) 10 mg tablet Take 1 Tab by mouth daily. Indications: prevent stroke  Qty: 90 Tab, Refills: 3    Associated Diagnoses: Mixed hyperlipidemia      amLODIPine (NORVASC) 5 mg tablet Take 1 Tab by mouth daily. Indications: pressure  Qty: 90 Tab, Refills: 3    Associated Diagnoses: Essential hypertension      aspirin delayed-release 81 mg tablet Take 1 Tab by mouth daily. Indications: prevent heart problem  Qty: 100 Tab, Refills: 1    Comments: Simeon pt  Associated Diagnoses: Essential hypertension                  Follow-up Information     Follow up With Specialties Details Why Saran Baptiste MD   Go on 8/17/2020 at [facility].  [address], [facility], [address] [place] [address]  [phone]    [facility]  On 8/14/2020 8:30am over the phone.   [address], [facility]  [phone]    Ludwin Stephenson MD Family Medicine   [address]  [address] [facility],[address] [address] [phone] WOUND CARE: none needed. Prognosis:   Good / Mollie Innocent based on nature of patient's pathology/ies and treatment compliance issues. Prognosis is greatly dependent upon patient's ability to  follow up on psychiatric/psychotherapy appointments as well as to comply with psychiatric medications as prescribed. I certify that this patients inpatient psychiatric hospital services furnished since the previous certification were, and continue to be, required for treatment that could reasonably be expected to improve the patient's condition, or for diagnostic study, and that the patient continues to need, on a daily basis, active treatment furnished directly by or requiring the supervision of inpatient psychiatric facility personnel. In addition, the hospital records show that services furnished were intensive treatment services, admission or related services, or equivalent services.      Signed:  Gloria Mitchell NP  8/11/2020

## 2020-08-11 NOTE — GROUP NOTE
LISBETH  GROUP DOCUMENTATION INDIVIDUAL Group Therapy Note Date: 8/11/2020 Group Start Time: 1100 Group End Time: 8698 Group Topic: [employer] [address] Kimmie Woodard [facility] GROUP DOCUMENTATION GROUP Group Therapy Note Attendees: 11/13 Attendance: Attended Patient's Goal: Fix things up at home Interventions/techniques: Challenged, Provide feedback and Supported Follows Directions: Followed directions Interactions: Interacted appropriately Mental Status: Calm Behavior/appearance: Attentive and Cooperative Goals Achieved: Able to engage in interactions and Able to listen to others Additional Notes:  Staff met with group to discuss treatment team and services provided on the unit. Staff addressed any questions and concerns that group had. Staff then discussed importance of setting goals and asked group to share their daily goals with the group. Haris Hammer

## 2020-08-11 NOTE — DISCHARGE INSTRUCTIONS
DISCHARGE SUMMARY    NAME:Go Phan  : 1960  MRN: [id number]    The patient Cleatus Peak exhibits the ability to control behavior in a less restrictive environment. Patient's level of functioning is improving. No assaultive/destructive behavior has been observed for the past 24 hours. No suicidal/homicidal threat or behavior has been observed for the past 24 hours. There is no evidence of serious medication side effects. Patient has not been in physical or protective restraints for at least the past 24 hours. If weapons involved, how are they secured? No     Is patient aware of and in agreement with discharge plan? Yes     Arrangements for medication:  Prescriptions filled at [facility]. Copy of discharge instructions to provider?:  Dr. Erin Blue [phone]; [facility] [phone]    Arrangements for transportation home: [facility] provide transportation. Keep all follow up appointments as scheduled, continue to take prescribed medications per physician instructions. Mental health crisis number:  [phone] or your local mental health crisis line number at [phone]. DISCHARGE SUMMARY from Nurse    PATIENT INSTRUCTIONS:      What to do at Home:  Recommended activity: Activity as tolerated,     If you experience any of the following symptoms thoughts of harming self, feeling overwhelmed with hopelessness and despair, please follow up with staff at [facility] counseling center and your local crisis number at [phone]. *  Please give a list of your current medications to your Primary Care Provider. *  Please update this list whenever your medications are discontinued, doses are      changed, or new medications (including over-the-counter products) are added. *  Please carry medication information at all times in case of emergency situations.     These are general instructions for a healthy lifestyle:    No smoking/ No tobacco products/ Avoid exposure to second hand smoke  Surgeon General's Warning:  Quitting smoking now greatly reduces serious risk to your health. Obesity, smoking, and sedentary lifestyle greatly increases your risk for illness    A healthy diet, regular physical exercise & weight monitoring are important for maintaining a healthy lifestyle    You may be retaining fluid if you have a history of heart failure or if you experience any of the following symptoms:  Weight gain of 3 pounds or more overnight or 5 pounds in a week, increased swelling in our hands or feet or shortness of breath while lying flat in bed. Please call your doctor as soon as you notice any of these symptoms; do not wait until your next office visit. The discharge information has been reviewed with the patient. The patient verbalized understanding. Discharge medications reviewed with the patient and appropriate educational materials and side effects teaching were provided.   ___________________________________________________________________________________________________________________________________

## 2020-08-11 NOTE — BH NOTES
Behavioral Health Transition Record to Provider    Patient Name: Benedict Dominguez  YOB: 1960  Medical Record Number: [id number]  Date of Admission: 8/8/2020  Date of Discharge: 8/11/2020    Attending Provider: Rodolfo Otoole MD  Discharging Provider: David Hart NP   To contact this individual call [phone] and ask the  to page. If unavailable, ask to be transferred to [address] [place] [address] on call. [facility] Provider will be available on call 24/7 and during holidays. Primary Care Provider: Quinten Seymour MD    Allergies   Allergen Reactions    Tramadol Nausea and Vomiting    Lisinopril Cough     Developed cough while taking lisinopril       Reason for Admission:     CHIEF COMPLAINT:  \"I have been feeling increasingly depressed. \"     HISTORY OF PRESENTING ILLNESS:  The patient is a 60-year-old male who is currently admitted at [address] [place] [address] on a voluntary basis.  This patient was known to this provider and to this facility. Sukhjinder Dupont was last under my care few months ago after a suicide attempt after an overdosed on Motrin, metformin, amlodipine. Sukhjinder Dupont was in the medical unit for a few days and was cleared for medical transfer here at [facility] was initially at [facility] and was endorsing thoughts of suicide. Chiqui Berumenow he was in the emergency room, he was found to be septic, hypotensive, tachycardic, and to have RILEY. Sukhjinder Dupont was then transferred here at [facility] for further evaluation and treatment. Sukhjinder Dupont states that he has been feeling increasingly depressed, hopeless, and helpless, that he developed thoughts of suicide. Sukhjinder Dupont was not able to commit to [address] [place] [address] was endorsing thoughts of suicide with a plan of filling up the bathtub to drown himself. Sukhjinder Dupont also states that he lost all his medications about a week ago. [facility] urine drug screen was positive for cocaine. Sukhjinder Dupont states that since his discharge here in the hospital, he had the phone interview with the [address] arrangements were made for his records to be transferred to a [facility] in [facility] it was never done. [facility] primary care physician, Dr. Cheri Diez is currently managing his medication. Jamia Preciado is endorsing auditory hallucinations, voices telling him to kill himself. Jamia Preciado currently denies suicidal ideation, homicidal ideation, but he is endorsing auditory hallucination. Jamia Preciado is admitted to the inpatient psychiatric unit for further stabilization and treatment.     Admission Diagnosis: Schizoaffective disorder ([facility] [address] [F25.9]    * No surgery found *    Results for orders placed or performed during the hospital encounter of 08/08/20   GLUCOSE, POC   Result Value Ref Range    Glucose (POC) 126 (H) 65 - 100 mg/dL    Performed by Evelin Meade    GLUCOSE, POC   Result Value Ref Range    Glucose (POC) 161 (H) 65 - 100 mg/dL    Performed by Robyn Beckman    GLUCOSE, POC   Result Value Ref Range    Glucose (POC) 91 65 - 100 mg/dL    Performed by Edith Mas    GLUCOSE, POC   Result Value Ref Range    Glucose (POC) 99 65 - 100 mg/dL    Performed by Edith Mas    GLUCOSE, POC   Result Value Ref Range    Glucose (POC) 98 65 - 100 mg/dL    Performed by Jade Flores (PCT)    GLUCOSE, POC   Result Value Ref Range    Glucose (POC) 95 65 - 100 mg/dL    Performed by Jade Flores (PCT)    GLUCOSE, POC   Result Value Ref Range    Glucose (POC) 122 (H) 65 - 100 mg/dL    Performed by Efra POC   Result Value Ref Range    Glucose (POC) 101 (H) 65 - 100 mg/dL    Performed by Edith Mas    GLUCOSE, POC   Result Value Ref Range    Glucose (POC) 98 65 - 100 mg/dL    Performed by CARTER JC (PCT)    GLUCOSE, POC   Result Value Ref Range    Glucose (POC) 79 65 - 100 mg/dL    Performed by Arturo Pardo        Immunizations administered during this encounter:   Immunization History   Administered Date(s) Administered    Influenza Vaccine PF 12/02/2014    Pneumococcal Polysaccharide (PPSV-23) 12/02/2014       Screening for Metabolic Disorders for Patients on Antipsychotic Medications  (Data obtained from the EMR)    Estimated Body Mass Index  Estimated body mass index is 26.76 kg/m² as calculated from the following:    Height as of this encounter: 6' (1.829 m). Weight as of this encounter: 89.5 kg (197 lb 4.8 oz). Vital Signs/Blood Pressure  Visit Vitals  /77   Pulse 87   Temp 97.6 °F (36.4 °C)   Resp 16   Ht 6' (1.829 m)   Wt 89.5 kg (197 lb 4.8 oz)   SpO2 98%   BMI 26.76 kg/m²       Blood Glucose/Hemoglobin A1c  Lab Results   Component Value Date/Time    Glucose 90 08/06/2020 04:37 AM    Glucose (POC) 79 08/11/2020 11:12 AM       Lab Results   Component Value Date/Time    Hemoglobin A1c 6.7 (H) 06/12/2020 03:53 PM        Lipid Panel  Lab Results   Component Value Date/Time    Cholesterol, total 169 06/16/2020 04:01 AM    HDL Cholesterol 42 06/16/2020 04:01 AM    LDL, calculated 96.2 06/16/2020 04:01 AM    Triglyceride 154 (H) 06/16/2020 04:01 AM    CHOL/HDL Ratio 4.0 06/16/2020 04:01 AM        Discharge Diagnosis: Major depressive disorder, recurrent, severe with psychotic features.  Cocaine use disorder, unspecified.       Discharge Plan: The patient Tuan Forrest exhibits the ability to control behavior in a less restrictive environment. Patient's level of functioning is improving. No assaultive/destructive behavior has been observed for the past 24 hours. No suicidal/homicidal threat or behavior has been observed for the past 24 hours. There is no evidence of serious medication side effects. Patient has not been in physical or protective restraints for at least the past 24 hours. If weapons involved, how are they secured? No     Is patient aware of and in agreement with discharge plan? Yes     Arrangements for medication:  Prescriptions filled at [facility].      Copy of discharge instructions to provider?:  Dr. Mabel Menon [phone]; [facility] [address] [facility] [phone]    Arrangements for transportation home: [facility] provide transportation. Keep all follow up appointments as scheduled, continue to take prescribed medications per physician instructions. Mental health crisis number:  [phone] or your local mental health crisis line number at [phone]    Discharge Medication List and Instructions:   Current Discharge Medication List      START taking these medications    Details   famotidine (PEPCID) 20 mg tablet Take 1 Tab by mouth two (2) times a day. Indications: gastroesophageal reflux disease  Qty: 60 Tab, Refills: 0         CONTINUE these medications which have CHANGED    Details   valsartan (DIOVAN) 160 mg tablet Take 1 Tab by mouth daily. Indications: high blood pressure  Qty: 14 Tab, Refills: 0      sertraline (ZOLOFT) 50 mg tablet Take 3 Tabs by mouth daily. Indications: anxiousness associated with depression  Qty: 90 Tab, Refills: 0      lurasidone (LATUDA) 120 mg tab tablet Take 1 Tab by mouth daily (with dinner). Take 1 tablet with dinner take it with 350 calories of meals  Indications: bipolar depression  Qty: 30 Tab, Refills: 0         CONTINUE these medications which have NOT CHANGED    Details   Blood Pressure Test Kit-Large kit 1 Kit by Does Not Apply route Once every 2 weeks. Indications: dx I10  Qty: 1 Kit, Refills: 0    Associated Diagnoses: Essential hypertension      glucose blood VI test strips (Accu-Chek Bea Plus test strp) strip 1 Each by Does Not Apply route three (3) times daily. Check 3 times daily      lancets misc Dx:e11.9, not on insulin; use to check sugar daily as directed. Qty: 100 Each, Refills: 3    Associated Diagnoses: Controlled type 2 diabetes mellitus with diabetic nephropathy, without long-term current use of insulin ([facility])      metFORMIN (GLUCOPHAGE) 1,000 mg tablet Take 1 Tab by mouth two (2) times daily (with meals).  Indications: type 2 diabetes mellitus  Qty: 180 Tab, Refills: 3 Comments: Simeon pt  Associated Diagnoses: Controlled type 2 diabetes mellitus without complication, without long-term current use of insulin (H[facility])      gabapentin (NEURONTIN) 400 mg capsule Take 1 Cap by mouth three (3) times daily. Max Daily Amount: 1,200 mg. Indications: neuropathic pain  Qty: 90 Cap, Refills: 2    Associated Diagnoses: Numbness and tingling of right arm; Schizoaffective disorder, depressive type ([facility])      furosemide (LASIX) 40 mg tablet Take 1 Tab by mouth daily. .  Indications: pressure and fluid  Qty: 90 Tab, Refills: 3    Associated Diagnoses: Essential hypertension      diclofenac (VOLTAREN) 1 % gel AAA in thin coat BID as needed for painful joints  Indications: joint damage causing pain and loss of function  Qty: 100 g, Refills: 11    Associated Diagnoses: Arthritis      atorvastatin (LIPITOR) 10 mg tablet Take 1 Tab by mouth daily. Indications: prevent stroke  Qty: 90 Tab, Refills: 3    Associated Diagnoses: Mixed hyperlipidemia      amLODIPine (NORVASC) 5 mg tablet Take 1 Tab by mouth daily. Indications: pressure  Qty: 90 Tab, Refills: 3    Associated Diagnoses: Essential hypertension      aspirin delayed-release 81 mg tablet Take 1 Tab by mouth daily. Indications: prevent heart problem  Qty: 100 Tab, Refills: 1    Comments: Simeon pt  Associated Diagnoses: Essential hypertension             Unresulted Labs (24h ago, onward)    None        To obtain results of studies pending at discharge, please contact [phone]    Follow-up Information     Follow up With Specialties Details Why Tony Lopez MD   Go on 8/17/2020 at [facility].  [address], [facility], [address] [place] [address]  [phone]    [facility]  On 8/14/2020 8:30am over the phone.   [address], [facility]  [phone]    [facility], [address]   [address] [place] [address]  [phone]            Advanced Directive:   Does the patient have an appointed surrogate decision maker? No  Does the patient have a Medical Advance Directive? No  Does the patient have a Psychiatric Advance Directive? No  If the patient does not have a surrogate or Medical Advance Directive AND Psychiatric Advance Directive, the patient was offered information on these advance directives Patient declined to complete    Patient Instructions: Please continue all medications until otherwise directed by physician. Tobacco Cessation Discharge Plan:   Is the patient a smoker and needs referral for smoking cessation? Yes  Patient referred to the following for smoking cessation with an appointment? Refused     Patient was offered medication to assist with smoking cessation at discharge? Refused  Was education for smoking cessation added to the discharge instructions? Yes    Alcohol/Substance Abuse Discharge Plan:   Does the patient have a history of substance/alcohol abuse and requires a referral for treatment? No  Patient referred to the following for substance/alcohol abuse treatment with an appointment? Not applicable  Patient was offered medication to assist with alcohol cessation at discharge? Not applicable  Was education for substance/alcohol abuse added to discharge instructions? Not applicable    Patient discharged to Home; discussed with patient/caregiver and provided to the patient/caregiver either in hard copy or electronically.

## 2020-08-14 NOTE — PROGRESS NOTES
Reached out to patient at [phone] for follow up. Patient's voicemail came on and unable to leave message due to confidentiality.

## 2020-09-25 ENCOUNTER — HOSPITAL ENCOUNTER (INPATIENT)
Age: 60
LOS: 4 days | Discharge: HOME | DRG: 885 | End: 2020-09-29
Attending: EMERGENCY MEDICINE | Admitting: PSYCHIATRY & NEUROLOGY
Payer: MEDICARE

## 2020-09-25 DIAGNOSIS — R45.850 HOMICIDAL IDEATIONS: ICD-10-CM

## 2020-09-25 DIAGNOSIS — R45.851 SUICIDAL IDEATIONS: Primary | ICD-10-CM

## 2020-09-25 DIAGNOSIS — F10.10 ALCOHOL ABUSE: ICD-10-CM

## 2020-09-25 LAB
ALBUMIN SERPL-MCNC: 3.8 G/DL (ref 3.5–5)
ALBUMIN/GLOB SERPL: 1.1 {RATIO} (ref 1.1–2.2)
ALP SERPL-CCNC: 78 U/L (ref 45–117)
ALT SERPL-CCNC: 37 U/L (ref 12–78)
AMPHET UR QL SCN: NEGATIVE
ANION GAP SERPL CALC-SCNC: 11 MMOL/L (ref 5–15)
APPEARANCE UR: CLEAR
AST SERPL-CCNC: 25 U/L (ref 15–37)
BACTERIA URNS QL MICRO: NEGATIVE /HPF
BARBITURATES UR QL SCN: NEGATIVE
BASOPHILS # BLD: 0.1 K/UL (ref 0–0.1)
BASOPHILS NFR BLD: 1 % (ref 0–1)
BENZODIAZ UR QL: NEGATIVE
BILIRUB SERPL-MCNC: 0.2 MG/DL (ref 0.2–1)
BILIRUB UR QL: NEGATIVE
BUN SERPL-MCNC: 23 MG/DL (ref 6–20)
BUN/CREAT SERPL: 15 (ref 12–20)
CALCIUM SERPL-MCNC: 8.7 MG/DL (ref 8.5–10.1)
CANNABINOIDS UR QL SCN: NEGATIVE
CHLORIDE SERPL-SCNC: 99 MMOL/L (ref 97–108)
CO2 SERPL-SCNC: 25 MMOL/L (ref 21–32)
COCAINE UR QL SCN: POSITIVE
COLOR UR: NORMAL
COVID-19 RAPID TEST, COVR: NOT DETECTED
COVID-19, XGCOVT: NOT DETECTED
CREAT SERPL-MCNC: 1.54 MG/DL (ref 0.7–1.3)
DIFFERENTIAL METHOD BLD: ABNORMAL
DRUG SCRN COMMENT,DRGCM: ABNORMAL
EOSINOPHIL # BLD: 0.1 K/UL (ref 0–0.4)
EOSINOPHIL NFR BLD: 1 % (ref 0–7)
EPITH CASTS URNS QL MICRO: NORMAL /LPF
ERYTHROCYTE [DISTWIDTH] IN BLOOD BY AUTOMATED COUNT: 12.8 % (ref 11.5–14.5)
ETHANOL SERPL-MCNC: 218 MG/DL
GLOBULIN SER CALC-MCNC: 3.6 G/DL (ref 2–4)
GLUCOSE BLD STRIP.AUTO-MCNC: 117 MG/DL (ref 65–100)
GLUCOSE SERPL-MCNC: 130 MG/DL (ref 65–100)
GLUCOSE UR STRIP.AUTO-MCNC: NEGATIVE MG/DL
HCT VFR BLD AUTO: 40.4 % (ref 36.6–50.3)
HEALTH STATUS, XMCV2T: NORMAL
HGB BLD-MCNC: 13.7 G/DL (ref 12.1–17)
HGB UR QL STRIP: NEGATIVE
IMM GRANULOCYTES # BLD AUTO: 0.1 K/UL (ref 0–0.04)
IMM GRANULOCYTES NFR BLD AUTO: 1 % (ref 0–0.5)
KETONES UR QL STRIP.AUTO: NEGATIVE MG/DL
LEUKOCYTE ESTERASE UR QL STRIP.AUTO: NEGATIVE
LYMPHOCYTES # BLD: 4.8 K/UL (ref 0.8–3.5)
LYMPHOCYTES NFR BLD: 41 % (ref 12–49)
MCH RBC QN AUTO: 30.3 PG (ref 26–34)
MCHC RBC AUTO-ENTMCNC: 33.9 G/DL (ref 30–36.5)
MCV RBC AUTO: 89.4 FL (ref 80–99)
METHADONE UR QL: NEGATIVE
MONOCYTES # BLD: 0.6 K/UL (ref 0–1)
MONOCYTES NFR BLD: 5 % (ref 5–13)
NEUTS SEG # BLD: 6 K/UL (ref 1.8–8)
NEUTS SEG NFR BLD: 51 % (ref 32–75)
NITRITE UR QL STRIP.AUTO: NEGATIVE
NRBC # BLD: 0 K/UL (ref 0–0.01)
NRBC BLD-RTO: 0 PER 100 WBC
OPIATES UR QL: NEGATIVE
PCP UR QL: NEGATIVE
PH UR STRIP: 5 [PH] (ref 5–8)
PLATELET # BLD AUTO: 263 K/UL (ref 150–400)
PMV BLD AUTO: 9.8 FL (ref 8.9–12.9)
POTASSIUM SERPL-SCNC: 2.8 MMOL/L (ref 3.5–5.1)
PROT SERPL-MCNC: 7.4 G/DL (ref 6.4–8.2)
PROT UR STRIP-MCNC: NEGATIVE MG/DL
RBC # BLD AUTO: 4.52 M/UL (ref 4.1–5.7)
RBC #/AREA URNS HPF: NORMAL /HPF (ref 0–5)
SERVICE CMNT-IMP: ABNORMAL
SODIUM SERPL-SCNC: 135 MMOL/L (ref 136–145)
SOURCE, COVRS: NORMAL
SP GR UR REFRACTOMETRY: 1.01 (ref 1–1.03)
SPECIMEN SOURCE, FCOV2M: NORMAL
SPECIMEN TYPE, XMCV1T: NORMAL
SPECIMEN TYPE, XMCV1T: NORMAL
TSH SERPL DL<=0.05 MIU/L-ACNC: 0.63 UIU/ML (ref 0.36–3.74)
UR CULT HOLD, URHOLD: NORMAL
UROBILINOGEN UR QL STRIP.AUTO: 0.2 EU/DL (ref 0.2–1)
WBC # BLD AUTO: 11.6 K/UL (ref 4.1–11.1)
WBC URNS QL MICRO: NORMAL /HPF (ref 0–4)

## 2020-09-25 PROCEDURE — 96375 TX/PRO/DX INJ NEW DRUG ADDON: CPT

## 2020-09-25 PROCEDURE — 87635 SARS-COV-2 COVID-19 AMP PRB: CPT

## 2020-09-25 PROCEDURE — 85025 COMPLETE CBC W/AUTO DIFF WBC: CPT

## 2020-09-25 PROCEDURE — 74011250637 HC RX REV CODE- 250/637: Performed by: PSYCHIATRY & NEUROLOGY

## 2020-09-25 PROCEDURE — 96374 THER/PROPH/DIAG INJ IV PUSH: CPT

## 2020-09-25 PROCEDURE — 81001 URINALYSIS AUTO W/SCOPE: CPT

## 2020-09-25 PROCEDURE — 90791 PSYCH DIAGNOSTIC EVALUATION: CPT

## 2020-09-25 PROCEDURE — 93005 ELECTROCARDIOGRAM TRACING: CPT

## 2020-09-25 PROCEDURE — 80307 DRUG TEST PRSMV CHEM ANLYZR: CPT

## 2020-09-25 PROCEDURE — 36415 COLL VENOUS BLD VENIPUNCTURE: CPT

## 2020-09-25 PROCEDURE — 99285 EMERGENCY DEPT VISIT HI MDM: CPT

## 2020-09-25 PROCEDURE — 84443 ASSAY THYROID STIM HORMONE: CPT

## 2020-09-25 PROCEDURE — 80053 COMPREHEN METABOLIC PANEL: CPT

## 2020-09-25 PROCEDURE — 65220000001 HC RM PRIVATE PSYCH

## 2020-09-25 PROCEDURE — 74011250636 HC RX REV CODE- 250/636: Performed by: EMERGENCY MEDICINE

## 2020-09-25 PROCEDURE — 82962 GLUCOSE BLOOD TEST: CPT

## 2020-09-25 PROCEDURE — 74011250637 HC RX REV CODE- 250/637: Performed by: NURSE PRACTITIONER

## 2020-09-25 RX ORDER — DIPHENHYDRAMINE HYDROCHLORIDE 50 MG/ML
50 INJECTION, SOLUTION INTRAMUSCULAR; INTRAVENOUS
Status: DISCONTINUED | OUTPATIENT
Start: 2020-09-25 | End: 2020-09-29 | Stop reason: HOSPADM

## 2020-09-25 RX ORDER — POTASSIUM CHLORIDE 750 MG/1
40 TABLET, FILM COATED, EXTENDED RELEASE ORAL
Status: DISPENSED | OUTPATIENT
Start: 2020-09-25 | End: 2020-09-25

## 2020-09-25 RX ORDER — LORAZEPAM 2 MG/ML
INJECTION INTRAMUSCULAR
Status: DISPENSED
Start: 2020-09-25 | End: 2020-09-25

## 2020-09-25 RX ORDER — PHENOBARBITAL 32.4 MG/1
16.2 TABLET ORAL
Status: ACTIVE | OUTPATIENT
Start: 2020-09-28 | End: 2020-09-29

## 2020-09-25 RX ORDER — SODIUM CHLORIDE 0.9 % (FLUSH) 0.9 %
5-40 SYRINGE (ML) INJECTION AS NEEDED
Status: DISCONTINUED | OUTPATIENT
Start: 2020-09-25 | End: 2020-09-29 | Stop reason: HOSPADM

## 2020-09-25 RX ORDER — PHENOBARBITAL 32.4 MG/1
32.4 TABLET ORAL
Status: ACTIVE | OUTPATIENT
Start: 2020-09-26 | End: 2020-09-28

## 2020-09-25 RX ORDER — SODIUM CHLORIDE 0.9 % (FLUSH) 0.9 %
5-40 SYRINGE (ML) INJECTION EVERY 8 HOURS
Status: DISCONTINUED | OUTPATIENT
Start: 2020-09-25 | End: 2020-09-26

## 2020-09-25 RX ORDER — PHENOBARBITAL 32.4 MG/1
16.2 TABLET ORAL 2 TIMES DAILY
Status: DISCONTINUED | OUTPATIENT
Start: 2020-09-28 | End: 2020-09-27

## 2020-09-25 RX ORDER — LOSARTAN POTASSIUM 50 MG/1
100 TABLET ORAL DAILY
Status: DISCONTINUED | OUTPATIENT
Start: 2020-09-25 | End: 2020-09-29 | Stop reason: HOSPADM

## 2020-09-25 RX ORDER — HALOPERIDOL 5 MG/ML
5 INJECTION INTRAMUSCULAR
Status: COMPLETED | OUTPATIENT
Start: 2020-09-25 | End: 2020-09-25

## 2020-09-25 RX ORDER — BENZTROPINE MESYLATE 1 MG/1
1 TABLET ORAL
Status: DISCONTINUED | OUTPATIENT
Start: 2020-09-25 | End: 2020-09-29 | Stop reason: HOSPADM

## 2020-09-25 RX ORDER — ACETAMINOPHEN 325 MG/1
650 TABLET ORAL
Status: DISCONTINUED | OUTPATIENT
Start: 2020-09-25 | End: 2020-09-29 | Stop reason: HOSPADM

## 2020-09-25 RX ORDER — PHENOBARBITAL 64.8 MG/1
64.8 TABLET ORAL 4 TIMES DAILY
Status: DISCONTINUED | OUTPATIENT
Start: 2020-09-25 | End: 2020-09-26

## 2020-09-25 RX ORDER — OLANZAPINE 5 MG/1
5 TABLET ORAL
Status: DISCONTINUED | OUTPATIENT
Start: 2020-09-25 | End: 2020-09-29 | Stop reason: HOSPADM

## 2020-09-25 RX ORDER — ATORVASTATIN CALCIUM 10 MG/1
10 TABLET, FILM COATED ORAL DAILY
Status: DISCONTINUED | OUTPATIENT
Start: 2020-09-25 | End: 2020-09-29 | Stop reason: HOSPADM

## 2020-09-25 RX ORDER — HALOPERIDOL 5 MG/ML
INJECTION INTRAMUSCULAR
Status: DISPENSED
Start: 2020-09-25 | End: 2020-09-25

## 2020-09-25 RX ORDER — PHENOBARBITAL 32.4 MG/1
32.4 TABLET ORAL 2 TIMES DAILY
Status: DISCONTINUED | OUTPATIENT
Start: 2020-09-27 | End: 2020-09-27

## 2020-09-25 RX ORDER — HYDROXYZINE 50 MG/1
50 TABLET, FILM COATED ORAL
Status: DISCONTINUED | OUTPATIENT
Start: 2020-09-25 | End: 2020-09-29 | Stop reason: HOSPADM

## 2020-09-25 RX ORDER — LORAZEPAM 2 MG/ML
1 INJECTION INTRAMUSCULAR
Status: DISCONTINUED | OUTPATIENT
Start: 2020-09-25 | End: 2020-09-29 | Stop reason: HOSPADM

## 2020-09-25 RX ORDER — PHENOBARBITAL 32.4 MG/1
32.4 TABLET ORAL 4 TIMES DAILY
Status: DISCONTINUED | OUTPATIENT
Start: 2020-09-26 | End: 2020-09-27

## 2020-09-25 RX ORDER — LORAZEPAM 2 MG/ML
1 INJECTION INTRAMUSCULAR ONCE
Status: COMPLETED | OUTPATIENT
Start: 2020-09-25 | End: 2020-09-25

## 2020-09-25 RX ORDER — SERTRALINE HYDROCHLORIDE 50 MG/1
150 TABLET, FILM COATED ORAL DAILY
Status: DISCONTINUED | OUTPATIENT
Start: 2020-09-25 | End: 2020-09-25

## 2020-09-25 RX ORDER — ADHESIVE BANDAGE
30 BANDAGE TOPICAL DAILY PRN
Status: DISCONTINUED | OUTPATIENT
Start: 2020-09-25 | End: 2020-09-29 | Stop reason: HOSPADM

## 2020-09-25 RX ORDER — IBUPROFEN 200 MG
1 TABLET ORAL DAILY
Status: DISCONTINUED | OUTPATIENT
Start: 2020-09-25 | End: 2020-09-29 | Stop reason: HOSPADM

## 2020-09-25 RX ORDER — SERTRALINE HYDROCHLORIDE 50 MG/1
50 TABLET, FILM COATED ORAL DAILY
Status: DISCONTINUED | OUTPATIENT
Start: 2020-09-25 | End: 2020-09-29 | Stop reason: HOSPADM

## 2020-09-25 RX ORDER — METFORMIN HYDROCHLORIDE 500 MG/1
1000 TABLET ORAL 2 TIMES DAILY WITH MEALS
Status: DISCONTINUED | OUTPATIENT
Start: 2020-09-25 | End: 2020-09-29 | Stop reason: HOSPADM

## 2020-09-25 RX ORDER — HALOPERIDOL 5 MG/ML
5 INJECTION INTRAMUSCULAR
Status: DISCONTINUED | OUTPATIENT
Start: 2020-09-25 | End: 2020-09-29 | Stop reason: HOSPADM

## 2020-09-25 RX ORDER — TRAZODONE HYDROCHLORIDE 50 MG/1
50 TABLET ORAL
Status: DISCONTINUED | OUTPATIENT
Start: 2020-09-25 | End: 2020-09-29 | Stop reason: HOSPADM

## 2020-09-25 RX ORDER — PHENOBARBITAL 64.8 MG/1
64.8 TABLET ORAL
Status: ACTIVE | OUTPATIENT
Start: 2020-09-25 | End: 2020-09-26

## 2020-09-25 RX ADMIN — PHENOBARBITAL 64.8 MG: 64.8 TABLET ORAL at 17:00

## 2020-09-25 RX ADMIN — ATORVASTATIN CALCIUM 10 MG: 10 TABLET, FILM COATED ORAL at 16:57

## 2020-09-25 RX ADMIN — PHENOBARBITAL 64.8 MG: 64.8 TABLET ORAL at 21:32

## 2020-09-25 RX ADMIN — SERTRALINE HYDROCHLORIDE 50 MG: 50 TABLET ORAL at 16:59

## 2020-09-25 RX ADMIN — LOSARTAN POTASSIUM 100 MG: 50 TABLET, FILM COATED ORAL at 16:58

## 2020-09-25 RX ADMIN — Medication 10 ML: at 02:22

## 2020-09-25 RX ADMIN — LORAZEPAM 1 MG: 2 INJECTION INTRAMUSCULAR; INTRAVENOUS at 02:22

## 2020-09-25 RX ADMIN — METFORMIN HYDROCHLORIDE 1000 MG: 500 TABLET ORAL at 18:05

## 2020-09-25 RX ADMIN — LURASIDONE HYDROCHLORIDE 160 MG: 40 TABLET, FILM COATED ORAL at 18:05

## 2020-09-25 RX ADMIN — HALOPERIDOL LACTATE 5 MG: 5 INJECTION, SOLUTION INTRAMUSCULAR at 02:39

## 2020-09-25 NOTE — PROGRESS NOTES
NUTRITION     Nutrition screening referral was triggered based on results obtained during nursing admission assessment for Unsure wt loss    The patient's chart was reviewed and nutrition assessment is not indicated at this time. No significant wt loss noted. Wt Readings from Last 10 Encounters:   08/09/20 89.5 kg (197 lb 4.8 oz)   08/05/20 92.6 kg (204 lb 2.3 oz)   06/29/20 88.9 kg (196 lb)   06/26/20 91.3 kg (201 lb 4 oz)   06/14/20 86.4 kg (190 lb 8 oz)   06/10/20 88.6 kg (195 lb 5 oz)   05/30/20 88.6 kg (195 lb 4 oz)   05/21/20 86.2 kg (190 lb)   04/24/20 95.3 kg (210 lb)   01/04/20 95.3 kg (210 lb)     Will adjust diet to account for estimated needs. Please consult as needed. Thank you.      May Queen RD
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
## 2020-09-25 NOTE — BH NOTES
TRANSFER - IN REPORT:    Verbal report received from FRANCISCO J RN on Bhargavi Maki  being received from [facility] er for routine progression of care      Report consisted of patients Situation, Background, Assessment and   Recommendations(SBAR). Information from the following report(s) SBAR was reviewed with the receiving nurse. Opportunity for questions and clarification was provided. Assessment completed upon patients arrival to unit and care assumed.

## 2020-09-25 NOTE — ED PROVIDER NOTES
History of alcohol abuse, cocaine abuse, chronic pain, diabetes, hypertension, hepatitis C, depression and schizoaffective disorder. He presents with complaints of a month long history of suicidal and homicidal ideations. He states that he is hearing voices that are telling him to harm himself and others. He states that he is homeless. He drinks alcohol. He denies illegal drug use currently. He states that he was in the [facility] for 10 days recently.            Past Medical History:   Diagnosis Date    Alcohol abuse     Chronic pain     Cocaine abuse ([facility] [address]     Diabetes ([facility] [address]     Hepatitis C     Hypertension     Psychiatric disorder     Depression & Schizoaffective Disorder       Past Surgical History:   Procedure Laterality Date    HX ORTHOPAEDIC      right foot gangrene         Family History:   Problem Relation Age of Onset    Lung Cancer Mother        Social History     Socioeconomic History    Marital status: SINGLE     Spouse name: Not on file    Number of children: Not on file    Years of education: Not on file    Highest education level: Not on file   Occupational History    Not on file   Social Needs    Financial resource strain: Not on file    Food insecurity     Worry: Not on file     Inability: Not on file    Transportation needs     Medical: Not on file     Non-medical: Not on file   Tobacco Use    Smoking status: Current Every Day Smoker     Packs/day: 1.00     Types: Cigarettes     Last attempt to quit: 2016     Years since quittin.0    Smokeless tobacco: Never Used   Substance and Sexual Activity    Alcohol use: Not Currently     Alcohol/week: 0.0 standard drinks     Comment: h/o of abuse states sober x1 year    Drug use: Yes     Types: Cocaine     Comment: Past IV drug user    Sexual activity: Not Currently   Lifestyle    Physical activity     Days per week: Not on file     Minutes per session: Not on file    Stress: Not on file   Relationships    Social connections     Talks on phone: Not on file     Gets together: Not on file     Attends [employer] service: Not on file     Active member of club or organization: Not on file     Attends meetings of clubs or organizations: Not on file     Relationship status: Not on file    Intimate partner violence     Fear of current or ex partner: Not on file     Emotionally abused: Not on file     Physically abused: Not on file     Forced sexual activity: Not on file   Other Topics Concern     Service Not Asked    Blood Transfusions Not Asked    Caffeine Concern Not Asked    Occupational Exposure Not Asked   Chares Smiling Hazards Not Asked    Sleep Concern Not Asked    Stress Concern Not Asked    Weight Concern Not Asked    Special Diet Not Asked    Back Care Not Asked    Exercise Not Asked    Bike Helmet Not Asked   [address] Not Asked    Self-Exams Not Asked   Social History Narrative    Not on file         ALLERGIES: Tramadol and Lisinopril    Review of Systems   All other systems reviewed and are negative. Vitals:    09/25/20 0022   BP: 124/76   Pulse: 98   Resp: 16   Temp: 98.4 °F (36.9 °C)   SpO2: 98%            Physical Exam  Vitals signs and nursing note reviewed. Constitutional:       Appearance: He is well-developed. HENT:      Head: Normocephalic and atraumatic. Eyes:      Conjunctiva/sclera: Conjunctivae normal.   Neck:      Musculoskeletal: Neck supple. Trachea: No tracheal deviation. Cardiovascular:      Rate and Rhythm: Normal rate and regular rhythm. Heart sounds: Normal heart sounds. No murmur. No friction rub. No gallop. Pulmonary:      Effort: Pulmonary effort is normal.      Breath sounds: Normal breath sounds. Abdominal:      Palpations: Abdomen is soft. Tenderness: There is no abdominal tenderness. Musculoskeletal:         General: No deformity. Skin:     General: Skin is warm and dry. Neurological:      Mental Status: He is alert.       Comments: oriented   Psychiatric:      Comments: Appears depressed. MDM       Procedures    Soon after arrival, patient attempted to hang himself in the room with the otoscope cord. David Farfan MD    Assessment/plan: Suicidal and homicidal ideations -admit to psych. At change of shift patient is awaiting COVID testing for medical clearance.   David Farfan MD  6:40 AM
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
## 2020-09-25 NOTE — PROGRESS NOTES
Patient complied with medication plan and contracted for safety in the hospital. Remained isolated in room but responded appropriately to staff and consumed 100% of dinner. Problem: Discharge Planning  Goal: *Knowledge of medication management  Outcome: Progressing Towards Goal     Problem: Altered Thought Process (Adult/Pediatric)  Goal: *STG: Participates in treatment plan  Description: Met with treatment team with active participation  Outcome: Progressing Towards Goal  Goal: *STG: Remains safe in hospital  Description: Pt denies any suicidal or homicidal thoughts. Contracts for safety. Remains on q 15 min safety checks. Outcome: Progressing Towards Goal  Goal: *STG: Complies with medication therapy  Description: Stated I want to get started back on my medication. Outcome: Progressing Towards Goal     Problem: Falls - Risk of  Goal: *Absence of Falls  Description: Document Albino Messing Fall Risk and appropriate interventions in the flowsheet.   9/25/2020 1938 by Janell Harden  Outcome: Progressing Towards Goal  Note: Fall Risk Interventions:   Medication Interventions: Teach patient to arise slowly  9/25/2020 1936 by Janell Harden  Note: Fall Risk Interventions:   Medication Interventions: Teach patient to arise slowly

## 2020-09-25 NOTE — BH NOTES
1044- pt is in dayroom with staff for admission. Arrives from ED [facility].

## 2020-09-25 NOTE — ROUTINE PROCESS
TRANSFER - OUT REPORT: 
 
Verbal report given to [facility] RN(name) on Gladys Bellamy  being transferred to psych ICU(unit) for routine progression of care Report consisted of patients Situation, Background, Assessment and  
Recommendations(SBAR). Information from the following report(s) SBAR, Kardex, ED Summary, [facility] and Recent Results was reviewed with the receiving nurse. Lines:  
Peripheral IV 09/25/20 Left Arm (Active) Site Assessment Clean, dry, & intact 09/25/20 0046 Phlebitis Assessment 0 09/25/20 0046 Infiltration Assessment 0 09/25/20 0046 Dressing Status Clean, dry, & intact 09/25/20 0046 Opportunity for questions and clarification was provided. Patient transported with: 
 Registered Nurse and HPD

## 2020-09-25 NOTE — ED NOTES
Gave bedside report regarding, SBAR, MAR, and plan of care to NICHOLAS Jorgensen. Transfered care of patient to RN.

## 2020-09-25 NOTE — PROGRESS NOTES
Admission Medication Reconciliation:    Information obtained from:  Patient interview,   RxQuery data available¹:  YES (outdated)    Comments/Recommendations: Updated PTA meds/reviewed patient's allergies. 1)  Patient states that he was hospitalized at the [facility] following discharging from [facility] on 8/11/20. He reports that he is no longer taking many of his medications (see removed below). He was on amlodipine in 8/2020. Recommend close monitoring of BP, may need to add additional antihypertensive. 2)  Medication changes (since last review):  Removed  - amlodipine, aspirin, famotidine, furosemide, gabapentin (last filled 7/2020), sertraline, medical supplies     ¹RxQuery pharmacy benefit data reflects medications filled and processed through the patient's insurance, however   this data does NOT capture whether the medication was picked up or is currently being taken by the patient. Allergies:  Tramadol and Lisinopril    Significant PMH/Disease States:   Past Medical History:   Diagnosis Date    Alcohol abuse     Chronic pain     Cocaine abuse ([facility]     Diabetes ([facility]     Hepatitis C     Hypertension     Psychiatric disorder     Depression & Schizoaffective Disorder     Chief Complaint for this Admission:    Chief Complaint   Patient presents with    Mental Health Problem     Prior to Admission Medications:   Prior to Admission Medications   Prescriptions Last Dose Informant Taking?   atorvastatin (LIPITOR) 10 mg tablet  Self No   Sig: Take 1 Tab by mouth daily. Indications: prevent stroke   diclofenac (VOLTAREN) 1 % gel   No   Sig: AAA in thin coat BID as needed for painful joints  Indications: joint damage causing pain and loss of function   lurasidone (LATUDA) 120 mg tab tablet   No   Sig: Take 1 Tab by mouth daily (with dinner).  Take 1 tablet with dinner take it with 350 calories of meals  Indications: bipolar depression   metFORMIN (GLUCOPHAGE) 1,000 mg tablet   No   Sig: Take 1 Tab by mouth two (2) times daily (with meals). Indications: type 2 diabetes mellitus   valsartan (DIOVAN) 160 mg tablet   No   Sig: Take 1 Tab by mouth daily.  Indications: high blood pressure      Facility-Administered Medications: None     LUNA NoD
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
## 2020-09-25 NOTE — PROGRESS NOTES
Laboratory Monitoring for Antipsychotics: This patient is currently prescribed the following medication(s):   Current Facility-Administered Medications   Medication Dose Route Frequency    sodium chloride (NS) flush 5-40 mL  5-40 mL IntraVENous Q8H    potassium chloride SR (KLOR-CON 10) tablet 40 mEq  40 mEq Oral NOW    metFORMIN (GLUCOPHAGE) tablet 1,000 mg  1,000 mg Oral BID WITH MEALS    atorvastatin (LIPITOR) tablet 10 mg  10 mg Oral DAILY    sertraline (ZOLOFT) tablet 50 mg  50 mg Oral DAILY    lurasidone (LATUDA) tablet 160 mg  160 mg Oral DAILY WITH DINNER     The following labs have been completed for monitoring of antipsychotics and/or mood stabilizers:    Height, Weight, BMI Estimation  Estimated body mass index is 26.76 kg/m² as calculated from the following:    Height as of 8/8/20: 182.9 cm (72\"). Weight as of 8/9/20: 89.5 kg (197 lb 4.8 oz). Vital Signs/Blood Pressure  Visit Vitals  BP (!) 128/94   Pulse 85   Temp 97.6 °F (36.4 °C)   Resp 16   SpO2 96%     Renal Function, Hepatic Function and Chemistry  CrCl cannot be calculated (Unknown ideal weight.). Lab Results   Component Value Date/Time    Sodium 135 (L) 09/25/2020 12:27 AM    Potassium 2.8 (L) 09/25/2020 12:27 AM    Chloride 99 09/25/2020 12:27 AM    CO2 25 09/25/2020 12:27 AM    Anion gap 11 09/25/2020 12:27 AM    BUN 23 (H) 09/25/2020 12:27 AM    Creatinine 1.54 (H) 09/25/2020 12:27 AM    BUN/Creatinine ratio 15 09/25/2020 12:27 AM    Bilirubin, total 0.2 09/25/2020 12:27 AM    Protein, total 7.4 09/25/2020 12:27 AM    Albumin 3.8 09/25/2020 12:27 AM    Globulin 3.6 09/25/2020 12:27 AM    A-G Ratio 1.1 09/25/2020 12:27 AM    ALT (SGPT) 37 09/25/2020 12:27 AM    AST (SGOT) 25 09/25/2020 12:27 AM    Alk.  phosphatase 78 09/25/2020 12:27 AM     Lab Results   Component Value Date/Time    Glucose 130 (H) 09/25/2020 12:27 AM    Glucose (POC) 79 08/11/2020 11:12 AM     Lab Results   Component Value Date/Time    Hemoglobin A1c 6.7 (H) 06/12/2020 03:53 PM     Hematology  Lab Results   Component Value Date/Time    WBC 11.6 (H) 09/25/2020 12:27 AM    RBC 4.52 09/25/2020 12:27 AM    HGB 13.7 09/25/2020 12:27 AM    HCT 40.4 09/25/2020 12:27 AM    MCV 89.4 09/25/2020 12:27 AM    MCH 30.3 09/25/2020 12:27 AM    MCHC 33.9 09/25/2020 12:27 AM    RDW 12.8 09/25/2020 12:27 AM    PLATELET 765 16/25/2075 12:27 AM     Lipids  Lab Results   Component Value Date/Time    Cholesterol, total 169 06/16/2020 04:01 AM    HDL Cholesterol 42 06/16/2020 04:01 AM    LDL, calculated 96.2 06/16/2020 04:01 AM    Triglyceride 154 (H) 06/16/2020 04:01 AM    CHOL/HDL Ratio 4.0 06/16/2020 04:01 AM     Thyroid Function  Lab Results   Component Value Date/Time    TSH 0.07 (L) 06/12/2020 03:53 PM     Assessment/Plan:  Recommended baseline laboratory monitoring has been completed based on this patient's current medication regimen. Follow-up metabolic monitoring labs should be completed in 3 months (quarterly for the first year of antipsychotic therapy).      LUNA AnandD

## 2020-09-25 NOTE — ED NOTES
Unable to transfer patient to psych unit until EKG interpreted by their MD per psych staff. Will receive call once this is done.

## 2020-09-25 NOTE — PROGRESS NOTES
[address] [facility] 60  Master Treatment Plan for Neville Sneed    Date Treatment Plan Initiated: 9/25/2020    Treatment Plan Modalities:  Type of Modality Amount  (x minutes) Frequency (x/week) Duration (x days) Name of Responsible Staff   Community & wrap-up meetings to encourage peer interactions 15 7 1   JOSE CRUZ LEON   Group psychotherapy to assist in building coping skills and internal controls 60 7 1 HEDAVID   Therapeutic activity groups to build coping skills 60 7 1 HEBA   Psychoeducation in group setting to address:   Medication education   15 7 1 EARL PETERSON   Coping skills      Lilian PETERSON    Relaxation techniques         Symptom management         Discharge planning   61 2 255 [facility]    60 2 1 Chaplain LOBATO   61 1 1 volunteer   Recovery/AA/NA      volunteer   Physician medication management   15 7 1    Family meeting/discharge planning   15 2 1 Malou Ornelas and Kinga Moody                                    Problem: Altered Thought Process (Adult/Pediatric)  Goal: *STG: Participates in treatment plan  Description: Met with treatment team with active participation  Outcome: Progressing Towards Goal  Note: Active participation in treatment team.     Problem: Altered Thought Process (Adult/Pediatric)  Goal: *STG: Remains safe in hospital  Description: Pt denies any suicidal or homicidal thoughts. Contracts for safety. Remains on q 15 min safety checks. Note: Pt denies any suicidal or homicidal thoughts. Contracts for safety. Remains on q 15 min safety checks. Problem: Altered Thought Process (Adult/Pediatric)  Goal: *STG: Complies with medication therapy  Description: Stated I want to get started back on my medication. Outcome: Progressing Towards Goal  Note: Stated \"I want to get back on my medications \"     Problem: Altered Thought Process (Adult/Pediatric)  Goal: *STG: Decreased hallucinations  Description: Admits to command AH at intervals.   Outcome: Not Progressing Towards Goal  Note: Admits to [address]

## 2020-09-25 NOTE — ED TRIAGE NOTES
Arrives from an APT, reporting SI, HI. Patients reports cutting his throat with a knife, and has the feeling to hurt anyone. Patient unable to contract to safety. Patient oriented to self and situation.

## 2020-09-25 NOTE — PROGRESS NOTES
Problem: Altered Thought Process (Adult/Pediatric)  Goal: *STG: Participates in treatment plan  Description: Met with treatment team with active participation  Outcome: Progressing Towards Goal  Variance Patient slowly responding  Pt is resting quietly in his room. He refrains from acting out behaviors at this time.

## 2020-09-26 LAB
CHOLEST SERPL-MCNC: 158 MG/DL
GLUCOSE BLD STRIP.AUTO-MCNC: 121 MG/DL (ref 65–100)
GLUCOSE BLD STRIP.AUTO-MCNC: 125 MG/DL (ref 65–100)
GLUCOSE P FAST SERPL-MCNC: 102 MG/DL (ref 65–100)
HDLC SERPL-MCNC: 45 MG/DL
HDLC SERPL: 3.5 {RATIO} (ref 0–5)
LDLC SERPL CALC-MCNC: 73 MG/DL (ref 0–100)
LIPID PROFILE,FLP: ABNORMAL
SERVICE CMNT-IMP: ABNORMAL
SERVICE CMNT-IMP: ABNORMAL
TRIGL SERPL-MCNC: 200 MG/DL (ref ?–150)
VLDLC SERPL CALC-MCNC: 40 MG/DL

## 2020-09-26 PROCEDURE — 80061 LIPID PANEL: CPT

## 2020-09-26 PROCEDURE — 74011250637 HC RX REV CODE- 250/637: Performed by: NURSE PRACTITIONER

## 2020-09-26 PROCEDURE — 65220000001 HC RM PRIVATE PSYCH

## 2020-09-26 PROCEDURE — 82962 GLUCOSE BLOOD TEST: CPT

## 2020-09-26 PROCEDURE — 82947 ASSAY GLUCOSE BLOOD QUANT: CPT

## 2020-09-26 PROCEDURE — 36415 COLL VENOUS BLD VENIPUNCTURE: CPT

## 2020-09-26 PROCEDURE — 74011250637 HC RX REV CODE- 250/637: Performed by: PSYCHIATRY & NEUROLOGY

## 2020-09-26 RX ORDER — GABAPENTIN 300 MG/1
300 CAPSULE ORAL 3 TIMES DAILY
Status: DISCONTINUED | OUTPATIENT
Start: 2020-09-26 | End: 2020-09-29 | Stop reason: HOSPADM

## 2020-09-26 RX ADMIN — LOSARTAN POTASSIUM 100 MG: 50 TABLET, FILM COATED ORAL at 08:48

## 2020-09-26 RX ADMIN — SERTRALINE HYDROCHLORIDE 50 MG: 50 TABLET ORAL at 08:48

## 2020-09-26 RX ADMIN — ACETAMINOPHEN 650 MG: 325 TABLET ORAL at 13:40

## 2020-09-26 RX ADMIN — METFORMIN HYDROCHLORIDE 1000 MG: 500 TABLET ORAL at 08:48

## 2020-09-26 RX ADMIN — ATORVASTATIN CALCIUM 10 MG: 10 TABLET, FILM COATED ORAL at 08:48

## 2020-09-26 RX ADMIN — GABAPENTIN 300 MG: 300 CAPSULE ORAL at 16:16

## 2020-09-26 RX ADMIN — LURASIDONE HYDROCHLORIDE 160 MG: 40 TABLET, FILM COATED ORAL at 16:42

## 2020-09-26 RX ADMIN — METFORMIN HYDROCHLORIDE 1000 MG: 500 TABLET ORAL at 16:42

## 2020-09-26 NOTE — PROGRESS NOTES
Problem: Altered Thought Process (Adult/Pediatric)  Goal: *STG: Remains safe in hospital  Description: Pt denies any suicidal or homicidal thoughts. Contracts for safety. Remains on q 15 min safety checks. Outcome: Progressing Towards Goal  Goal: *STG: Complies with medication therapy  Description: Stated I want to get started back on my medication.   Outcome: Progressing Towards Goal  Goal: *STG: Absence of lethality  Outcome: Progressing Towards Goal  Goal: Interventions  Outcome: Progressing Towards Goal

## 2020-09-26 NOTE — BH NOTES
1340- PRN Medication Documentation    Specific patient behavior that led to need for PRN medication: pt requests Tylenol for BLE pain 4/10   Staff interventions attempted prior to PRN being given: education therapeutic communication   PRN medication given: 650 mg Tylenol po  Patient response/effectiveness of PRN medication: pt is alert and oriented resting in bed.  NAD

## 2020-09-26 NOTE — INTERDISCIPLINARY ROUNDS
Behavioral Health Interdisciplinary Rounds Patient Name: Romie Anguiano  Age: 62 y.o. Room/Bed:  724/ Primary Diagnosis: <principal problem not specified> Admission Status: Voluntary Readmission within 30 days: no 
Power of  in place: no 
Patient requires a blocked bed: no          Reason for blocked bed: VTE Prophylaxis: No 
 
Mobility needs/Fall risk: no 
Flu Vaccine : no  
Nutritional Plan: no 
Consults:         
Labs/Testing due today?: yes Sleep hours:  8 Participation in Care/Groups:  no 
Medication Compliant?: Yes PRNS (last 24 hours): None Restraints (last 24 hours):  no 
  
CIWA (range last 24 hours): CIWA-Ar Total: 0  
COWS (range last 24 hours): Alcohol screening (AUDIT) completed - If applicable, date SBIRT discussed in treatment team AND documented:  
AUDIT Screen Score: Document Brief Intervention (corresponds directly with the 5 A's, Ask, Advise, Assess, Assist, and Arrange): At- Risk Patients (Score 7-15 for women; 8-15 for men) Discuss concern patient is drinking at unhealthy levels known to increase risk of alcohol-related health problems. Is Patient ready to commit to change? If No: 
? Encourage reflection ? Discuss short term and long term health risks of consuming alcohol ? Barriers to change ? Reaffirm willingness to help / Educational materials provided If Yes: 
? Set goal 
? Plan 
? Educational materials provided Harmful use or Dependence (Score 16 or greater) ? Discuss short term and long term health risks of consuming alcohol ? Recommendations ? Negotiate drinking goal 
? Recommend addiction specialist/center ? Arrange follow-up appointments. Tobacco - patient is a smoker: Have You Used Tobacco in the Past 30 Days: No 
Illegal Drugs use: Have You Used Any Illegal Substances Over the Past 12 Months: Yes 
 
24 hour chart check complete: no  
 
Patient goal(s) for today: Comply with detox protocol Treatment team focus/goals: Eval safe detox Progress note  Denied having any issues with detox - refused to take Phenobarbital -  
 
LOS:  1  Expected LOS: tentative D/C Scheduled for 9/28/2020 Financial concerns/prescription coverage:  No 
Family contact:  TC / friend / coordinated housing  Contact w [employer] for Housing Family requesting physician contact today:   
Discharge plan: Pt plans to stay with a friend ( not homeless Access to weapons :       
Outpatient provider(s):  F/U @ [facility] for Out pt Psych Services Patient's preferred phone number for follow up call :  
 
Participating treatment team members: Nathalia Hernandez NP, TANGELA Vee RN and Alfred HORTAW

## 2020-09-26 NOTE — PROGRESS NOTES
Problem: Falls - Risk of  Goal: *Absence of Falls  Description: Document Pauly Lomelion Fall Risk and appropriate interventions in the flowsheet. Outcome: Progressing Towards Goal  Note: Fall Risk Interventions:            Medication Interventions: Teach patient to arise slowly       Pt. Received resting in bed,Nad, respirations even and unlabored. Will continiue q15 safety checks.

## 2020-09-26 NOTE — PROGRESS NOTES
PSYCHIATRIC PROGRESS NOTE       Patient: Efrain Carlos MRN: [id number]  SSN: [id number]    YOB: 1960  Age: 62 y.o. Sex: male      Admit Date: 9/25/2020    LOS: 1 day       Chief Complaint:  I am not detoxing. Interval History:  Efrain Carlos states he is doing ok. Denies si hi or avh. Denies physical withdrawal symptoms at this point, vss. He's been refusing phebobarb and prefers not to take at all. He is requesting for neurontin to be restarted. Requesting assistance with housing. Past Medical History:  Past Medical History:   Diagnosis Date    Alcohol abuse     Chronic pain     Cocaine abuse ([address]     Diabetes ([address]     Hepatitis C     Hypertension     Psychiatric disorder     Depression & Schizoaffective Disorder         ALLERGIES:(reviewed/updated 9/26/2020)  Allergies   Allergen Reactions    Tramadol Nausea and Vomiting    Lisinopril Cough     Developed cough while taking lisinopril       Laboratory report:  Lab Results   Component Value Date/Time    WBC 11.6 (H) 09/25/2020 12:27 AM    HGB 13.7 09/25/2020 12:27 AM    HCT 40.4 09/25/2020 12:27 AM    PLATELET 424 50/28/3197 12:27 AM    MCV 89.4 09/25/2020 12:27 AM      Lab Results   Component Value Date/Time    Sodium 135 (L) 09/25/2020 12:27 AM    Potassium 2.8 (L) 09/25/2020 12:27 AM    Chloride 99 09/25/2020 12:27 AM    CO2 25 09/25/2020 12:27 AM    Anion gap 11 09/25/2020 12:27 AM    Glucose 102 (H) 09/26/2020 06:18 AM    BUN 23 (H) 09/25/2020 12:27 AM    Creatinine 1.54 (H) 09/25/2020 12:27 AM    BUN/Creatinine ratio 15 09/25/2020 12:27 AM    GFR est AA 56 (L) 09/25/2020 12:27 AM    GFR est non-AA 46 (L) 09/25/2020 12:27 AM    Calcium 8.7 09/25/2020 12:27 AM    Bilirubin, total 0.2 09/25/2020 12:27 AM    Alk.  phosphatase 78 09/25/2020 12:27 AM    Protein, total 7.4 09/25/2020 12:27 AM    Albumin 3.8 09/25/2020 12:27 AM    Globulin 3.6 09/25/2020 12:27 AM    A-G Ratio 1.1 09/25/2020 12:27 AM    ALT (SGPT) 37 09/25/2020 12:27 AM Vitals:    09/25/20 1657 09/25/20 2040 09/26/20 0759 09/26/20 1152   BP: (!) 138/91 116/82 128/76 102/69   Pulse: 90 (!) 55 91 100   Resp:  18 16 16   Temp:  99 °F (37.2 °C) 98.4 °F (36.9 °C) 98.1 °F (36.7 °C)   SpO2:  97% 99% 98%   Weight:       Height:           No results found for: VALF2, VALAC, VALP, VALPR, DS6, CRBAM, CRBAMP, CARB2, XCRBAM  No results found for: LITHM    Vital Signs  Patient Vitals for the past 24 hrs:   Temp Pulse Resp BP SpO2   09/26/20 1152 98.1 °F (36.7 °C) 100 16 102/69 98 %   09/26/20 0759 98.4 °F (36.9 °C) 91 16 128/76 99 %   09/25/20 2040 99 °F (37.2 °C) (!) 55 18 116/82 97 %   09/25/20 1657  90  (!) 138/91    09/25/20 1646 98.4 °F (36.9 °C) 90 18 (!) 138/91 98 %     Wt Readings from Last 3 Encounters:   09/25/20 89.4 kg (197 lb)   08/09/20 89.5 kg (197 lb 4.8 oz)   08/05/20 92.6 kg (204 lb 2.3 oz)     Temp Readings from Last 3 Encounters:   09/26/20 98.1 °F (36.7 °C)   08/11/20 97.6 °F (36.4 °C)   08/08/20 98.1 °F (36.7 °C)     BP Readings from Last 3 Encounters:   09/26/20 102/69   08/11/20 126/77   08/08/20 139/88     Pulse Readings from Last 3 Encounters:   09/26/20 100   08/11/20 87   08/08/20 82       Radiology (reviewed/updated 9/26/2020)  Xr Chest Port    Result Date: 8/4/2020  EXAM: XR CHEST PORT INDICATION: Chest Pain COMPARISON: 7/29/2015 through 12/1/2014. FINDINGS: A portable AP radiograph of the chest was obtained at 17:12 hours. The patient is on a cardiac monitor. The lungs are clear. The cardiac and mediastinal contours and pulmonary vascularity are normal.  The chest wall structures and visualized upper abdomen show no acute findings with incidental note of degenerative spine and shoulder changes. IMPRESSION: No acute findings.       Current Facility-Administered Medications   Medication Dose Route Frequency Provider Last Rate Last Dose    gabapentin (NEURONTIN) capsule 300 mg  300 mg Oral TID Thelma Zhang, NP        sodium chloride (NS) flush 5-40 mL  5-40 mL IntraVENous PRN Hussein Garrison MD        OLANZapine (ZyPREXA) tablet 5 mg  5 mg Oral Q6H PRN Thelma Zhang NP        haloperidol lactate (HALDOL) injection 5 mg  5 mg IntraMUSCular Q6H PRN Thelma Zhang NP        benztropine (COGENTIN) tablet 1 mg  1 mg Oral BID PRN Thelma Zhang NP        diphenhydrAMINE (BENADRYL) injection 50 mg  50 mg IntraMUSCular BID PRN Thelma Zhang NP        hydrOXYzine HCL (ATARAX) tablet 50 mg  50 mg Oral TID PRN Thelma Zhang NP        LORazepam (ATIVAN) injection 1 mg  1 mg IntraMUSCular Q4H PRN Thelma Zhang NP        traZODone (DESYREL) tablet 50 mg  50 mg Oral QHS PRN Thelma Zhang NP        acetaminophen (TYLENOL) tablet 650 mg  650 mg Oral Q4H PRN Thelma Zhang NP        magnesium hydroxide (MILK OF MAGNESIA) 400 mg/5 mL oral suspension 30 mL  30 mL Oral DAILY PRN Thelma Zhang NP        metFORMIN (GLUCOPHAGE) tablet 1,000 mg  1,000 mg Oral BID WITH MEALS Ruchi Penny MD   1,000 mg at 09/26/20 0848    atorvastatin (LIPITOR) tablet 10 mg  10 mg Oral DAILY Ruchi Penny MD   10 mg at 09/26/20 0848    sertraline (ZOLOFT) tablet 50 mg  50 mg Oral DAILY Ruchi Penny MD   50 mg at 09/26/20 0848    lurasidone (LATUDA) tablet 160 mg  160 mg Oral DAILY WITH Diana Mars MD   160 mg at 09/25/20 1805    losartan (COZAAR) tablet 100 mg  100 mg Oral DAILY Ruchi Penny MD   100 mg at 09/26/20 0848    PHENobarbitaL (LUMINAL) tablet 32.4 mg  32.4 mg Oral QID Thelma Zhang NP        Followed by   Hung Morris ON 9/27/2020] PHENobarbitaL (LUMINAL) tablet 32.4 mg  32.4 mg Oral BID Thelma Zhang NP        Followed by   Hung Morris ON 9/28/2020] PHENobarbitaL (LUMINAL) tablet 16.2 mg  16.2 mg Oral BID Thelma Zhang NP        PHENobarbitaL (LUMINAL) tablet 32.4 mg  32.4 mg Oral Q6H PRN Thelma Zhang NP        Followed by   Hung Morris ON 9/28/2020] PHENobarbitaL (LUMINAL) tablet 16.2 mg  16.2 mg Oral Q6H PRN Thelma Zhang NP        nicotine (NICODERM CQ) 21 mg/24 hr patch 1 Patch  1 Patch TransDERmal DAILY Thelma Zhang NP   Stopped at 09/25/20 1300       Side Effects: (reviewed/updated 9/26/2020)  None reported or admitted to. Review of Systems: (reviewed/updated 9/26/2020)  Appetite: good  Sleep: good   All other Review of Systems: negative    Mental Status Exam:  Eye contact: Good eye contact  Psychomotor activity: Relaxed   Speech is spontaneous  Thought process: Logical and goal directed  Mood is \"ok\"  Affect:  Full  Perception: No avh  Suicidal ideation: No si  Homicidal ideation: No hi  Insight/judgment: Partial  Cognition is grossly intact      Physical Exam:  Musculoskeletal system: steady gait  Tremor not present  Cog wheeling not present      Assessment and Plan:  Dasha Gage meets criteria for a diagnosis of  Mood disorder, unspecified; rule out bipolar disorder; rule out schizoaffective disorder; cocaine use disorder, severe; alcohol use disorder, moderate. Restart neurontin 300mg tid. Continue current medications as prescribed. We will closely monitor for safety. We will encourage reality orientation. Disposition planning to continue. I certify that this patients inpatient psychiatric hospital services furnished since the previous certification were, and continue to be, required for treatment that could reasonably be expected to improve the patient's condition, or for diagnostic study, and that the patient continues to need, on a daily basis, active treatment furnished directly by or requiring the supervision of inpatient psychiatric facility personnel. In addition, the hospital records show that services furnished were intensive treatment services, admission or related services, or equivalent services.       Signed:  Jesusita Fregoso NP  9/26/2020

## 2020-09-27 LAB
ATRIAL RATE: 87 BPM
CALCULATED P AXIS, ECG09: 72 DEGREES
CALCULATED R AXIS, ECG10: -85 DEGREES
CALCULATED T AXIS, ECG11: 63 DEGREES
DIAGNOSIS, 93000: NORMAL
GLUCOSE BLD STRIP.AUTO-MCNC: 121 MG/DL (ref 65–100)
GLUCOSE BLD STRIP.AUTO-MCNC: 126 MG/DL (ref 65–100)
GLUCOSE BLD STRIP.AUTO-MCNC: 95 MG/DL (ref 65–100)
P-R INTERVAL, ECG05: 150 MS
Q-T INTERVAL, ECG07: 408 MS
QRS DURATION, ECG06: 130 MS
QTC CALCULATION (BEZET), ECG08: 490 MS
SERVICE CMNT-IMP: ABNORMAL
SERVICE CMNT-IMP: ABNORMAL
SERVICE CMNT-IMP: NORMAL
VENTRICULAR RATE, ECG03: 87 BPM

## 2020-09-27 PROCEDURE — 74011250637 HC RX REV CODE- 250/637: Performed by: NURSE PRACTITIONER

## 2020-09-27 PROCEDURE — 82962 GLUCOSE BLOOD TEST: CPT

## 2020-09-27 PROCEDURE — 74011250637 HC RX REV CODE- 250/637: Performed by: PSYCHIATRY & NEUROLOGY

## 2020-09-27 PROCEDURE — 65220000003 HC RM SEMIPRIVATE PSYCH

## 2020-09-27 RX ADMIN — LURASIDONE HYDROCHLORIDE 160 MG: 40 TABLET, FILM COATED ORAL at 18:35

## 2020-09-27 RX ADMIN — SERTRALINE HYDROCHLORIDE 50 MG: 50 TABLET ORAL at 08:08

## 2020-09-27 RX ADMIN — METFORMIN HYDROCHLORIDE 1000 MG: 500 TABLET ORAL at 18:35

## 2020-09-27 RX ADMIN — GABAPENTIN 300 MG: 300 CAPSULE ORAL at 21:22

## 2020-09-27 RX ADMIN — GABAPENTIN 300 MG: 300 CAPSULE ORAL at 16:15

## 2020-09-27 RX ADMIN — ATORVASTATIN CALCIUM 10 MG: 10 TABLET, FILM COATED ORAL at 08:08

## 2020-09-27 RX ADMIN — LOSARTAN POTASSIUM 100 MG: 50 TABLET, FILM COATED ORAL at 08:08

## 2020-09-27 RX ADMIN — METFORMIN HYDROCHLORIDE 1000 MG: 500 TABLET ORAL at 07:34

## 2020-09-27 RX ADMIN — GABAPENTIN 300 MG: 300 CAPSULE ORAL at 08:08

## 2020-09-27 NOTE — PROGRESS NOTES
Problem: Altered Thought Process (Adult/Pediatric)  Goal: *STG: Remains safe in hospital  Description: Pt denies any suicidal or homicidal thoughts. Contracts for safety. Remains on q 15 min safety checks. Outcome: Progressing Towards Goal     Received pt in bed resting. Pt appears to be in no distress. Respirations even and unlabored. Continuing to monitor pt with q15 min safety rounds.

## 2020-09-27 NOTE — PROGRESS NOTES
Problem: Discharge Planning  Goal: *Knowledge of medication management  Outcome: Progressing Towards Goal   Medication compliant denies side effects. Problem: Altered Thought Process (Adult/Pediatric)  Goal: *STG: Remains safe in hospital     Pt denies any suicidal or homicidal thoughts. Contracts for safety. Remains on q 15 min safety checks    Problem: Altered Thought Process (Adult/Pediatric)  Goal: *STG: Complies with medication therapy  Description: Stated I want to get started back on my medication. Outcome: Progressing Towards Goal   Medication compliant.     Problem: Altered Thought Process (Adult/Pediatric)  Goal: *STG: Demonstrates ability to understand and use improved judgment in daily activities and relationships  Outcome: Progressing Towards Goal

## 2020-09-27 NOTE — PROGRESS NOTES
PSYCHIATRIC PROGRESS NOTE       Patient: Hudson Nieves MRN: [id number]  SSN: [id number]    YOB: 1960  Age: 62 y.o. Sex: male      Admit Date: 9/25/2020    LOS: 2 days       Chief Complaint:  I am feeling pretty good. Interval History:  Hudson Nieves states he is doing ok. He is calm and pleasant. He denies si hi or avh. Sleeping and eating ok. He denies physical withdrawal symptoms, he's been refusing phebobarb and prefers not to take at all. We will go ahead and dc it altogether. He is requesting to be discharged on Tuesday. Says his neuropathy is better. Past Medical History:  Past Medical History:   Diagnosis Date    Alcohol abuse     Chronic pain     [facility] [address]     Diabetes [facility] [address]     Hepatitis C     Hypertension     Psychiatric disorder     Depression & Schizoaffective Disorder         ALLERGIES:(reviewed/updated 9/27/2020)  Allergies   Allergen Reactions    Tramadol Nausea and Vomiting    Lisinopril Cough     Developed cough while taking lisinopril       Laboratory report:  Lab Results   Component Value Date/Time    WBC 11.6 (H) 09/25/2020 12:27 AM    HGB 13.7 09/25/2020 12:27 AM    HCT 40.4 09/25/2020 12:27 AM    PLATELET 199 90/35/0169 12:27 AM    MCV 89.4 09/25/2020 12:27 AM      Lab Results   Component Value Date/Time    Sodium 135 (L) 09/25/2020 12:27 AM    Potassium 2.8 (L) 09/25/2020 12:27 AM    Chloride 99 09/25/2020 12:27 AM    CO2 25 09/25/2020 12:27 AM    Anion gap 11 09/25/2020 12:27 AM    Glucose 102 (H) 09/26/2020 06:18 AM    BUN 23 (H) 09/25/2020 12:27 AM    Creatinine 1.54 (H) 09/25/2020 12:27 AM    BUN/Creatinine ratio 15 09/25/2020 12:27 AM    GFR est AA 56 (L) 09/25/2020 12:27 AM    GFR est non-AA 46 (L) 09/25/2020 12:27 AM    Calcium 8.7 09/25/2020 12:27 AM    Bilirubin, total 0.2 09/25/2020 12:27 AM    Alk.  phosphatase 78 09/25/2020 12:27 AM    Protein, total 7.4 09/25/2020 12:27 AM    Albumin 3.8 09/25/2020 12:27 AM    Globulin 3.6 09/25/2020 12:27 AM    A-G Ratio 1.1 09/25/2020 12:27 AM    ALT (SGPT) 37 09/25/2020 12:27 AM      Vitals:    09/26/20 1152 09/26/20 1634 09/26/20 1957 09/27/20 0719   BP: 102/69 128/78 108/74 105/70   Pulse: 100 81 91 89   Resp: 16 16 16 16   Temp: 98.1 °F (36.7 °C) 98.2 °F (36.8 °C) 98.6 °F (37 °C) 98.2 °F (36.8 °C)   SpO2: 98% 98% 97% 95%   Weight:    83.3 kg (183 lb 9.6 oz)   Height:           No results found for: VALF2, VALAC, VALP, VALPR, DS6, CRBAM, CRBAMP, CARB2, XCRBAM  No results found for: LITHM    Vital Signs  Patient Vitals for the past 24 hrs:   Temp Pulse Resp BP SpO2   09/27/20 0719 98.2 °F (36.8 °C) 89 16 105/70 95 %   09/26/20 1957 98.6 °F (37 °C) 91 16 108/74 97 %   09/26/20 1634 98.2 °F (36.8 °C) 81 16 128/78 98 %   09/26/20 1152 98.1 °F (36.7 °C) 100 16 102/69 98 %     Wt Readings from Last 3 Encounters:   09/27/20 83.3 kg (183 lb 9.6 oz)   08/09/20 89.5 kg (197 lb 4.8 oz)   08/05/20 92.6 kg (204 lb 2.3 oz)     Temp Readings from Last 3 Encounters:   09/27/20 98.2 °F (36.8 °C)   08/11/20 97.6 °F (36.4 °C)   08/08/20 98.1 °F (36.7 °C)     BP Readings from Last 3 Encounters:   09/27/20 105/70   08/11/20 126/77   08/08/20 139/88     Pulse Readings from Last 3 Encounters:   09/27/20 89   08/11/20 87   08/08/20 82       Radiology (reviewed/updated 9/27/2020)  Xr Chest Port    Result Date: 8/4/2020  EXAM: XR CHEST PORT INDICATION: Chest Pain COMPARISON: 7/29/2015 through 12/1/2014. FINDINGS: A portable AP radiograph of the chest was obtained at 17:12 hours. The patient is on a cardiac monitor. The lungs are clear. The cardiac and mediastinal contours and pulmonary vascularity are normal.  The chest wall structures and visualized upper abdomen show no acute findings with incidental note of degenerative spine and shoulder changes. IMPRESSION: No acute findings.       Current Facility-Administered Medications   Medication Dose Route Frequency Provider Last Rate Last Dose    gabapentin (NEURONTIN) capsule 300 mg  300 mg Oral TID Perez CRAIG NP   300 mg at 09/27/20 1866    sodium chloride (NS) flush 5-40 mL  5-40 mL IntraVENous PRN Timmy Meyer MD        OLANZapine (ZyPREXA) tablet 5 mg  5 mg Oral Q6H PRN Thelma Zhang NP        haloperidol lactate (HALDOL) injection 5 mg  5 mg IntraMUSCular Q6H PRN Thelma Zhang NP        benztropine (COGENTIN) tablet 1 mg  1 mg Oral BID PRN Thelma Zhang NP        diphenhydrAMINE (BENADRYL) injection 50 mg  50 mg IntraMUSCular BID PRN Thelma Zahng NP        hydrOXYzine HCL (ATARAX) tablet 50 mg  50 mg Oral TID PRN Thelma Zhang NP        LORazepam (ATIVAN) injection 1 mg  1 mg IntraMUSCular Q4H PRN Thelma Zhang NP        traZODone (DESYREL) tablet 50 mg  50 mg Oral QHS PRN Thelma Zhang NP        acetaminophen (TYLENOL) tablet 650 mg  650 mg Oral Q4H PRN Thelma Zhang NP   650 mg at 09/26/20 1340    magnesium hydroxide (MILK OF MAGNESIA) 400 mg/5 mL oral suspension 30 mL  30 mL Oral DAILY PRN Thelma Zhang NP        metFORMIN (GLUCOPHAGE) tablet 1,000 mg  1,000 mg Oral BID WITH MEALS Madan SINGLETON MD   1,000 mg at 09/27/20 0734    atorvastatin (LIPITOR) tablet 10 mg  10 mg Oral DAILY Edda Lou MD   10 mg at 09/27/20 8318    sertraline (ZOLOFT) tablet 50 mg  50 mg Oral DAILY Edda Lou MD   50 mg at 09/27/20 7967    lurasidone (LATUDA) tablet 160 mg  160 mg Oral DAILY WITH Willem Worthington MD   160 mg at 09/26/20 1642    losartan (COZAAR) tablet 100 mg  100 mg Oral DAILY Edda Lou MD   100 mg at 09/27/20 5530    PHENobarbitaL (LUMINAL) tablet 32.4 mg  32.4 mg Oral Q6H PRN Thelma Zhang NP        Followed by   Raman Edwards ON 9/28/2020] PHENobarbitaL (LUMINAL) tablet 16.2 mg  16.2 mg Oral Q6H PRN Thelma Zhang NP        nicotine (NICODERM CQ) 21 mg/24 hr patch 1 Patch  1 Patch TransDERmal DAILY Thelma Zhang NP   Stopped at 09/25/20 1300       Side Effects: (reviewed/updated 9/27/2020)  None reported or admitted to. Review of Systems: (reviewed/updated 9/27/2020)  Appetite: good  Sleep: good   All other Review of Systems: negative    Mental Status Exam:  Eye contact: Good eye contact  Psychomotor activity: Relaxed   Speech is spontaneous  Thought process: Logical and goal directed  Mood is \"ok\"  Affect:  Full  Perception: No avh  Suicidal ideation: No si  Homicidal ideation: No hi  Insight/judgment: Partial  Cognition is grossly intact      Physical Exam:  Musculoskeletal system: steady gait  Tremor not present  Cog wheeling not present      Assessment and Plan:  Reid Rodgers meets criteria for a diagnosis of  Mood disorder, unspecified; rule out bipolar disorder; rule out schizoaffective disorder; cocaine use disorder, severe; alcohol use disorder, moderate. Continue current medications as prescribed. We will closely monitor for safety. We will encourage reality orientation. Disposition planning to continue. I certify that this patients inpatient psychiatric hospital services furnished since the previous certification were, and continue to be, required for treatment that could reasonably be expected to improve the patient's condition, or for diagnostic study, and that the patient continues to need, on a daily basis, active treatment furnished directly by or requiring the supervision of inpatient psychiatric facility personnel. In addition, the hospital records show that services furnished were intensive treatment services, admission or related services, or equivalent services.       Signed:  Liz Guthrie NP  9/27/2020

## 2020-09-28 LAB
GLUCOSE BLD STRIP.AUTO-MCNC: 128 MG/DL (ref 65–100)
GLUCOSE BLD STRIP.AUTO-MCNC: 92 MG/DL (ref 65–100)
SERVICE CMNT-IMP: ABNORMAL
SERVICE CMNT-IMP: NORMAL

## 2020-09-28 PROCEDURE — 74011250637 HC RX REV CODE- 250/637: Performed by: PSYCHIATRY & NEUROLOGY

## 2020-09-28 PROCEDURE — 74011250637 HC RX REV CODE- 250/637: Performed by: NURSE PRACTITIONER

## 2020-09-28 PROCEDURE — 82962 GLUCOSE BLOOD TEST: CPT

## 2020-09-28 PROCEDURE — 65220000003 HC RM SEMIPRIVATE PSYCH

## 2020-09-28 RX ADMIN — GABAPENTIN 300 MG: 300 CAPSULE ORAL at 08:23

## 2020-09-28 RX ADMIN — LOSARTAN POTASSIUM 100 MG: 50 TABLET, FILM COATED ORAL at 08:22

## 2020-09-28 RX ADMIN — ATORVASTATIN CALCIUM 10 MG: 10 TABLET, FILM COATED ORAL at 08:22

## 2020-09-28 RX ADMIN — GABAPENTIN 300 MG: 300 CAPSULE ORAL at 21:08

## 2020-09-28 RX ADMIN — LURASIDONE HYDROCHLORIDE 160 MG: 40 TABLET, FILM COATED ORAL at 17:03

## 2020-09-28 RX ADMIN — METFORMIN HYDROCHLORIDE 1000 MG: 500 TABLET ORAL at 08:23

## 2020-09-28 RX ADMIN — SERTRALINE HYDROCHLORIDE 50 MG: 50 TABLET ORAL at 08:23

## 2020-09-28 RX ADMIN — METFORMIN HYDROCHLORIDE 1000 MG: 500 TABLET ORAL at 17:02

## 2020-09-28 RX ADMIN — GABAPENTIN 300 MG: 300 CAPSULE ORAL at 17:02

## 2020-09-28 NOTE — PROGRESS NOTES
PSYCHIATRIC PROGRESS NOTE       Patient: Harley Ferrera MRN: [id number]  SSN: [id number]    YOB: 1960  Age: 62 y.o. Sex: male      Admit Date: 9/25/2020    LOS: 3 days       Chief Complaint:  I feel better. Interval History:  Harley Ferrera reports feeling better today. Says his mood is \"better\" and denies any AH or VH. Pleasant and calm during the inteview. Denies any AH or VH. His SI has decreased markedly and is sleeping well. Denies any adverse events from his medications. Remains interactive with peers and the staff. Past Medical History:  Past Medical History:   Diagnosis Date    Alcohol abuse     Chronic pain     [facility]aine abuse ([facility] [address]     Diabetes ([facility] [address]     Hepatitis C     Hypertension     Psychiatric disorder     Depression & Schizoaffective Disorder         ALLERGIES:(reviewed/updated 9/28/2020)  Allergies   Allergen Reactions    Tramadol Nausea and Vomiting    Lisinopril Cough     Developed cough while taking lisinopril       Laboratory report:  Lab Results   Component Value Date/Time    WBC 11.6 (H) 09/25/2020 12:27 AM    HGB 13.7 09/25/2020 12:27 AM    HCT 40.4 09/25/2020 12:27 AM    PLATELET 101 19/90/6003 12:27 AM    MCV 89.4 09/25/2020 12:27 AM      Lab Results   Component Value Date/Time    Sodium 135 (L) 09/25/2020 12:27 AM    Potassium 2.8 (L) 09/25/2020 12:27 AM    Chloride 99 09/25/2020 12:27 AM    CO2 25 09/25/2020 12:27 AM    Anion gap 11 09/25/2020 12:27 AM    Glucose 102 (H) 09/26/2020 06:18 AM    BUN 23 (H) 09/25/2020 12:27 AM    Creatinine 1.54 (H) 09/25/2020 12:27 AM    BUN/Creatinine ratio 15 09/25/2020 12:27 AM    GFR est AA 56 (L) 09/25/2020 12:27 AM    GFR est non-AA 46 (L) 09/25/2020 12:27 AM    Calcium 8.7 09/25/2020 12:27 AM    Bilirubin, total 0.2 09/25/2020 12:27 AM    Alk.  phosphatase 78 09/25/2020 12:27 AM    Protein, total 7.4 09/25/2020 12:27 AM    Albumin 3.8 09/25/2020 12:27 AM    Globulin 3.6 09/25/2020 12:27 AM    A-G Ratio 1.1 09/25/2020 12:27 AM    ALT (SGPT) 37 09/25/2020 12:27 AM      Vitals:    09/27/20 1540 09/27/20 2009 09/28/20 0812 09/28/20 1150   BP: 116/77 100/70 135/70 112/72   Pulse: 79 83 92 79   Resp: 14 14 16 16   Temp: 98.6 °F (37 °C) 98.8 °F (37.1 °C) 98.1 °F (36.7 °C) 98.5 °F (36.9 °C)   SpO2: 97% 96% 96% 96%   Weight:       Height:           No results found for: VALF2, VALAC, VALP, VALPR, DS6, CRBAM, CRBAMP, CARB2, XCRBAM  No results found for: LITHM    Vital Signs  Patient Vitals for the past 24 hrs:   Temp Pulse Resp BP SpO2   09/28/20 1150 98.5 °F (36.9 °C) 79 16 112/72 96 %   09/28/20 0812 98.1 °F (36.7 °C) 92 16 135/70 96 %   09/27/20 2009 98.8 °F (37.1 °C) 83 14 100/70 96 %   09/27/20 1540 98.6 °F (37 °C) 79 14 116/77 97 %     Wt Readings from Last 3 Encounters:   09/27/20 83.3 kg (183 lb 9.6 oz)   08/09/20 89.5 kg (197 lb 4.8 oz)   08/05/20 92.6 kg (204 lb 2.3 oz)     Temp Readings from Last 3 Encounters:   09/28/20 98.5 °F (36.9 °C)   08/11/20 97.6 °F (36.4 °C)   08/08/20 98.1 °F (36.7 °C)     BP Readings from Last 3 Encounters:   09/28/20 112/72   08/11/20 126/77   08/08/20 139/88     Pulse Readings from Last 3 Encounters:   09/28/20 79   08/11/20 87   08/08/20 82       Radiology (reviewed/updated 9/28/2020)  Xr Chest Port    Result Date: 8/4/2020  EXAM: XR CHEST PORT INDICATION: Chest Pain COMPARISON: 7/29/2015 through 12/1/2014. FINDINGS: A portable AP radiograph of the chest was obtained at 17:12 hours. The patient is on a cardiac monitor. The lungs are clear. The cardiac and mediastinal contours and pulmonary vascularity are normal.  The chest wall structures and visualized upper abdomen show no acute findings with incidental note of degenerative spine and shoulder changes. IMPRESSION: No acute findings.       Current Facility-Administered Medications   Medication Dose Route Frequency Provider Last Rate Last Dose    gabapentin (NEURONTIN) capsule 300 mg  300 mg Oral TID Thelma Zhang NP   300 mg at 09/28/20 3391    sodium chloride (NS) flush 5-40 mL  5-40 mL IntraVENous PRN Ann Pinzon MD        OLANZapine (ZyPREXA) tablet 5 mg  5 mg Oral Q6H PRN Thelma Zhang NP        haloperidol lactate (HALDOL) injection 5 mg  5 mg IntraMUSCular Q6H PRN Thelma Zhang NP        benztropine (COGENTIN) tablet 1 mg  1 mg Oral BID PRN Thelma Zhang NP        diphenhydrAMINE (BENADRYL) injection 50 mg  50 mg IntraMUSCular BID PRN Thelma Zhang NP        hydrOXYzine HCL (ATARAX) tablet 50 mg  50 mg Oral TID PRN Thelma Zhang NP        LORazepam (ATIVAN) injection 1 mg  1 mg IntraMUSCular Q4H PRN Thelma Zhang NP        traZODone (DESYREL) tablet 50 mg  50 mg Oral QHS PRN Thelma Zhang NP        acetaminophen (TYLENOL) tablet 650 mg  650 mg Oral Q4H PRN Thelma Zhang NP   650 mg at 09/26/20 1340    magnesium hydroxide (MILK OF MAGNESIA) 400 mg/5 mL oral suspension 30 mL  30 mL Oral DAILY PRN Thelma Zhang NP        metFORMIN (GLUCOPHAGE) tablet 1,000 mg  1,000 mg Oral BID WITH MEALS Fer SINGLETON MD   1,000 mg at 09/28/20 8029    atorvastatin (LIPITOR) tablet 10 mg  10 mg Oral DAILY Coral Marks MD   10 mg at 09/28/20 0179    sertraline (ZOLOFT) tablet 50 mg  50 mg Oral DAILY Coral Marks MD   50 mg at 09/28/20 1909    lurasidone (LATUDA) tablet 160 mg  160 mg Oral DAILY WITH Moncure Feeling, Socrates SINGLETON MD   160 mg at 09/27/20 1835    losartan (COZAAR) tablet 100 mg  100 mg Oral DAILY Coral Marks MD   100 mg at 09/28/20 0409    PHENobarbitaL (LUMINAL) tablet 32.4 mg  32.4 mg Oral Q6H PRN Thelma Zhang NP        Followed by   García PHENobarbitaL (LUMINAL) tablet 16.2 mg  16.2 mg Oral Q6H PRN Thelma Zhang NP        nicotine (NICODERM CQ) 21 mg/24 hr patch 1 Patch  1 Patch TransDERmal DAILY Thelma Zhang NP   Stopped at 09/25/20 1300       Side Effects: (reviewed/updated 9/28/2020)  None reported or admitted to.     Review of Systems: (reviewed/updated 9/28/2020)  Appetite: good  Sleep: good   All other Review of Systems: negative    Mental Status Exam:  Eye contact: Good eye contact  Psychomotor activity: Relaxed   Speech is spontaneous  Thought process: Logical and goal directed  Mood is \"ok\"  Affect:  Full  Perception: No avh  Suicidal ideation: No si  Homicidal ideation: No hi  Insight/judgment: Partial  Cognition is grossly intact      Physical Exam:  Musculoskeletal system: steady gait  Tremor not present  Cog wheeling not present      Assessment and Plan:  Fara Ortega meets criteria for a diagnosis of  Mood disorder, unspecified; rule out bipolar disorder; rule out schizoaffective disorder; cocaine use disorder, severe; alcohol use disorder, moderate. Continue current medications as prescribed. We will closely monitor for safety. We will encourage reality orientation. Disposition planning to continue. I certify that this patients inpatient psychiatric hospital services furnished since the previous certification were, and continue to be, required for treatment that could reasonably be expected to improve the patient's condition, or for diagnostic study, and that the patient continues to need, on a daily basis, active treatment furnished directly by or requiring the supervision of inpatient psychiatric facility personnel. In addition, the hospital records show that services furnished were intensive treatment services, admission or related services, or equivalent services.       Signed:  Bharti Thurman MD  9/28/2020

## 2020-09-28 NOTE — INTERDISCIPLINARY ROUNDS
Behavioral Health Interdisciplinary Rounds Patient Name: Basilio Edward  Age: 62 y.o. Room/Bed:  730/ Primary Diagnosis: <principal problem not specified> Admission Status: Voluntary Readmission within 30 days: no 
Power of  in place: no 
Patient requires a blocked bed: no          Reason for blocked bed:  
Sleep hours: 7 Participation in Care/Groups:  n/a Medication Compliant?: Yes PRNS (last 24 hours): None Restraints (last 24 hours):  no 
  
Alcohol screening (AUDIT) completed -     If applicable, date SBIRT discussed in treatment team AND documented:   
Tobacco - patient is a smoker: Have You Used Tobacco in the Past 30 Days: No 
Illegal Drugs use: Have You Used Any Illegal Substances Over the Past 12 Months: Yes 
 
24 hour chart check complete: yes Patient goal(s) for today: attend groups, take medications Treatment team focus/goals: titrate medication, coordinate follow up. Progress note: Patient said there a place he can go to with the [place]. Plan for discharge tomorrow. Family Contact information: Amish Florez, brother, [phone] Patient's preferred phone number for follow up call : 
 
LOS:  3  Expected LOS:  
 
Participating treatment team members: Basilio Edward, CHULA Romero; Dr. Natty Hassan; Chuy Mota, PharmD; Mae Diaz, NICHOLAS.

## 2020-09-28 NOTE — BH NOTES
GROUP THERAPY PROGRESS NOTE    Jose Antonio Trujillo is participating in [employer] [facility]    Group time: 1 hour    Personal goal for participation: Prep for Today and Setting Goal(S)     Goal orientation: Eval Your [facility] [employer] [employer]     Group therapy participation: active    Therapeutic interventions reviewed and discussed:     Impression of participation: Engaged, getting along peers and feeling less depressed and hopeful. His stated goals were: attend groups, take a shower and talk with his [place] team about his d/c date.

## 2020-09-28 NOTE — PROGRESS NOTES
Problem: Altered Thought Process (Adult/Pediatric)  Goal: *STG: Seeks staff when feelings of anxiety and fear arise  Outcome: Progressing Towards Goal   Expresses self in a polite manner. Problem: Altered Thought Process (Adult/Pediatric)  Goal: *STG: Complies with medication therapy  Description: Stated I want to get started back on my medication. Outcome: Progressing Towards Goal   Medication compliant denies side effects. Problem: Altered Thought Process (Adult/Pediatric)  Goal: *STG: Decreased delusional thinking  Outcome: Progressing Towards Goal   No delusional content expressed. Problem: Altered Thought Process (Adult/Pediatric)  Goal: *STG: Decreased hallucinations  Description: Admits to command AH at intervals. Outcome: Progressing Towards Goal   Denies AH/VH. Has not been observed responding.

## 2020-09-29 VITALS
RESPIRATION RATE: 18 BRPM | SYSTOLIC BLOOD PRESSURE: 126 MMHG | OXYGEN SATURATION: 95 % | TEMPERATURE: 98 F | BODY MASS INDEX: 24.87 KG/M2 | HEART RATE: 73 BPM | WEIGHT: 183.6 LBS | HEIGHT: 72 IN | DIASTOLIC BLOOD PRESSURE: 80 MMHG

## 2020-09-29 LAB
GLUCOSE BLD STRIP.AUTO-MCNC: 111 MG/DL (ref 65–100)
SERVICE CMNT-IMP: ABNORMAL

## 2020-09-29 PROCEDURE — 74011250637 HC RX REV CODE- 250/637: Performed by: NURSE PRACTITIONER

## 2020-09-29 PROCEDURE — 82962 GLUCOSE BLOOD TEST: CPT

## 2020-09-29 PROCEDURE — 74011250637 HC RX REV CODE- 250/637: Performed by: PSYCHIATRY & NEUROLOGY

## 2020-09-29 RX ORDER — GABAPENTIN 300 MG/1
300 CAPSULE ORAL 3 TIMES DAILY
Qty: 90 CAP | Refills: 0 | Status: ON HOLD | OUTPATIENT
Start: 2020-09-29 | End: 2020-10-12 | Stop reason: SDUPTHER

## 2020-09-29 RX ORDER — SERTRALINE HYDROCHLORIDE 50 MG/1
50 TABLET, FILM COATED ORAL DAILY
Qty: 30 TAB | Refills: 0 | Status: SHIPPED | OUTPATIENT
Start: 2020-09-30 | End: 2020-10-12

## 2020-09-29 RX ADMIN — SERTRALINE HYDROCHLORIDE 50 MG: 50 TABLET ORAL at 08:38

## 2020-09-29 RX ADMIN — METFORMIN HYDROCHLORIDE 1000 MG: 500 TABLET ORAL at 07:58

## 2020-09-29 RX ADMIN — LOSARTAN POTASSIUM 100 MG: 50 TABLET, FILM COATED ORAL at 08:38

## 2020-09-29 RX ADMIN — ATORVASTATIN CALCIUM 10 MG: 10 TABLET, FILM COATED ORAL at 08:38

## 2020-09-29 RX ADMIN — GABAPENTIN 300 MG: 300 CAPSULE ORAL at 08:38

## 2020-09-29 NOTE — PROGRESS NOTES
Pt receiving resting quietly in bed with eyes closed. Respirations equal and unlabored. No acute distress noted. Will continue to monitor through q15 minute safety rounds. Problem: Altered Thought Process (Adult/Pediatric)  Goal: *STG: Remains safe in hospital  Description: Pt denies any suicidal or homicidal thoughts. Contracts for safety. Remains on q 15 min safety checks. Outcome: Progressing Towards Goal     Problem: Falls - Risk of  Goal: *Absence of Falls  Description: Document Jade Chatman Fall Risk and appropriate interventions in the flowsheet.   Outcome: Progressing Towards Goal  Note: Fall Risk Interventions:            Medication Interventions: Teach patient to arise slowly

## 2020-09-29 NOTE — DISCHARGE INSTRUCTIONS
DISCHARGE SUMMARY    NAME:Go Ledbetter  : 1960  MRN: [id number]    The patient Priti Willis exhibits the ability to control behavior in a less restrictive environment. Patient's level of functioning is improving. No assaultive/destructive behavior has been observed for the past 24 hours. No suicidal/homicidal threat or behavior has been observed for the past 24 hours. There is no evidence of serious medication side effects. Patient has not been in physical or protective restraints for at least the past 24 hours. If weapons involved, how are they secured? NA    Is patient aware of and in agreement with discharge plan? Yes    Arrangements for medication:  Prescriptions filled at [facility] of discharge instructions to provider?:  [address] for transportation home:  [facility] to provide transportation. Keep all follow up appointments as scheduled, continue to take prescribed medications per physician instructions. Mental health crisis number:  622 or your local mental health crisis line number at ([facility] crisis line) [phone] & [place] [phone]      SAFETY PLAN    A suicide Safety Plan is a document that supports someone when they are having thoughts of suicide. Warning Signs that indicate a suicidal crisis may be developing: What (situations, thoughts, feelings, body sensations, behaviors, etc.) do you experience that lets you know you are beginning to think about suicide? 1. Increase negative intrusive thoughts that lead to feeling hopelessness and loneliness  2. Suicidal thought that lead to feeling not safe  3. Intense feelings of hopelessness and despair    Internal Coping Strategies:  What things can I do (relaxation techniques, hobbies, physical activities, etc.) to take my mind off my problems without contacting another person? 1. Watch tv  2. Take a walk in the park  3.  Read or pray    People and social settings that provide distraction: Who can I call or where can I go to distract me? 1. Name: friend and room mate Ishmael Bustos  Phone: number is programmed into your phone  2. Name: brother Sania Toribio              Phone: number is programmed into your phone   3. Place[facility]            4. Place: brother's house    People whom I can ask for help: Who can I call when I need help - for example, friends, family, clergy, someone else? 1. Name: friend and room mate Ishmael Bustos  Phone: number is programmed into your phone  2. Name: brother Sania Toribio                          Phone: number is programmed into your phone       Professionals or [facility] agencies I can contact during a crisis: Who can I call for help - for example, my doctor, my psychiatrist, my psychologist, a mental health provider, a suicide hotline? 1. Clinician Name: staff at [facility] at [phone]   2. Clinician Name: staff at  [facility] at [phone]       3. Suicide Prevention Lifeline: 0-841-239-TALK (2047)    4. [address] [place] [address] Emergency Services -  for example, [facility] suicide hotline,      Emergency Services Address: [address] at [phone] and [place] [address] at [phone]      Making the environment safe: How can I make my environment (house/apartment/living space) safer? For example, can I remove guns, medications, and other items? 1. Program your local crisis number at 048-6599 and 4-737.607.6832      Information for Michelle [address] [place] [address] partners with homeless services providers in the [address] [place] [address], the [employer], and public and private agencies to assist households resolve their housing crisis as quickly as possible. Those in crisis can contact Catherine Vega to discuss the situation and to be connected with available resources.    The [address] is open Monday - Friday from 8:30 am - 5:00 pm. Assessments are conducted until 4:00 pm.   [facility] [phone]  If you need shelter in the [place] area, contact the [address] [place] [address]: [phone]. The [address] [place] [address] facilitates access to resources and shelter alternatives for those who are three days or less away from losing their housing. This line is also available for those already experiencing homelessness. DISCHARGE SUMMARY from Nurse    *  Please give a list of your current medications to your Primary Care Provider. *  Please update this list whenever your medications are discontinued, doses are      changed, or new medications (including over-the-counter products) are added. *  Please carry medication information at all times in case of emergency situations. These are general instructions for a healthy lifestyle:    No smoking/ No tobacco products/ Avoid exposure to second hand smoke  Surgeon General's Warning:  Quitting smoking now greatly reduces serious risk to your health. Obesity, smoking, and sedentary lifestyle greatly increases your risk for illness    A healthy diet, regular physical exercise & weight monitoring are important for maintaining a healthy lifestyle    You may be retaining fluid if you have a history of heart failure or if you experience any of the following symptoms:  Weight gain of 3 pounds or more overnight or 5 pounds in a week, increased swelling in our hands or feet or shortness of breath while lying flat in bed. Please call your doctor as soon as you notice any of these symptoms; do not wait until your next office visit. The discharge information has been reviewed with the patient. The patient verbalized understanding.   Discharge medications reviewed with the patient and appropriate educational materials and side effects teaching were provided.   ___________________________________________________________________________________________________________________________________

## 2020-09-29 NOTE — INTERDISCIPLINARY ROUNDS
Behavioral Health Interdisciplinary Rounds Patient Name: Beth Ritchie  Age: 62 y.o. Room/Bed:  730/ Primary Diagnosis: <principal problem not specified> Admission Status: Voluntary Readmission within 30 days: no 
Power of  in place: no 
Patient requires a blocked bed: no          Reason for blocked bed: VTE Prophylaxis: No 
 
Mobility needs/Fall risk: no 
Flu Vaccine : no  
Nutritional Plan: no 
Consults:         
Labs/Testing due today?: no 
 
Sleep hours:  9 Participation in Care/Groups:  yes Medication Compliant?: Yes PRNS (last 24 hours): None Restraints (last 24 hours):  no 
  
CIWA (range last 24 hours): CIWA-Ar Total: 0  
COWS (range last 24 hours): Alcohol screening (AUDIT) completed - If applicable, date SBIRT discussed in treatment team AND documented:  
AUDIT Screen Score: Tobacco - patient is a smoker: Have You Used Tobacco in the Past 30 Days: No 
Illegal Drugs use: Have You Used Any Illegal Substances Over the Past 12 Months: Yes 
 
24 hour chart check complete:  
 
Patient goal(s) for today:  
Treatment team focus/goals:  
Progress note LOS:  4  Expected LOS:  
 
Financial concerns/prescription coverage:  
Family contact:      
Family requesting physician contact today:  
Discharge plan: Access to weapons :       
Outpatient provider(s): 
Patient's preferred phone number for follow up call :  
 
Participating treatment team members: Beth Ritchie, * (assigned SW),

## 2020-09-29 NOTE — BH NOTES
At approximately 3662 the tech went to get the patient for group. The tech noticed the patient was sleeping and did not disturb the patient.

## 2020-09-29 NOTE — BH NOTES
GROUP THERAPY PROGRESS NOTE    Patient is participating in Substance Abuse group. Group time: 50 minutes    Personal goal for participation: To identify addiction stereotypes and understand the danger of them. Goal orientation: Personal    Group therapy participation: active    Therapeutic interventions reviewed and discussed: Group discussion of substance use, abuse, and addiction stereotype. Patients were able to explore different labels people have called them and understand the reality most addicts do not fit under the category of alcoholic or drug addict stereotypes. Group discussed how they feel and can disguise their problems from family members because they are high functioning addicts.  Patients explored what high functioning addicts look like and the potential dangers of these stereotypes. Group discussed ways to educate family, friends, and loved ones about the truth of addiction. Impression of participation: Linda Malcolm was active in group this afternoon, but less engaged than earlier this morning. When asked to share about his alcohol/drug experience he refused and stated he did not want to open up like that.      Sam Bain, Supervisee in Social Work

## 2020-09-29 NOTE — BH NOTES
DISCHARGE SUMMARY from Nurse    PATIENT INSTRUCTIONS:  What to do at Home:  Recommended activity: Activity as tolerated,   *  Please give a list of your current medications to your Primary Care Provider. *  Please update this list whenever your medications are discontinued, doses are      changed, or new medications (including over-the-counter products) are added. *  Please carry medication information at all times in case of emergency situations. These are general instructions for a healthy lifestyle:    No smoking/ No tobacco products/ Avoid exposure to second hand smoke  Surgeon General's Warning:  Quitting smoking now greatly reduces serious risk to your health. Obesity, smoking, and sedentary lifestyle greatly increases your risk for illness    A healthy diet, regular physical exercise & weight monitoring are important for maintaining a healthy lifestyle    You may be retaining fluid if you have a history of heart failure or if you experience any of the following symptoms:  Weight gain of 3 pounds or more overnight or 5 pounds in a week, increased swelling in our hands or feet or shortness of breath while lying flat in bed. Please call your doctor as soon as you notice any of these symptoms; do not wait until your next office visit. The discharge information has been reviewed with the patient. The patient verbalized understanding. Discharge medications reviewed with the patient and appropriate educational materials and side effects teaching were provided. Prescriptions filled per discharge. [employer] to provide transport to residence.  ___________________________________________________________________________________________________________________________________

## 2020-09-29 NOTE — PROGRESS NOTES
Problem: Altered Thought Process (Adult/Pediatric)  Goal: *STG: Participates in treatment plan  Description: Met with treatment team with active participation  Outcome: Progressing Towards Goal  Pt complies with medications. No subjective complaints or objective signs of psychotic thinking.

## 2020-09-29 NOTE — PROGRESS NOTES
Problem: Discharge Planning  Goal: *Knowledge of medication management  Outcome: Progressing Towards Goal     0700 Rec'd patient this am in room. He was smiling \"I slept well\", \"I feel good\"  0800 Ate breakfast in dining room with peers, Latoya Tolbert is smiling and talking with peers this morning.

## 2020-09-29 NOTE — BH NOTES
GROUP THERAPY PROGRESS NOTE    Patient is participating in Process group. Group time: 50 minutes    Personal goal for participation: To recognize the different ways people manage anger. Goal orientation: Personal    Group therapy participation: active    Therapeutic interventions reviewed and discussed: Group discussion was focused identifying which anger management style the patient tends to use and to understand the consequences. Group members were asked to check off the boxes that they resonate with the most in each column (4). They were then asked to total up the numbers and see which column they had the most checked boxes in, each column resembled a style of anger: Aggressive, Passive, Passive-Aggressive, and Assertive. Group members then discussed the consequences of this style on their personal lives and give examples of each style. The group then processed their experience, and leader emphasized the value and benefits of the assertive style. Impression of participation: Eloy Rios actively participated in group today. He is fixated on discharge. He explained how he fit into passive style anger. He also said that one positive thing about himself is that he is no longer in snf and doing what he used to do before.     Sam Bain, Supervisee in Social Work

## 2020-09-29 NOTE — DISCHARGE SUMMARY
DISCHARGE SUMMARY    Some parts of the discharge summary are from the initial Psychiatric interview that was done on admission by the admitting psychiatrist.      Date of Admission: 9/25/2020    Date of Discharge:9/29/2020     TYPE OF DISCHARGE:   REGULAR -  YES    AMA  RELEASED BY THE [facility] COURT     CHIEF COMPLAINT:  \"I don't feel so good. \"     HISTORY OF PRESENT ILLNESS:  The patient is a 61-year-old [facility] [place] man who is currently admitted after he presented to the ER at [address] requesting help for thoughts of suicide and homicide. He had reported that he was having thoughts of wanting to kill several people or setting fire to home with people inside it because he was angry at himself in the world. States that he was also having thoughts of suicide and feels hopeless and depressed. He was discharged from [facility] for a psychiatric admission 9 or 10 days ago and says that he has been feeling worse right after discharge. States that he has been compliant with taking all of his medications, but he struggled to remember what his medications were and the dosages. His urine drug screen was positive for cocaine and says he has been using cocaine daily although he could not quantify the amount and frequency of usage. States that he has also been drinking, but says he only drinks a little bit every day and does not want help for alcohol withdrawal symptoms. Since his arrival on the unit, he has been somewhat irritable with nursing staff, easily agitated and labile in his moods. He was minimally responsive to my questions during the interview.   In the ER, the staff reported that they found one of the cords in the ER suite around his neck and he had told them he was going to end his life.     PAST MEDICAL HISTORY:  Reviewed as per the history and physical exam.             Past Medical History:   Diagnosis Date    Alcohol abuse      Chronic pain      Cocaine abuse ([facility] [address] [facility]      Diabetes ([facility]      Hepatitis C      Hypertension      Psychiatric disorder       Depression & Schizoaffective Disorder              Prior to Admission medications    Medication Sig Start Date End Date Taking? Authorizing Provider   valsartan (DIOVAN) 160 mg tablet Take 1 Tab by mouth daily. Indications: high blood pressure 8/11/20     Thelma Zhang NP   lurasidone (LATUDA) 120 mg tab tablet Take 1 Tab by mouth daily (with dinner). Take 1 tablet with dinner take it with 350 calories of meals  Indications: bipolar depression 8/11/20     Luis Fernando CRAIG NP   metFORMIN (GLUCOPHAGE) 1,000 mg tablet Take 1 Tab by mouth two (2) times daily (with meals). Indications: type 2 diabetes mellitus 7/2/20     Fina Brar MD   diclofenac (VOLTAREN) 1 % gel AAA in thin coat BID as needed for painful joints  Indications: joint damage causing pain and loss of function 6/29/20     Kalina Vazquez MD   atorvastatin (LIPITOR) 10 mg tablet Take 1 Tab by mouth daily.  Indications: prevent stroke 5/22/20     Fina Brar MD             Vitals:     09/27/20 1128 09/27/20 1540 09/27/20 2009 09/28/20 0812   BP: 106/75 116/77 100/70 135/70   Pulse: 91 79 83 92   Resp: 16 14 14 16   Temp: 98.5 °F (36.9 °C) 98.6 °F (37 °C) 98.8 °F (37.1 °C) 98.1 °F (36.7 °C)   SpO2: 98% 97% 96% 96%   Weight:           Height:                    Lab Results   Component Value Date/Time     WBC 11.6 (H) 09/25/2020 12:27 AM     HGB 13.7 09/25/2020 12:27 AM     HCT 40.4 09/25/2020 12:27 AM     PLATELET 285 94/28/3385 12:27 AM     MCV 89.4 09/25/2020 12:27 AM            Lab Results   Component Value Date/Time     Sodium 135 (L) 09/25/2020 12:27 AM     Potassium 2.8 (L) 09/25/2020 12:27 AM     Chloride 99 09/25/2020 12:27 AM     CO2 25 09/25/2020 12:27 AM     Anion gap 11 09/25/2020 12:27 AM     Glucose 102 (H) 09/26/2020 06:18 AM     BUN 23 (H) 09/25/2020 12:27 AM     Creatinine 1.54 (H) 09/25/2020 12:27 AM     BUN/Creatinine ratio 15 09/25/2020 12:27 AM     GFR est AA 56 (L) 09/25/2020 12:27 AM     GFR est non-AA 46 (L) 09/25/2020 12:27 AM     Calcium 8.7 09/25/2020 12:27 AM     Bilirubin, total 0.2 09/25/2020 12:27 AM     Alk. phosphatase 78 09/25/2020 12:27 AM     Protein, total 7.4 09/25/2020 12:27 AM     Albumin 3.8 09/25/2020 12:27 AM     Globulin 3.6 09/25/2020 12:27 AM     A-G Ratio 1.1 09/25/2020 12:27 AM     ALT (SGPT) 37 09/25/2020 12:27 AM     AST (SGOT) 25 09/25/2020 12:27 AM      No results found for: VALF2, VALAC, VALP, VALPR, DS6, CRBAM, CRBAMP, CARB2, XCRBAM  No results found for: LITHM  RADIOLOGY REPORTS:(reviewed/updated 9/28/2020)  Xr Chest Port     Result Date: 8/4/2020  EXAM: XR CHEST PORT INDICATION: Chest Pain COMPARISON: 7/29/2015 through 12/1/2014. FINDINGS: A portable AP radiograph of the chest was obtained at 17:12 hours. The patient is on a cardiac monitor. The lungs are clear. The cardiac and mediastinal contours and pulmonary vascularity are normal.  The chest wall structures and visualized upper abdomen show no acute findings with incidental note of degenerative spine and shoulder changes.      IMPRESSION: No acute findings.        PAST PSYCHIATRIC HISTORY:  The patient reports that he has been in psychiatric treatment since the age of 24 years and has been hospitalized numerous times. He was hospitalized twice at [facility] recently including in August and July. Prior to that, he has had hospitalizations at [facility], [facility].  Most recently, he says, he has been getting some of his medications from the [place] but could not remember who his provider was or when he last went there. There is a long history of cocaine abuse as well as intermittent heavy drinking.   Denies any history of withdrawal seizures or complicated withdrawal.     PSYCHOSOCIAL HISTORY:  The patient reports that he is currently homeless and has been for about a month which has contributed to some of his depression. States that he was living with his brother, but they have not recently been getting along well together and he was asked to leave the house. He has never been  and has three grown children with whom he does not keep in touch. Says he does not know where they live. Currently, he is not in a relationship. Gets social security disability income. The patient served in the [employer] from [address] to 81 and was discharged based on his psychiatric condition. Denies any major legal stressors at the present time.     MENTAL STATUS EXAM:  The patient is a middle-aged [facility] American man who is dressed in hospital apparel. He is calm and cooperative and makes fair eye contact. Mood is reported as being down. Speech is spontaneous, coherent but markedly reduced in output. Psychomotor activity is reduced too. Passive thoughts of suicide are present as noted above. Denies any homicidal ideation at the present time. He did not answer questions about perception. Denies any delusions. His thought process is mildly disorganized. Cognitively, he is awake and alert, oriented to time, place and person, but a full cognitive evaluation was not present as he would not answer my questions. Insight is poor. Judgment is poor.     ASSESSMENT AND PLAN/DIAGNOSES:  Mood disorder, unspecified; rule out bipolar disorder; rule out schizoaffective disorder; cocaine use disorder, severe; alcohol use disorder, moderate.     I will continue his inpatient stay. He will be observed for development of withdrawal symptoms. Support will be provided and he will attend groups. Estimated length of stay is 5-7 days. His strengths include his ability to seek help and support from therapeutic providers.     Course in the Hospital:     Patient was admitted to the inpatient psychiatry unit for acute psychiatric stabilization in regards to symptomatology as described in the HPI above and placed on Q15 minute checks and withdrawal precautions. While on the unit Gladys Bellamy was involved in individual, group, occupational and milieu therapy. He was started back on his usual medication regimen as well as PRN medications including Latuda and Zoloft. He improved gradually and was able to integrate into the milieu with help from the nursing staff. Patients symptoms improved gradually including withdrawal symptoms, poor sleep, paranoid ideation, AH, poor energy, irritability. He was quite on the unit, appropriate in his interactions, and cooperative with medications and the unit routine. Please see individual progress notes for more specific details regarding patient's hospitalization course. Patient was discharged as per the plan. He had been doing well on the unit as per the report of the nursing staff and my observations. No PRN medication for agitation, seclusion or restraints were required during the last 48 hours of her stay. Gladys Bellamy had improved progressively to the point of being stable for discharge and outpatient FU. At this time he did not offer any complaints. Patient denied any SI or HI. Denied any AH or VH. He denied any delusions. Was not considered a danger to self or to others and is safe for discharge. Will FU with his appointments and remains motivated to be in treatment. The patient verbalized understanding of his discharge instructions. DISCHARGE DIAGNOSIS:   Mood disorder, unspecified; rule out bipolar disorder; rule out schizoaffective disorder; cocaine use disorder, severe; alcohol use disorder, moderate. MENTAL STATUS EXAM ON DISCHARGE:    General appearance:   Gladys Bellamy is a 62 y.o. BLACK OR  male who is well groomed, psychomotor activity is WNL  Eye contact: makes good eye contact  Speech: Spontaneous and coherent  Affect : Euthymic  Mood: \"OK\"  Thought Process: Logical, goal directed  Perception: Denies any AH or VH.    Thought Content: Denies any SI or Plan  Insight: Partial  Judgement: Fair  Cognition: Intact grossly. Current Discharge Medication List      START taking these medications    Details   gabapentin (NEURONTIN) 300 mg capsule Take 1 Cap by mouth three (3) times daily. Max Daily Amount: 900 mg. Indications: neuropathic pain  Qty: 90 Cap, Refills: 0    Associated Diagnoses: Alcohol abuse         CONTINUE these medications which have CHANGED    Details   lurasidone (LATUDA) 80 mg tab tablet Take 2 Tabs by mouth daily (with dinner) for 30 days. Indications: bipolar depression  Qty: 60 Tab, Refills: 0      sertraline (ZOLOFT) 50 mg tablet Take 1 Tab by mouth daily. Indications: anxiousness associated with depression  Qty: 30 Tab, Refills: 0         CONTINUE these medications which have NOT CHANGED    Details   valsartan (DIOVAN) 160 mg tablet Take 1 Tab by mouth daily. Indications: high blood pressure  Qty: 14 Tab, Refills: 0      metFORMIN (GLUCOPHAGE) 1,000 mg tablet Take 1 Tab by mouth two (2) times daily (with meals). Indications: type 2 diabetes mellitus  Qty: 180 Tab, Refills: 3    Comments: [facility] pt  Associated Diagnoses: Controlled type 2 diabetes mellitus without complication, without long-term current use of insulin (HCC)      atorvastatin (LIPITOR) 10 mg tablet Take 1 Tab by mouth daily.  Indications: prevent stroke  Qty: 90 Tab, Refills: 3    Associated Diagnoses: Mixed hyperlipidemia         STOP taking these medications       diclofenac (VOLTAREN) 1 % gel Comments:   Reason for Stopping:              No results found for: VALF2, VALAC, VALP, VALPR, DS6, CRBAM, CRBAMP, CARB2, XCRBAM  No results found for: [facility]  Follow-up Information     Follow up With Specialties Details Why Contact Info    [facility]     [address],  [address]  Office Number: [phone] ext [phone] [facility])  Fax: [phone]    The [address]   If you need shelter in the [facility] [place], contact the Homeless Crisis Line: [phone]. Those in crisis can contact Rick Garcia Steven to discuss needs and to be connected with resources. The Center is open Monday-Friday 8:30am-5pm. [address] [place] [address]  [place] [address], [address]  [address] [phone] and ask to complete an intake to be linked with mental health services for psychitary and substance abuse. [address]  [phone]      [facility]   arrive by 7am, when doors open at 7:30am register for intake to be linked with medical and mental health services. [facility]  [address] [facility] [place]  [phone]    Talya Osman MD Family Medicine   [address] [place] [address]  [phone]          WOUND CARE: none needed. PROGNOSIS:   Good / Fair based on nature of patient's pathology/ies and treatment compliance issues. Prognosis is greatly dependent upon patient's ability to  follow up on psychiatric/psychotherapy appointments as well as to comply with psychiatric medications as prescribed.

## 2020-09-30 NOTE — BH NOTES
Behavioral Health Transition Record to Provider    Patient Name: Rayray Delarosa  YOB: 1960  Medical Record Number: [id number]  Date of Admission: 9/25/2020  Date of Discharge: 9/29/2020     Attending Provider: No att. providers found  Discharging Provider: Dr. Krystle Gomez   To contact this individual call [phone] and ask the  to page. If unavailable, ask to be transferred to [facility] Provider on call. Raheem Dunn Provider will be available on call 24/7 and during holidays. Primary Care Provider: Kimberly Hauser MD    Allergies   Allergen Reactions    Tramadol Nausea and Vomiting    Lisinopril Cough     Developed cough while taking lisinopril       Reason for Admission:CHIEF COMPLAINT:  \"I don't feel so good. \"     HISTORY OF PRESENT ILLNESS:  The patient is a 81-year-old [place] American man who is currently admitted after he presented to the ER at [facility] requesting help for thoughts of suicide and homicide. Jenn Sheikh had reported that he was having thoughts of wanting to kill several people or setting fire to home with people inside it because he was angry at himself in the world.  States that he was also having thoughts of suicide and feels hopeless and depressed. Jenn Sheikh was discharged from [facility] for a psychiatric admission 9 or 10 days ago and says that he has been feeling worse right after discharge.  States that he has been compliant with taking all of his medications, but he struggled to remember what his medications were and the dosages.  His urine drug screen was positive for cocaine and says he has been using cocaine daily although he could not quantify the amount and frequency of usage.  States that he has also been drinking, but says he only drinks a little bit every day and does not want help for alcohol withdrawal symptoms.  Since his arrival on the unit, he has been somewhat irritable with nursing staff, easily agitated and labile in his moods. Lina Davis was minimally responsive to my questions during the interview.  In the ER, the staff reported that they found one of the cords in the ER suite around his neck and he had told them he was going to end his life.        Admission Diagnosis: Schizoaffective disorder ([facility] [F25.9]  Substance induced mood disorder ([facility] [F19.94]    * No surgery found *    Results for orders placed or performed during the hospital encounter of 09/25/20   URINE CULTURE HOLD SAMPLE    Specimen: Serum; Urine   Result Value Ref Range    Urine culture hold        Urine on hold in Microbiology dept for 2 days. If unpreserved urine is submitted, it cannot be used for addtional testing after 24 hours, recollection will be required. CBC WITH AUTOMATED DIFF   Result Value Ref Range    WBC 11.6 (H) 4.1 - 11.1 K/uL    RBC 4.52 4.10 - 5.70 M/uL    HGB 13.7 12.1 - 17.0 g/dL    HCT 40.4 36.6 - 50.3 %    MCV 89.4 80.0 - 99.0 FL    MCH 30.3 26.0 - 34.0 PG    MCHC 33.9 30.0 - 36.5 g/dL    RDW 12.8 11.5 - 14.5 %    PLATELET 462 375 - 970 K/uL    MPV 9.8 8.9 - 12.9 FL    NRBC 0.0 0  WBC    ABSOLUTE NRBC 0.00 0.00 - 0.01 K/uL    NEUTROPHILS 51 32 - 75 %    LYMPHOCYTES 41 12 - 49 %    MONOCYTES 5 5 - 13 %    EOSINOPHILS 1 0 - 7 %    BASOPHILS 1 0 - 1 %    IMMATURE GRANULOCYTES 1 (H) 0.0 - 0.5 %    ABS. NEUTROPHILS 6.0 1.8 - 8.0 K/UL    ABS. LYMPHOCYTES 4.8 (H) 0.8 - 3.5 K/UL    ABS. MONOCYTES 0.6 0.0 - 1.0 K/UL    ABS. EOSINOPHILS 0.1 0.0 - 0.4 K/UL    ABS. BASOPHILS 0.1 0.0 - 0.1 K/UL    ABS. IMM.  GRANS. 0.1 (H) 0.00 - 0.04 K/UL    DF AUTOMATED     METABOLIC PANEL, COMPREHENSIVE   Result Value Ref Range    Sodium 135 (L) 136 - 145 mmol/L    Potassium 2.8 (L) 3.5 - 5.1 mmol/L    Chloride 99 97 - 108 mmol/L    CO2 25 21 - 32 mmol/L    Anion gap 11 5 - 15 mmol/L    Glucose 130 (H) 65 - 100 mg/dL    BUN 23 (H) 6 - 20 MG/DL    Creatinine 1.54 (H) 0.70 - 1.30 MG/DL    BUN/Creatinine ratio 15 12 - 20      GFR est AA 56 (L) >60 ml/min/1.73m2 GFR est non-AA 46 (L) >60 ml/min/1.73m2    Calcium 8.7 8.5 - 10.1 MG/DL    Bilirubin, total 0.2 0.2 - 1.0 MG/DL    ALT (SGPT) 37 12 - 78 U/L    AST (SGOT) 25 15 - 37 U/L    Alk.  phosphatase 78 45 - 117 U/L    Protein, total 7.4 6.4 - 8.2 g/dL    Albumin 3.8 3.5 - 5.0 g/dL    Globulin 3.6 2.0 - 4.0 g/dL    A-G Ratio 1.1 1.1 - 2.2     ETHYL ALCOHOL   Result Value Ref Range    ALCOHOL(ETHYL),SERUM 218 (H) <10 MG/DL   URINALYSIS W/MICROSCOPIC   Result Value Ref Range    Color YELLOW/STRAW      Appearance CLEAR CLEAR      Specific gravity 1.010 1.003 - 1.030      pH (UA) 5.0 5.0 - 8.0      Protein Negative NEG mg/dL    Glucose Negative NEG mg/dL    Ketone Negative NEG mg/dL    Bilirubin Negative NEG      Blood Negative NEG      Urobilinogen 0.2 0.2 - 1.0 EU/dL    Nitrites Negative NEG      Leukocyte Esterase Negative NEG      WBC 0-4 0 - 4 /hpf    RBC 0-5 0 - 5 /hpf    Epithelial cells FEW FEW /lpf    Bacteria Negative NEG /hpf   DRUG SCREEN, URINE   Result Value Ref Range    AMPHETAMINES Negative NEG      BARBITURATES Negative NEG      BENZODIAZEPINES Negative NEG      COCAINE Positive (A) NEG      METHADONE Negative NEG      OPIATES Negative NEG      PCP(PHENCYCLIDINE) Negative NEG      THC (TH-CANNABINOL) Negative NEG      Drug screen comment (NOTE)    SARS-COV-2   Result Value Ref Range    Specimen source Nasopharyngeal      Specimen source Nasopharyngeal      COVID-19 rapid test Not detected NOTD      Specimen type NP Swab     SARS-COV-2 ANTIGEN DETECTION   Result Value Ref Range    Specimen type NP Swab      Health status Symptomatic Testing      COVID-19 Not Detected Not Detected     TSH 3RD GENERATION   Result Value Ref Range    TSH 0.63 0.36 - 3.74 uIU/mL   GLUCOSE, FASTING   Result Value Ref Range    Glucose 102 (H) 65 - 100 MG/DL   LIPID PANEL   Result Value Ref Range    LIPID PROFILE          Cholesterol, total 158 <200 MG/DL    Triglyceride 200 (H) <150 MG/DL    HDL Cholesterol 45 MG/DL    LDL, calculated 73 0 - 100 MG/DL    VLDL, calculated 40 MG/DL    CHOL/HDL Ratio 3.5 0.0 - 5.0     GLUCOSE, POC   Result Value Ref Range    Glucose (POC) 117 (H) 65 - 100 mg/dL    Performed by Janece Sep Izac    GLUCOSE, POC   Result Value Ref Range    Glucose (POC) 125 (H) 65 - 100 mg/dL    Performed by Morro Gallo    GLUCOSE, POC   Result Value Ref Range    Glucose (POC) 121 (H) 65 - 100 mg/dL    Performed by Janece Sep Izac    GLUCOSE, POC   Result Value Ref Range    Glucose (POC) 121 (H) 65 - 100 mg/dL    Performed by Kayla Sapp    GLUCOSE, POC   Result Value Ref Range    Glucose (POC) 95 65 - 100 mg/dL    Performed by Clois Cogan JESSICA    GLUCOSE, POC   Result Value Ref Range    Glucose (POC) 126 (H) 65 - 100 mg/dL    Performed by Amanda Francois    GLUCOSE, POC   Result Value Ref Range    Glucose (POC) 128 (H) 65 - 100 mg/dL    Performed by Suzi Crowell    GLUCOSE, POC   Result Value Ref Range    Glucose (POC) 92 65 - 100 mg/dL    Performed by Janece Sep Izac    GLUCOSE, POC   Result Value Ref Range    Glucose (POC) 111 (H) 65 - 100 mg/dL    Performed by HITESH DUPONT (CON)    EKG, 12 LEAD, INITIAL   Result Value Ref Range    Ventricular Rate 87 BPM    Atrial Rate 87 BPM    P-R Interval 150 ms    QRS Duration 130 ms    Q-T Interval 408 ms    QTC Calculation (Bezet) 490 ms    Calculated P Axis 72 degrees    Calculated R Axis -85 degrees    Calculated T Axis 63 degrees    Diagnosis       Sinus rhythm with premature atrial complexes  Right bundle branch block  Left anterior fascicular block  ** Bifascicular block **  When compared with ECG of 04-AUG-2020 18:04,  premature atrial complexes are now present  Right bundle branch block is now present  Confirmed by Aquilino Alvares MD. ([id number]) on 9/27/2020 9:15:13 PM         Immunizations administered during this encounter:   Immunization History   Administered Date(s) Administered    Influenza Vaccine PF 12/02/2014    Pneumococcal Polysaccharide (PPSV-23) 12/02/2014 Screening for Metabolic Disorders for Patients on Antipsychotic Medications  (Data obtained from the EMR)    Estimated Body Mass Index  Estimated body mass index is 24.9 kg/m² as calculated from the following:    Height as of this encounter: 6' (1.829 m). Weight as of this encounter: 83.3 kg (183 lb 9.6 oz). Vital Signs/Blood Pressure  Visit Vitals  /80 (BP 1 Location: Left arm, BP Patient Position: Sitting)   Pulse 73   Temp 98 °F (36.7 °C)   Resp 18   Ht 6' (1.829 m)   Wt 83.3 kg (183 lb 9.6 oz)   SpO2 95%   BMI 24.90 kg/m²       Blood Glucose/Hemoglobin A1c  Lab Results   Component Value Date/Time    Glucose 102 (H) 09/26/2020 06:18 AM    Glucose (POC) 111 (H) 09/29/2020 07:21 AM       Lab Results   Component Value Date/Time    Hemoglobin A1c 6.7 (H) 06/12/2020 03:53 PM        Lipid Panel  Lab Results   Component Value Date/Time    Cholesterol, total 158 09/26/2020 06:18 AM    HDL Cholesterol 45 09/26/2020 06:18 AM    LDL, calculated 73 09/26/2020 06:18 AM    Triglyceride 200 (H) 09/26/2020 06:18 AM    CHOL/HDL Ratio 3.5 09/26/2020 06:18 AM        Discharge Diagnosis:  Mood disorder, unspecified; rule out bipolar disorder; rule out schizoaffective disorder; cocaine use disorder, severe; alcohol use disorder, moderate.       Discharge Plan: He was discarge to [facility]     The patient Debora Marc exhibits the ability to control behavior in a less restrictive environment. Patient's level of functioning is improving. No assaultive/destructive behavior has been observed for the past 24 hours. No suicidal/homicidal threat or behavior has been observed for the past 24 hours. There is no evidence of serious medication side effects. Patient has not been in physical or protective restraints for at least the past 24 hours. If weapons involved, how are they secured? NA    Is patient aware of and in agreement with discharge plan?  Yes    Arrangements for medication: Prescriptions filled at [facility] of discharge instructions to provider?:  [address] for transportation home:  [facility] to provide transportation. Keep all follow up appointments as scheduled, continue to take prescribed medications per physician instructions. Mental health crisis number:  302 or your local mental health crisis line number at (veterans' crisis line) [phone] & Denia Vega) [phone]      SAFETY PLAN    A suicide Safety Plan is a document that supports someone when they are having thoughts of suicide. Warning Signs that indicate a suicidal crisis may be developing: What (situations, thoughts, feelings, body sensations, behaviors, etc.) do you experience that lets you know you are beginning to think about suicide? 1. Increase negative intrusive thoughts that lead to feeling hopelessness and loneliness  2. Suicidal thought that lead to feeling not safe  3. Intense feelings of hopelessness and despair    Internal Coping Strategies:  What things can I do (relaxation techniques, hobbies, physical activities, etc.) to take my mind off my problems without contacting another person? 1. Watch tv  2. Take a walk in the park  3. Read or pray    People and social settings that provide distraction: Who can I call or where can I go to distract me? 1. Name: friend and room mate Rohan Sol  Phone: number is programmed into your phone  2. Name: brother Lieutenant Ascencio              Phone: number is programmed into your phone   3. Place: [facility]            4. Place: brother's house    People whom I can ask for help: Who can I call when I need help - for example, friends, family, clergy, someone else? 1. Name: friend and room mate Rohan Sol  Phone: number is programmed into your phone  2.  Name: brother Lieutenant Ascencio                          Phone: number is programmed into your phone       Professionals or [facility] agencies I can contact during a crisis: Who can I call for help - for example, my doctor, my psychiatrist, my psychologist, a mental health provider, a suicide hotline? 1. Clinician Name: staff at [facility] at [phone]   2. Clinician Name: staff at [address] at [phone]       3. Suicide Prevention Lifeline: 1-996-935-TALK (4862)    4. [address] [place] [address] Emergency Services -  for example, [facility], local county suicide hotline,      Emergency Services Address: [address] at [phone] and [facility] nvitealexandre Silva  at [phone]      Making the environment safe: How can I make my environment (house/apartment/living space) safer? For example, can I remove guns, medications, and other items? 1. Program your local crisis number at 206-9671 and 7-547.569.5148      Information for [address] partners with homeless services providers in the [address] [place] [address], the [employer] on Homeless[employer], and public and private agencies to assist households resolve their housing crisis as quickly as possible. Those in crisis can contact Catherine Vega to discuss the situation and to be connected with available resources. The [address] is open Monday - Friday from 8:30 am - 5:00 pm. Assessments are conducted until 4:00 pm.   [address] [place] [address] [phone]  If you need shelter in the [facility], contact the [address]: [phone]. The [address] [place] [address] St facilitates access to resources and shelter alternatives for those who are three days or less away from losing their housing. This line is also available for those already experiencing homelessness. DISCHARGE SUMMARY from Nurse    *  Please give a list of your current medications to your Primary Care Provider.     *  Please update this list whenever your medications are discontinued, doses are      changed, or new medications (including over-the-counter products) are added. *  Please carry medication information at all times in case of emergency situations. These are general instructions for a healthy lifestyle:    No smoking/ No tobacco products/ Avoid exposure to second hand smoke  Surgeon General's Warning:  Quitting smoking now greatly reduces serious risk to your health. Obesity, smoking, and sedentary lifestyle greatly increases your risk for illness    A healthy diet, regular physical exercise & weight monitoring are important for maintaining a healthy lifestyle    You may be retaining fluid if you have a history of heart failure or if you experience any of the following symptoms:  Weight gain of 3 pounds or more overnight or 5 pounds in a week, increased swelling in our hands or feet or shortness of breath while lying flat in bed. Please call your doctor as soon as you notice any of these symptoms; do not wait until your next office visit. The discharge information has been reviewed with the patient. The patient verbalized understanding. Discharge medications reviewed with the patient and appropriate educational materials and side effects teaching were provided. ___________________________________________________________________________________________________________________________________        Discharge Medication List and Instructions:   Discharge Medication List as of 9/29/2020  3:00 PM      START taking these medications    Details   gabapentin (NEURONTIN) 300 mg capsule Take 1 Cap by mouth three (3) times daily. Max Daily Amount: 900 mg. Indications: neuropathic pain, Normal, Disp-90 Cap,R-0         CONTINUE these medications which have CHANGED    Details   lurasidone (LATUDA) 80 mg tab tablet Take 2 Tabs by mouth daily (with dinner) for 30 days. Indications: bipolar depression, Normal, Disp-60 Tab,R-0      sertraline (ZOLOFT) 50 mg tablet Take 1 Tab by mouth daily.  Indications: anxiousness associated with depression, Normal, Disp-30 Tab,R-0         CONTINUE these medications which have NOT CHANGED    Details   valsartan (DIOVAN) 160 mg tablet Take 1 Tab by mouth daily. Indications: high blood pressure, Normal, Disp-14 Tab,R-0      metFORMIN (GLUCOPHAGE) 1,000 mg tablet Take 1 Tab by mouth two (2) times daily (with meals). Indications: type 2 diabetes mellitus, Normal, Disp-180 Tab,R-3Jail pt      atorvastatin (LIPITOR) 10 mg tablet Take 1 Tab by mouth daily. Indications: prevent stroke, Normal, Disp-90 Tab, R-3         STOP taking these medications       diclofenac (VOLTAREN) 1 % gel Comments:   Reason for Stopping:               Unresulted Labs (24h ago, onward)    None        To obtain results of studies pending at discharge, please contact [phone]    Follow-up Information     Follow up With Specialties Details Why Contact Info    [facility]     [address]   [place], [address]  Office Number: [phone] ext [phone] [facility])  Fax: [phone]    [facility]   If you need shelter in the [place] area, contact the [address]: [phone]. Those in crisis can contact [facility] to discuss needs and to be connected with resources. The Center is open Monday-Friday 8:30am-5pm. [address] [place] [address], [address]  [address]   Call [phone] and ask to complete an intake to be linked with mental health services for psychitary and substance abuse. [address]  [phone]      [facility]   arrive by 7am, when doors open at 7:30am register for intake to be linked with medical and mental health services.   [facility]  [address] [facility]  [phone]    Nancy Staley, [address]   [address] [place] [address]  [phone]            Advanced Directive:   Does the patient have an appointed surrogate decision maker? No  Does the patient have a Medical Advance Directive? No  Does the patient have a Psychiatric Advance Directive? No  If the patient does not have a surrogate or Medical Advance Directive AND Psychiatric Advance Directive, the patient was offered information on these advance directives Patient declined to complete    Patient Instructions: Please continue all medications until otherwise directed by physician. Tobacco Cessation Discharge Plan:   Is the patient a smoker and needs referral for smoking cessation? No  Patient referred to the following for smoking cessation with an appointment? No     Patient was offered medication to assist with smoking cessation at discharge? No  Was education for smoking cessation added to the discharge instructions? Yes    Alcohol/Substance Abuse Discharge Plan:   Does the patient have a history of substance/alcohol abuse and requires a referral for treatment? Yes  Patient referred to the following for substance/alcohol abuse treatment with an appointment? Yes  Patient was offered medication to assist with alcohol cessation at discharge? No  Was education for substance/alcohol abuse added to discharge instructions? No    Patient discharged to Home; discussed with patient/caregiver and provided to the patient/caregiver either in hard copy or electronically.

## 2020-10-06 ENCOUNTER — HOSPITAL ENCOUNTER (INPATIENT)
Age: 60
LOS: 6 days | Discharge: HOME | DRG: 885 | End: 2020-10-12
Attending: EMERGENCY MEDICINE | Admitting: PSYCHIATRY & NEUROLOGY
Payer: MEDICARE

## 2020-10-06 DIAGNOSIS — R45.850 HOMICIDAL IDEATION: ICD-10-CM

## 2020-10-06 DIAGNOSIS — F10.10 ALCOHOL ABUSE: ICD-10-CM

## 2020-10-06 DIAGNOSIS — R45.851 SUICIDAL IDEATIONS: Primary | ICD-10-CM

## 2020-10-06 PROBLEM — F20.9 SCHIZOPHRENIA (HCC): Status: ACTIVE | Noted: 2020-10-06

## 2020-10-06 LAB
ALBUMIN SERPL-MCNC: 4.3 G/DL (ref 3.5–5)
ALBUMIN/GLOB SERPL: 1.1 {RATIO} (ref 1.1–2.2)
ALP SERPL-CCNC: 66 U/L (ref 45–117)
ALT SERPL-CCNC: 58 U/L (ref 12–78)
AMPHET UR QL SCN: NEGATIVE
ANION GAP SERPL CALC-SCNC: 12 MMOL/L (ref 5–15)
APAP SERPL-MCNC: <2 UG/ML (ref 10–30)
APPEARANCE UR: CLEAR
AST SERPL-CCNC: 102 U/L (ref 15–37)
ATRIAL RATE: 87 BPM
BACTERIA URNS QL MICRO: NEGATIVE /HPF
BARBITURATES UR QL SCN: NEGATIVE
BASOPHILS # BLD: 0 K/UL (ref 0–0.1)
BASOPHILS NFR BLD: 0 % (ref 0–1)
BENZODIAZ UR QL: NEGATIVE
BILIRUB SERPL-MCNC: 0.4 MG/DL (ref 0.2–1)
BILIRUB UR QL: NEGATIVE
BUN SERPL-MCNC: 27 MG/DL (ref 6–20)
BUN/CREAT SERPL: 21 (ref 12–20)
CALCIUM SERPL-MCNC: 9.7 MG/DL (ref 8.5–10.1)
CALCULATED P AXIS, ECG09: 64 DEGREES
CALCULATED R AXIS, ECG10: -83 DEGREES
CALCULATED T AXIS, ECG11: 52 DEGREES
CANNABINOIDS UR QL SCN: NEGATIVE
CHLORIDE SERPL-SCNC: 98 MMOL/L (ref 97–108)
CO2 SERPL-SCNC: 25 MMOL/L (ref 21–32)
COCAINE UR QL SCN: POSITIVE
COLOR UR: ABNORMAL
COMMENT, HOLDF: NORMAL
COVID-19 RAPID TEST, COVR: NOT DETECTED
COVID-19 RAPID TEST, COVR: NOT DETECTED
CREAT SERPL-MCNC: 1.3 MG/DL (ref 0.7–1.3)
DIAGNOSIS, 93000: NORMAL
DIFFERENTIAL METHOD BLD: NORMAL
DRUG SCRN COMMENT,DRGCM: ABNORMAL
EOSINOPHIL # BLD: 0 K/UL (ref 0–0.4)
EOSINOPHIL NFR BLD: 0 % (ref 0–7)
EPITH CASTS URNS QL MICRO: ABNORMAL /LPF
ERYTHROCYTE [DISTWIDTH] IN BLOOD BY AUTOMATED COUNT: 13.3 % (ref 11.5–14.5)
ETHANOL SERPL-MCNC: 162 MG/DL
GLOBULIN SER CALC-MCNC: 3.8 G/DL (ref 2–4)
GLUCOSE SERPL-MCNC: 129 MG/DL (ref 65–100)
GLUCOSE UR STRIP.AUTO-MCNC: NEGATIVE MG/DL
HCT VFR BLD AUTO: 40.2 % (ref 36.6–50.3)
HEALTH STATUS, XMCV2T: NORMAL
HGB BLD-MCNC: 13.6 G/DL (ref 12.1–17)
HGB UR QL STRIP: ABNORMAL
IMM GRANULOCYTES # BLD AUTO: 0 K/UL (ref 0–0.04)
IMM GRANULOCYTES NFR BLD AUTO: 0 % (ref 0–0.5)
KETONES UR QL STRIP.AUTO: NEGATIVE MG/DL
LEUKOCYTE ESTERASE UR QL STRIP.AUTO: NEGATIVE
LIPASE SERPL-CCNC: 126 U/L (ref 73–393)
LYMPHOCYTES # BLD: 3 K/UL (ref 0.8–3.5)
LYMPHOCYTES NFR BLD: 38 % (ref 12–49)
MCH RBC QN AUTO: 30.6 PG (ref 26–34)
MCHC RBC AUTO-ENTMCNC: 33.8 G/DL (ref 30–36.5)
MCV RBC AUTO: 90.3 FL (ref 80–99)
METHADONE UR QL: NEGATIVE
MONOCYTES # BLD: 0.7 K/UL (ref 0–1)
MONOCYTES NFR BLD: 8 % (ref 5–13)
NEUTS SEG # BLD: 4.3 K/UL (ref 1.8–8)
NEUTS SEG NFR BLD: 54 % (ref 32–75)
NITRITE UR QL STRIP.AUTO: NEGATIVE
NRBC # BLD: 0 K/UL (ref 0–0.01)
NRBC BLD-RTO: 0 PER 100 WBC
OPIATES UR QL: NEGATIVE
P-R INTERVAL, ECG05: 150 MS
PCP UR QL: NEGATIVE
PH UR STRIP: 5 [PH] (ref 5–8)
PLATELET # BLD AUTO: 213 K/UL (ref 150–400)
PMV BLD AUTO: 10 FL (ref 8.9–12.9)
POTASSIUM SERPL-SCNC: 2.9 MMOL/L (ref 3.5–5.1)
PROT SERPL-MCNC: 8.1 G/DL (ref 6.4–8.2)
PROT UR STRIP-MCNC: NEGATIVE MG/DL
Q-T INTERVAL, ECG07: 414 MS
QRS DURATION, ECG06: 128 MS
QTC CALCULATION (BEZET), ECG08: 498 MS
RBC # BLD AUTO: 4.45 M/UL (ref 4.1–5.7)
RBC #/AREA URNS HPF: ABNORMAL /HPF (ref 0–5)
SALICYLATES SERPL-MCNC: 3.8 MG/DL (ref 2.8–20)
SAMPLES BEING HELD,HOLD: NORMAL
SARS-COV-2, COV2: ABNORMAL
SARS-COV-2, COV2: NOT DETECTED
SODIUM SERPL-SCNC: 135 MMOL/L (ref 136–145)
SOURCE, COVRS: NORMAL
SOURCE, COVRS: NORMAL
SP GR UR REFRACTOMETRY: >1.03 (ref 1–1.03)
SPECIMEN SOURCE, FCOV2M: ABNORMAL
SPECIMEN SOURCE, FCOV2M: NORMAL
SPECIMEN TYPE, XMCV1T: NORMAL
SPECIMEN TYPE, XMCV1T: NORMAL
UROBILINOGEN UR QL STRIP.AUTO: 0.2 EU/DL (ref 0.2–1)
VENTRICULAR RATE, ECG03: 87 BPM
WBC # BLD AUTO: 8 K/UL (ref 4.1–11.1)
WBC URNS QL MICRO: ABNORMAL /HPF (ref 0–4)

## 2020-10-06 PROCEDURE — 65220000003 HC RM SEMIPRIVATE PSYCH

## 2020-10-06 PROCEDURE — 99284 EMERGENCY DEPT VISIT MOD MDM: CPT

## 2020-10-06 PROCEDURE — 87635 SARS-COV-2 COVID-19 AMP PRB: CPT

## 2020-10-06 PROCEDURE — 80053 COMPREHEN METABOLIC PANEL: CPT

## 2020-10-06 PROCEDURE — 80307 DRUG TEST PRSMV CHEM ANLYZR: CPT

## 2020-10-06 PROCEDURE — 36415 COLL VENOUS BLD VENIPUNCTURE: CPT

## 2020-10-06 PROCEDURE — 83690 ASSAY OF LIPASE: CPT

## 2020-10-06 PROCEDURE — 81001 URINALYSIS AUTO W/SCOPE: CPT

## 2020-10-06 PROCEDURE — 93005 ELECTROCARDIOGRAM TRACING: CPT

## 2020-10-06 PROCEDURE — 85025 COMPLETE CBC W/AUTO DIFF WBC: CPT

## 2020-10-06 PROCEDURE — 74011250637 HC RX REV CODE- 250/637: Performed by: EMERGENCY MEDICINE

## 2020-10-06 RX ORDER — ADHESIVE BANDAGE
30 BANDAGE TOPICAL DAILY PRN
Status: DISCONTINUED | OUTPATIENT
Start: 2020-10-06 | End: 2020-10-12 | Stop reason: HOSPADM

## 2020-10-06 RX ORDER — HALOPERIDOL 5 MG/ML
5 INJECTION INTRAMUSCULAR
Status: DISCONTINUED | OUTPATIENT
Start: 2020-10-06 | End: 2020-10-12 | Stop reason: HOSPADM

## 2020-10-06 RX ORDER — HYDROXYZINE 50 MG/1
50 TABLET, FILM COATED ORAL
Status: DISCONTINUED | OUTPATIENT
Start: 2020-10-06 | End: 2020-10-12 | Stop reason: HOSPADM

## 2020-10-06 RX ORDER — OLANZAPINE 5 MG/1
5 TABLET ORAL
Status: DISCONTINUED | OUTPATIENT
Start: 2020-10-06 | End: 2020-10-12 | Stop reason: HOSPADM

## 2020-10-06 RX ORDER — BENZTROPINE MESYLATE 1 MG/1
1 TABLET ORAL
Status: DISCONTINUED | OUTPATIENT
Start: 2020-10-06 | End: 2020-10-12 | Stop reason: HOSPADM

## 2020-10-06 RX ORDER — DIPHENHYDRAMINE HYDROCHLORIDE 50 MG/ML
50 INJECTION, SOLUTION INTRAMUSCULAR; INTRAVENOUS
Status: DISCONTINUED | OUTPATIENT
Start: 2020-10-06 | End: 2020-10-12 | Stop reason: HOSPADM

## 2020-10-06 RX ORDER — LORAZEPAM 2 MG/ML
1 INJECTION INTRAMUSCULAR
Status: DISCONTINUED | OUTPATIENT
Start: 2020-10-06 | End: 2020-10-12 | Stop reason: HOSPADM

## 2020-10-06 RX ORDER — ACETAMINOPHEN 325 MG/1
650 TABLET ORAL
Status: DISCONTINUED | OUTPATIENT
Start: 2020-10-06 | End: 2020-10-12 | Stop reason: HOSPADM

## 2020-10-06 RX ORDER — POTASSIUM CHLORIDE 750 MG/1
40 TABLET, FILM COATED, EXTENDED RELEASE ORAL
Status: COMPLETED | OUTPATIENT
Start: 2020-10-06 | End: 2020-10-06

## 2020-10-06 RX ORDER — TRAZODONE HYDROCHLORIDE 50 MG/1
50 TABLET ORAL
Status: DISCONTINUED | OUTPATIENT
Start: 2020-10-06 | End: 2020-10-12 | Stop reason: HOSPADM

## 2020-10-06 RX ADMIN — POTASSIUM CHLORIDE 40 MEQ: 750 TABLET, FILM COATED, EXTENDED RELEASE ORAL at 09:28
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
## 2020-10-06 NOTE — ED NOTES
EKG 0636: Rate 87, normal sinus rhythm, RBBB, LAFB, PAC's noted. No significant change from last tracing. Patient able to take po, potassium ordered for repletion. Awaiting placement with psych. MEDICALLY CLEAR FOR TRANSFER OR PSYCHIATRIC ADMISSION.

## 2020-10-06 NOTE — ED TRIAGE NOTES
Arrives via ems reporting SI/Hi and visual/auditory hallucinations. Patient unable to contract to safety. \"I need help, I'm gonna hurt someone. \"    Patient able to be visualized from nurses station, all cords removed from room. Patient belonging secured outside nurses station.

## 2020-10-06 NOTE — PROGRESS NOTES
Problem: Suicide  Goal: *STG: Remains safe in hospital  Outcome: Resolved/Met  Goal: *STG: Seeks staff when feelings of self harm or harm towards others arise  Outcome: Resolved/Met  Goal: *STG: Attends activities and groups  Outcome: Resolved/Met  Goal: *STG:  Verbalizes alternative ways of dealing with maladaptive feelings/behaviors  Outcome: Resolved/Met  Goal: *STG/LTG: Complies with medication therapy  Outcome: Resolved/Met  Goal: *STG/LTG: No longer expresses self destructive or suicidal thoughts  Outcome: Resolved/Met  Goal: Interventions  Outcome: Resolved/Met     Problem: Patient Education: Go to Patient Education Activity  Goal: Patient/Family Education  Outcome: Resolved/Met

## 2020-10-06 NOTE — ED NOTES
Pt resting on ED stretcher. Pt calm but not cooperative with RNs assessment- seems unwilling to answer orientation questions. Pt updated on plan of care and denies needs at this time.

## 2020-10-06 NOTE — PROGRESS NOTES
Asked to review EKG for cardiac clearance. He has sinus rhythm with RBBB and LAFB (bifascicular block)    He is here with non-cardiac symptoms.   The EKG is not concerning for any acute cardiac process and he is cleared to placed in any bed type deemed appropriate by the ER and behavioral health team.

## 2020-10-06 NOTE — ED NOTES
Gave bedside report regarding, SBAR, MAR, and plan of care to [place], [facility]. Transfered care of patient to RN.

## 2020-10-06 NOTE — ROUTINE PROCESS
TRANSFER - OUT REPORT: 
 
Verbal report given to Staff RN (name) on Wolfgang Mckeon  being transferred to [facility] (unit) for routine progression of care Report consisted of patients Situation, Background, Assessment and  
Recommendations(SBAR). Information from the following report(s) SBAR, ED Summary, MAR, Recent Results and Cardiac Rhythm NSR with BBB  was reviewed with the receiving nurse. Lines:    
None Opportunity for questions and clarification was provided. Patient transported with: 
 Registered Nurse [employer] officer Belongings
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
## 2020-10-06 NOTE — PROGRESS NOTES
Admission Medication Reconciliation: In progress:    Chart notes and RX Query were used to update medication list.  Elzbieta Bloom shows last fill of medications was in April 2020, although it is worth nothing that patient could have samples given to him or other unknown prescription source. Chart notes state that patient describes himself as compliant with his medication regimen. No changes made to list.    Notes:  ETOH: History of heavy drinking per chart  Other illicit drug use: History of cocaine abuse per chart    Recommendations:  Monitor for ETOH  withdrawal, implement CIWA and alcohol withdrawal order set if withdrawal is suspected    Thank you for allowing me to participate in the care of your patient. Valentín Hernandez PharmD, RN # [phone]       [facility] pharmacy benefit data reflects medications filled and processed through the patient's insurance, however   this data does NOT capture whether the medication was picked up or is currently being taken by the patient. Allergies:  Tramadol and Lisinopril    Significant PMH/Disease States:   Past Medical History:   Diagnosis Date    Alcohol abuse     Chronic pain     Cocaine abuse ([facility]     Diabetes ([facility]     Hepatitis C     Hypertension     Psychiatric disorder     Depression & Schizoaffective Disorder     Chief Complaint for this Admission:    Chief Complaint   Patient presents with    Mental Health Problem     Prior to Admission Medications:   Prior to Admission Medications   Prescriptions Last Dose Informant Taking?   atorvastatin (LIPITOR) 10 mg tablet  Self No   Sig: Take 1 Tab by mouth daily. Indications: prevent stroke   gabapentin (NEURONTIN) 300 mg capsule   No   Sig: Take 1 Cap by mouth three (3) times daily. Max Daily Amount: 900 mg. Indications: neuropathic pain   lurasidone (LATUDA) 80 mg tab tablet   No   Sig: Take 2 Tabs by mouth daily (with dinner) for 30 days.  Indications: bipolar depression   metFORMIN (GLUCOPHAGE) 1,000 mg tablet No   Sig: Take 1 Tab by mouth two (2) times daily (with meals). Indications: type 2 diabetes mellitus   sertraline (ZOLOFT) 50 mg tablet   No   Sig: Take 1 Tab by mouth daily. Indications: anxiousness associated with depression   valsartan (DIOVAN) 160 mg tablet   No   Sig: Take 1 Tab by mouth daily. Indications: high blood pressure      Facility-Administered Medications: None       Please contact the main inpatient pharmacy with any questions or concerns at [phone] and we will direct you to the clinical pharmacist covering this patient's care while in-house.    JUANY Singh
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
## 2020-10-06 NOTE — BSMART NOTE
This writer spoke with Access and it was relayed that patient was declined by Dr Kedar Powell due to low potassium and EKG. Will relay to ER physician. Spoke with Dr Beth Carlisle who reviewed EKG and stated no change from previous EKG. Dr Beth Carlisle order potassium and stated no medical need for admission. Plan is to see if a bed opens up at [facility] and review for that once potassium is given. Update:  Dr Kedar Powell requested to see if cardiology could be consulted and clear patient and Dr Kedar Powell may consider for [facility] if this is done. Cardiology cleared patient and bed obtained at [facility] but then PCR came back indeterminate so another swab was sent and this is pending still.

## 2020-10-06 NOTE — ED NOTES
Per Dr Emely Garcia and [facility] pt has been medically cleared.

## 2020-10-07 PROCEDURE — 93005 ELECTROCARDIOGRAM TRACING: CPT

## 2020-10-07 PROCEDURE — 74011250637 HC RX REV CODE- 250/637: Performed by: PSYCHIATRY & NEUROLOGY

## 2020-10-07 PROCEDURE — 65220000003 HC RM SEMIPRIVATE PSYCH

## 2020-10-07 RX ORDER — GABAPENTIN 300 MG/1
300 CAPSULE ORAL 3 TIMES DAILY
Status: DISCONTINUED | OUTPATIENT
Start: 2020-10-07 | End: 2020-10-12 | Stop reason: HOSPADM

## 2020-10-07 RX ORDER — ATORVASTATIN CALCIUM 10 MG/1
10 TABLET, FILM COATED ORAL DAILY
Status: DISCONTINUED | OUTPATIENT
Start: 2020-10-08 | End: 2020-10-12 | Stop reason: HOSPADM

## 2020-10-07 RX ORDER — METFORMIN HYDROCHLORIDE 500 MG/1
1000 TABLET ORAL 2 TIMES DAILY WITH MEALS
Status: DISCONTINUED | OUTPATIENT
Start: 2020-10-07 | End: 2020-10-10

## 2020-10-07 RX ORDER — SERTRALINE HYDROCHLORIDE 50 MG/1
50 TABLET, FILM COATED ORAL DAILY
Status: DISCONTINUED | OUTPATIENT
Start: 2020-10-07 | End: 2020-10-09

## 2020-10-07 RX ORDER — LOSARTAN POTASSIUM 50 MG/1
100 TABLET ORAL DAILY
Status: DISCONTINUED | OUTPATIENT
Start: 2020-10-08 | End: 2020-10-10

## 2020-10-07 RX ADMIN — METFORMIN HYDROCHLORIDE 1000 MG: 500 TABLET ORAL at 17:10

## 2020-10-07 RX ADMIN — GABAPENTIN 300 MG: 300 CAPSULE ORAL at 17:09

## 2020-10-07 RX ADMIN — LURASIDONE HYDROCHLORIDE 80 MG: 40 TABLET, FILM COATED ORAL at 17:08

## 2020-10-07 RX ADMIN — SERTRALINE HYDROCHLORIDE 50 MG: 50 TABLET ORAL at 12:32

## 2020-10-07 NOTE — PROGRESS NOTES
Problem: Depressed Mood (Adult/Pediatric)  Goal: *STG: Participates in treatment plan  Outcome: Progressing Towards Goal  Goal: *STG: Attends activities and groups  Outcome: Progressing Towards Goal  Goal: *STG: Complies with medication therapy  Outcome: Progressing Towards Goal

## 2020-10-07 NOTE — INTERDISCIPLINARY ROUNDS
Behavioral Health Interdisciplinary Rounds Patient Name: Beth Ritchie  Age: 62 y.o. Room/Bed:  726/ Primary Diagnosis: <principal problem not specified> Admission Status: Voluntary Readmission within 30 days: no 
Power of  in place: no 
Patient requires a blocked bed: no          Reason for blocked bed: VTE Prophylaxis: No 
 
Mobility needs/Fall risk: no 
Flu Vaccine :  
Nutritional Plan: no 
Consults:         
Labs/Testing due today?: no 
 
Sleep hours: 9 Participation in Care/Groups:  New admission Medication Compliant?:  New admission: no scheduled medications PRNS (last 24 hours): None Restraints (last 24 hours):  no 
  
CIWA (range last 24 hours): COWS (range last 24 hours): Alcohol screening (AUDIT) completed -   AUDIT Score: 0 If applicable, date SBIRT discussed in treatment team AND documented:  
AUDIT Screen Score: AUDIT Score: 0 Tobacco - patient is a smoker:   
Illegal Drugs use:   yes drinking daily and using cocaine 24 hour chart check complete:  
 
Patient goal(s) for today:  
Treatment team focus/goals: Plan to assess for medications and discharge needs. Progress note : He stated he was suicidal today, unable to participate in treatment team.   
 
LOS:  1  Expected LOS: TBD Financial concerns/prescription coverage : medicare Family contact:   
Bro Ruth  Brother  [phone] Family requesting physician contact today:  Medicare Discharge plan:he is homeless Access to weapons No     
Outpatient provider(s): [facility] Patient's preferred phone number for follow up call :  
 
Participating treatment team members: Kati Durham Dr. , RN - Kira Loredo.

## 2020-10-07 NOTE — BH NOTES
GROUP THERAPY PROGRESS NOTE    Patient did not participate in Process Group.      Sam Bain, Supervisee in Social Work

## 2020-10-07 NOTE — BH NOTES
At [facility]:  Charge Nurse advised patient willing to take medications now. Scanned 1700/1800 meds. At 1710:  Patient refused all 1700 medications. Patient was asked twice and refused twice.
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
## 2020-10-07 NOTE — PROGRESS NOTES
Problem: Discharge Planning  Goal: *Discharge to safe environment  Outcome: Progressing Towards Goal  Goal: *Knowledge of medication management  Outcome: Progressing Towards Goal  Goal: *Knowledge of discharge instructions  Outcome: Progressing Towards Goal     Problem: Patient Education: Go to Patient Education Activity  Goal: Patient/Family Education  Outcome: Progressing Towards Goal

## 2020-10-07 NOTE — H&P
[address] [facility] HISTORY AND PHYSICAL    Name:  Sanjuanita Light  MR#:  [id number]  :  1960  ACCOUNT #:  [de-identified]  ADMIT DATE:  10/06/2020      INITIAL PSYCHIATRIC INTERVIEW    CHIEF COMPLAINT:  \"I feel terrible. \"    HISTORY OF PRESENT ILLNESS:  The patient is a 62-year-old [facility] American man who is well known to me from prior hospitalizations at [facility].  He was discharged from my care on 2020, and was brought in by emergency medical services to the ED yesterday. He is a poor historian and sat there throughout the whole process and answered just a few of my questions. He keeps talking to himself periodically during the interview and was unable to provide much history. According to the ER notes, he came into the ER and was intoxicated with a blood alcohol level of 162. He had reported using three 24-ounce beers and also had reported using cocaine. Urine drug screen was positive for this. Says that he is having thoughts of homicide towards whoever god wants him to kill and also has thoughts of wanting to end his life and the voices keep telling him to do this. He refused to elaborate any further. He has a long history of malingering symptoms and staff note that his symptoms appear to be very dramatic and exaggerated at times. He did not answer questions about compliance with his medications and does not remember what he did with them. PAST MEDICAL HISTORY:  Reviewed as per the history and physical exam.      Past Medical History:   Diagnosis Date    Alcohol abuse     Chronic pain     Cocaine abuse ([facility]     Diabetes ([facility]     Hepatitis C     Hypertension     Psychiatric disorder     Depression & Schizoaffective Disorder     Prior to Admission medications    Medication Sig Start Date End Date Taking? Authorizing Provider   lurasidone (LATUDA) 80 mg tab tablet Take 2 Tabs by mouth daily (with dinner) for 30 days.  Indications: bipolar depression 9/29/20 10/29/20  Bev Aguilar MD   sertraline (ZOLOFT) 50 mg tablet Take 1 Tab by mouth daily. Indications: anxiousness associated with depression 9/30/20   Bev Aguilar MD   gabapentin (NEURONTIN) 300 mg capsule Take 1 Cap by mouth three (3) times daily. Max Daily Amount: 900 mg. Indications: neuropathic pain 9/29/20   Bev Aguilar MD   valsartan (DIOVAN) 160 mg tablet Take 1 Tab by mouth daily. Indications: high blood pressure 8/11/20   Thelma Zhang NP   metFORMIN (GLUCOPHAGE) 1,000 mg tablet Take 1 Tab by mouth two (2) times daily (with meals). Indications: type 2 diabetes mellitus 7/2/20   Clover Louis MD   atorvastatin (LIPITOR) 10 mg tablet Take 1 Tab by mouth daily. Indications: prevent stroke 5/22/20   Clover Louis MD     Vitals:    10/07/20 1046 10/07/20 1321 10/07/20 2002 10/08/20 0852   BP: 124/78  119/77 (!) 154/79   Pulse: 79  78 88   Resp: 14  16 16   Temp: 98.3 °F (36.8 °C)  98.5 °F (36.9 °C) 98.3 °F (36.8 °C)   SpO2: 96%  97% 97%   Weight:  83.3 kg (183 lb 10.3 oz)     Height:  6' (1.829 m)       Lab Results   Component Value Date/Time    WBC 8.0 10/06/2020 04:20 AM    HGB 13.6 10/06/2020 04:20 AM    HCT 40.2 10/06/2020 04:20 AM    PLATELET 411 24/84/8722 04:20 AM    MCV 90.3 10/06/2020 04:20 AM     Lab Results   Component Value Date/Time    Sodium 135 (L) 10/06/2020 04:20 AM    Potassium 2.9 (L) 10/06/2020 04:20 AM    Chloride 98 10/06/2020 04:20 AM    CO2 25 10/06/2020 04:20 AM    Anion gap 12 10/06/2020 04:20 AM    Glucose 129 (H) 10/06/2020 04:20 AM    Glucose 102 (H) 09/26/2020 06:18 AM    BUN 27 (H) 10/06/2020 04:20 AM    Creatinine 1.30 10/06/2020 04:20 AM    BUN/Creatinine ratio 21 (H) 10/06/2020 04:20 AM    GFR est AA >60 10/06/2020 04:20 AM    GFR est non-AA 57 (L) 10/06/2020 04:20 AM    Calcium 9.7 10/06/2020 04:20 AM    Bilirubin, total 0.4 10/06/2020 04:20 AM    Alk.  phosphatase 66 10/06/2020 04:20 AM    Protein, total 8.1 10/06/2020 04:20 AM    Albumin 4.3 10/06/2020 04:20 AM    Globulin 3.8 10/06/2020 04:20 AM    A-G Ratio 1.1 10/06/2020 04:20 AM    ALT (SGPT) 58 10/06/2020 04:20 AM    AST (SGOT) 102 (H) 10/06/2020 04:20 AM     No results found for: VALF2, VALAC, VALP, VALPR, DS6, CRBAM, CRBAMP, CARB2, XCRBAM  No results found for: [facility]  RADIOLOGY REPORTS:(reviewed/updated 10/8/2020)  Xr Chest Port    Result Date: 8/4/2020  EXAM: XR CHEST PORT INDICATION: Chest Pain COMPARISON: 7/29/2015 through 12/1/2014. FINDINGS: A portable AP radiograph of the chest was obtained at 17:12 hours. The patient is on a cardiac monitor. The lungs are clear. The cardiac and mediastinal contours and pulmonary vascularity are normal.  The chest wall structures and visualized upper abdomen show no acute findings with incidental note of degenerative spine and shoulder changes. IMPRESSION: No acute findings. PAST PSYCHIATRIC HISTORY:  The patient was unable to provide much history, and according to my previous notes, he has had multiple prior psychiatric hospitalizations. He has had 3 recent hospitalizations at [facility] and was discharged from my care on 09/29/2020. Also, was at [facility] recently. He has a long history of cocaine and alcohol abuse. PSYCHOSOCIAL HISTORY:  The patient is currently homeless and has been for several months now. He has 3 children who are grown and he does not keep in touch with anyone of them. States that he has never been  and gets social security disability income. The patient is a  who served in the [employer] from [phone] and got a medical discharge for his psychiatric condition. Did not answer questions about current legal or financial stressors. MENTAL STATUS EXAM:  The patient is a middle-aged [facility] American man who is dressed in hospital apparel. He was calm and cooperative during the interview, but made little or no eye contact. His psychomotor activity is decreased.   He sat with his eyes downcast and periodically kept talking to himself, but most of what he said was unintelligible. Speech is spontaneous and coherent but speaks very softly and can be difficult to follow. Mood is reported as being down and affect is blunted. Passive suicidal ideation plan is present and he had thoughts of taking an overdose. Command auditory hallucinations are present and urge him to end his life. Also has homicidal ideation, although this is very vague and says he can kill whoever god wants him to. Denies any delusions. Thought process is mildly disorganized. Cognitively, he is awake and alert, oriented to time, place and situation. He was unwilling to cooperate for cognitive testing and a detailed evaluation was not possible. Insight is poor. Judgment is poor. ASSESSMENT AND PLAN/DIAGNOSES:  Schizoaffective disorder bipolar type, cocaine use disorder, severe, alcohol use disorder, severe. I will continue his inpatient stay. He does not appear to need detox as he only drank for 1 day. We will observe him for development of withdrawal symptoms. His strengths include his ability to seek help and support from his family. Estimated length of stay is 5-7 days.         MD PASTORA [id number]  D:  10/07/2020 15:25  T:  10/07/2020 16:12  JOB #:  [id number]

## 2020-10-07 NOTE — PROGRESS NOTES
Pt received resting quietly in bed with eyes closed. Respirations equal and unlabored. No acute distress noted. Will continue to monitor through q15 minute safety rounds. Problem: Falls - Risk of  Goal: *Absence of Falls  Description: Document Albino Messing Fall Risk and appropriate interventions in the flowsheet.   Outcome: Progressing Towards Goal  Note: Fall Risk Interventions:            Medication Interventions: Teach patient to arise slowly

## 2020-10-07 NOTE — BH NOTES
GROUP THERAPY PROGRESS NOTE    Patient did not participate in Coping Skills group.      Sam Bain, Supervisee in Social Work

## 2020-10-07 NOTE — PROGRESS NOTES
Problem: Depressed Mood (Adult/Pediatric)  Goal: *STG: Verbalizes anger, guilt, and other feelings in a constructive manor  Outcome: Progressing Towards Goal  Goal: *STG: Attends activities and groups  Outcome: Not Progressing Towards Goal  Goal: *STG: Remains safe in hospital  Outcome: Progressing Towards Goal  Goal: *STG: Complies with medication therapy  Outcome: Progressing Towards Goal  Goal: Interventions  Outcome: Progressing Towards Goal

## 2020-10-08 PROCEDURE — 65220000003 HC RM SEMIPRIVATE PSYCH

## 2020-10-08 PROCEDURE — 74011250637 HC RX REV CODE- 250/637: Performed by: PSYCHIATRY & NEUROLOGY

## 2020-10-08 PROCEDURE — 74011250637 HC RX REV CODE- 250/637: Performed by: NURSE PRACTITIONER

## 2020-10-08 RX ADMIN — ATORVASTATIN CALCIUM 10 MG: 10 TABLET, FILM COATED ORAL at 08:24

## 2020-10-08 RX ADMIN — METFORMIN HYDROCHLORIDE 1000 MG: 500 TABLET ORAL at 08:24

## 2020-10-08 RX ADMIN — LOSARTAN POTASSIUM 100 MG: 50 TABLET, FILM COATED ORAL at 08:24

## 2020-10-08 RX ADMIN — GABAPENTIN 300 MG: 300 CAPSULE ORAL at 08:24

## 2020-10-08 RX ADMIN — GABAPENTIN 300 MG: 300 CAPSULE ORAL at 16:26

## 2020-10-08 RX ADMIN — ARIPIPRAZOLE 15 MG: 10 TABLET ORAL at 16:26

## 2020-10-08 RX ADMIN — GABAPENTIN 300 MG: 300 CAPSULE ORAL at 21:13

## 2020-10-08 RX ADMIN — METFORMIN HYDROCHLORIDE 1000 MG: 500 TABLET ORAL at 16:26

## 2020-10-08 RX ADMIN — SERTRALINE HYDROCHLORIDE 50 MG: 50 TABLET ORAL at 08:24

## 2020-10-08 RX ADMIN — OLANZAPINE 5 MG: 5 TABLET, FILM COATED ORAL at 08:29

## 2020-10-08 NOTE — PROGRESS NOTES
Problem: Depressed Mood (Adult/Pediatric)  Goal: *STG: Complies with medication therapy  Outcome: Progressing Towards Goal     Problem: Falls - Risk of  Goal: *Absence of Falls  Description: Document Cortez Fall Risk and appropriate interventions in the flowsheet. Outcome: Progressing Towards Goal  Note: Fall Risk Interventions:            Medication Interventions: Teach patient to arise slowly    Patient in bed for most of evening shift. Med compliant, pleasant with staff. No issues or concerns. Will continue to monitor for safety Q15 minutes.

## 2020-10-08 NOTE — PROGRESS NOTES
Problem: Depressed Mood (Adult/Pediatric)  Goal: *STG: Participates in treatment plan  Outcome: Progressing Towards Goal  Goal: *STG: Attends activities and groups  Outcome: Progressing Towards Goal  Goal: *STG: Complies with medication therapy  Outcome: Progressing Towards Goal     Problem: Patient Education: Go to Patient Education Activity  Goal: Patient/Family Education  Outcome: Progressing Towards Goal     [facility]  Master Treatment Plan for Myron Aquino    Date Treatment Plan Initiated: 10/07/2020    Treatment Plan Modalities:  Type of Modality Amount  (x minutes) Frequency (x/week) Duration (x days) Name of Responsible [address] [place] [address] Pkwy meetings to encourage peer interactions 15 7 1      Group psychotherapy to assist in building coping skills and internal controls 60 7 1 Sam Bain   Therapeutic activity groups to build coping skills 60 7 1 Sam Bain   Psychoeducation in group setting to address:   Medication education   [address] PharmD   Coping skills      Luzmaria Callejas RN   Relaxation techniques      Luzmaria Callejas RN   Symptom management         Discharge planning   [address] 2 1 Chaplain LOBATO   61 1 1 volunteer   Recovery/AA/NA      volunteer   Physician medication management   13 7 1 Dr. Espinal Mask   15 2 [facility] and [facility]

## 2020-10-08 NOTE — PROGRESS NOTES
Pt received resting quietly in bed with eyes closed. No acute distress noted. Respirations equal and unlabored. Will continue to monitor throughout shift q15 minute safety rounds. Problem: Depressed Mood (Adult/Pediatric)  Goal: *STG: Remains safe in hospital  Outcome: Progressing Towards Goal     Problem: Falls - Risk of  Goal: *Absence of Falls  Description: Document Cortez Fall Risk and appropriate interventions in the flowsheet.   Outcome: Progressing Towards Goal  Note: Fall Risk Interventions:            Medication Interventions: Teach patient to arise slowly

## 2020-10-08 NOTE — BH NOTES
Information for Homeless Point of Entry   [facility] Al[facility] partners with homeless services providers in the [address] [place] [address], the [employer] [facility], and public and private agencies to assist households resolve their housing crisis as quickly as possible. Those in crisis can contact Catherine Vega to discuss the situation and to be connected with available resources. The [address] is open Monday - Friday from 8:30 am - 5:00 pm. Assessments are conducted until 4:00 pm.   [address] [facility] [phone]  If you need shelter in the [facility], contact the [facility] [address]: [phone]. The [facility] [address] facilitates access to resources and shelter alternatives for those who are three days or less away from losing their housing. This line is also available for those already experiencing homelessness.

## 2020-10-08 NOTE — INTERDISCIPLINARY ROUNDS
Behavioral Health Interdisciplinary Rounds Patient Name: Basilio Edward  Age: 62 y.o. Room/Bed:  726/ Primary Diagnosis: <principal problem not specified> Admission Status: Voluntary Readmission within 30 days: no 
Power of  in place: no 
Patient requires a blocked bed: no          Reason for blocked bed: VTE Prophylaxis: No 
 
Mobility needs/Fall risk: no 
Flu Vaccine :   
Nutritional Plan: no 
Consults:         
Labs/Testing due today?: yes: refused Sleep hours:  8 Participation in Care/Groups:  no 
Medication Compliant?: Selective PRNS (last 24 hours): None Restraints (last 24 hours):  no 
  
CIWA (range last 24 hours): COWS (range last 24 hours): Alcohol screening (AUDIT) completed -   AUDIT Score: 0 If applicable, date SBIRT discussed in treatment team AND documented:  
AUDIT Screen Score: AUDIT Score: 0 Tobacco - patient is a smoker:   
Illegal Drugs use:   
 
24 hour chart check complete:   
 
Patient goal(s) for today:  
Treatment team focus/goals: Plan to set up for discharge on Friday. Progress note : gave patient the homeless point of entry information LOS:  2  Expected LOS: TBD Financial concerns/prescription coverage:  [employer] Family contact:  No contact Family requesting physician contact today:   
Discharge plan: homeless Access to weapons :  no Outpatient provider(s): refer to [facility] and the [employer] Patient's preferred phone number for follow up call :  
 
Participating treatment team members: Cherie Purcell, PharmD.

## 2020-10-08 NOTE — PROGRESS NOTES
Laboratory Monitoring for Antipsychotics: This patient is currently prescribed the following medication(s):   Current Facility-Administered Medications   Medication Dose Route Frequency    ARIPiprazole (ABILIFY) tablet 15 mg  15 mg Oral DAILY    atorvastatin (LIPITOR) tablet 10 mg  10 mg Oral DAILY    gabapentin (NEURONTIN) capsule 300 mg  300 mg Oral TID    metFORMIN (GLUCOPHAGE) tablet 1,000 mg  1,000 mg Oral BID WITH MEALS    sertraline (ZOLOFT) tablet 50 mg  50 mg Oral DAILY    losartan (COZAAR) tablet 100 mg  100 mg Oral DAILY     The following labs have been completed for monitoring of antipsychotics and/or mood stabilizers:    Height, Weight, BMI Estimation  Estimated body mass index is 24.91 kg/m² as calculated from the following:    Height as of this encounter: 182.9 cm (72\"). Weight as of this encounter: 83.3 kg (183 lb 10.3 oz). Vital Signs/Blood Pressure  Visit Vitals  BP 99/66 (BP 1 Location: Left arm, BP Patient Position: Sitting)   Pulse 88   Temp 98.4 °F (36.9 °C)   Resp 16   Ht 182.9 cm (72\")   Wt 83.3 kg (183 lb 10.3 oz)   SpO2 96%   BMI 24.91 kg/m²     Renal Function, Hepatic Function and Chemistry  Estimated Creatinine Clearance: 67.2 mL/min (by C-G formula based on SCr of 1.3 mg/dL). Lab Results   Component Value Date/Time    Sodium 135 (L) 10/06/2020 04:20 AM    Potassium 2.9 (L) 10/06/2020 04:20 AM    Chloride 98 10/06/2020 04:20 AM    CO2 25 10/06/2020 04:20 AM    Anion gap 12 10/06/2020 04:20 AM    BUN 27 (H) 10/06/2020 04:20 AM    Creatinine 1.30 10/06/2020 04:20 AM    BUN/Creatinine ratio 21 (H) 10/06/2020 04:20 AM    Bilirubin, total 0.4 10/06/2020 04:20 AM    Protein, total 8.1 10/06/2020 04:20 AM    Albumin 4.3 10/06/2020 04:20 AM    Globulin 3.8 10/06/2020 04:20 AM    A-G Ratio 1.1 10/06/2020 04:20 AM    ALT (SGPT) 58 10/06/2020 04:20 AM    AST (SGOT) 102 (H) 10/06/2020 04:20 AM    Alk.  phosphatase 66 10/06/2020 04:20 AM     Lab Results   Component Value Date/Time Glucose 129 (H) 10/06/2020 04:20 AM    Glucose 102 (H) 09/26/2020 06:18 AM    Glucose (POC) 111 (H) 09/29/2020 07:21 AM     Lab Results   Component Value Date/Time    Hemoglobin A1c 6.7 (H) 06/12/2020 03:53 PM     Hematology  Lab Results   Component Value Date/Time    WBC 8.0 10/06/2020 04:20 AM    RBC 4.45 10/06/2020 04:20 AM    HGB 13.6 10/06/2020 04:20 AM    HCT 40.2 10/06/2020 04:20 AM    MCV 90.3 10/06/2020 04:20 AM    MCH 30.6 10/06/2020 04:20 AM    MCHC 33.8 10/06/2020 04:20 AM    RDW 13.3 10/06/2020 04:20 AM    PLATELET 352 00/92/6034 04:20 AM     Lipids  Lab Results   Component Value Date/Time    Cholesterol, total 158 09/26/2020 06:18 AM    HDL Cholesterol 45 09/26/2020 06:18 AM    LDL, calculated 73 09/26/2020 06:18 AM    Triglyceride 200 (H) 09/26/2020 06:18 AM    CHOL/HDL Ratio 3.5 09/26/2020 06:18 AM     Thyroid Function  Lab Results   Component Value Date/Time    TSH 0.63 09/25/2020 12:27 AM     Assessment/Plan:  Recommended baseline laboratory monitoring has been completed based on this patient's current medication regimen. Could consider further titration of atorvastatin dose with goal of 40mg daily. Follow-up metabolic monitoring labs should be completed in 3 months (quarterly for the first year of antipsychotic therapy).      Denise Anthony, LUNAD

## 2020-10-08 NOTE — BH NOTES
Chief Complaint:  I am okay    Length of Stay: 2 Days    Interval History:  Mr. Adelaide Escobar is little changed. He remains isolative to his room and is selectively verbal with staff. He did not take some of his medications last evening but took them earlier today. Says he is hearing voices but would not elaborate any further. Refused to attend groups with the  today. Minimally interactive during the interview and did not answer most of my questions. Agrees to switch to Abilify due to issues with insurance covering [place]. Past Medical History:  Past Medical History:   Diagnosis Date    Alcohol abuse     Chronic pain     [facility] ([facility]     Diabetes ([facility]     Hepatitis C     Hypertension     Psychiatric disorder     Depression & Schizoaffective Disorder           Labs:  Lab Results   Component Value Date/Time    WBC 8.0 10/06/2020 04:20 AM    HGB 13.6 10/06/2020 04:20 AM    HCT 40.2 10/06/2020 04:20 AM    PLATELET 048 41/27/1697 04:20 AM    MCV 90.3 10/06/2020 04:20 AM      Lab Results   Component Value Date/Time    Sodium 135 (L) 10/06/2020 04:20 AM    Potassium 2.9 (L) 10/06/2020 04:20 AM    Chloride 98 10/06/2020 04:20 AM    CO2 25 10/06/2020 04:20 AM    Anion gap 12 10/06/2020 04:20 AM    Glucose 129 (H) 10/06/2020 04:20 AM    Glucose 102 (H) 09/26/2020 06:18 AM    BUN 27 (H) 10/06/2020 04:20 AM    Creatinine 1.30 10/06/2020 04:20 AM    BUN/Creatinine ratio 21 (H) 10/06/2020 04:20 AM    GFR est AA >60 10/06/2020 04:20 AM    GFR est non-AA 57 (L) 10/06/2020 04:20 AM    Calcium 9.7 10/06/2020 04:20 AM    Bilirubin, total 0.4 10/06/2020 04:20 AM    Alk.  phosphatase 66 10/06/2020 04:20 AM    Protein, total 8.1 10/06/2020 04:20 AM    Albumin 4.3 10/06/2020 04:20 AM    Globulin 3.8 10/06/2020 04:20 AM    A-G Ratio 1.1 10/06/2020 04:20 AM    ALT (SGPT) 58 10/06/2020 04:20 AM      Vitals:    10/07/20 1046 10/07/20 1321 10/07/20 2002 10/08/20 0852   BP: 124/78  119/77 (!) 154/79   Pulse: 79  78 88 Resp: 14  16 16   Temp: 98.3 °F (36.8 °C)  98.5 °F (36.9 °C) 98.3 °F (36.8 °C)   SpO2: 96%  97% 97%   Weight:  83.3 kg (183 lb 10.3 oz)     Height:  6' (1.829 m)           Current Facility-Administered Medications   Medication Dose Route Frequency Provider Last Rate Last Dose    atorvastatin (LIPITOR) tablet 10 mg  10 mg Oral DAILY Marisa Wilson MD   10 mg at 10/08/20 7963    gabapentin (NEURONTIN) capsule 300 mg  300 mg Oral TID Marisa Wilson MD   300 mg at 10/08/20 5449    metFORMIN (GLUCOPHAGE) tablet 1,000 mg  1,000 mg Oral BID WITH MEALS Marisa Wilson MD   1,000 mg at 10/08/20 6561    sertraline (ZOLOFT) tablet 50 mg  50 mg Oral DAILY Marisa Wilson MD   50 mg at 10/08/20 0824    lurasidone (LATUDA) tablet 80 mg  80 mg Oral DAILY WITH Ramses Fernandez MD   80 mg at 10/07/20 1708    losartan (COZAAR) tablet 100 mg  100 mg Oral DAILY Marisa Wilson MD   100 mg at 10/08/20 0824    OLANZapine (ZyPREXA) tablet 5 mg  5 mg Oral Q6H PRN Mitzi Esquivel NP   5 mg at 10/08/20 8114    haloperidol lactate (HALDOL) injection 5 mg  5 mg IntraMUSCular Q6H PRN Abraham Esquivelw, NP        benztropine (COGENTIN) tablet 1 mg  1 mg Oral BID PRN Abraham Esquivelw, NP        diphenhydrAMINE (BENADRYL) injection 50 mg  50 mg IntraMUSCular BID PRN Abraham Esquivelw, NP        hydrOXYzine HCL (ATARAX) tablet 50 mg  50 mg Oral TID PRN Abraham Esquivelw, NP        LORazepam (ATIVAN) injection 1 mg  1 mg IntraMUSCular Q4H PRN Abraham Esquivelw, NP        traZODone (DESYREL) tablet 50 mg  50 mg Oral QHS PRN Abraham Esquivel, DARRIN        acetaminophen (TYLENOL) tablet 650 mg  650 mg Oral Q4H PRN Abraham Esquivel, NP        magnesium hydroxide (MILK OF MAGNESIA) 400 mg/5 mL oral suspension 30 mL  30 mL Oral DAILY PRN Abraham Esquivel, DARRIN             Mental Status Exam:  Eye contact: limited  Grooming: fair  Psychomotor activity: decreased  Speech is spontaneous  Mood is \"down\"  Affect: flat  Perception: AH +  Suicidal ideation: Denies any SI or plan. Cognition is grossly intact       Physical Exam:  Body habitus: Body mass index is 24.91 kg/m². Musculoskeletal system: normal gait  Tremor - neg  Cog wheeling - neg      Assessment and Plan:  Efrain Carlos meets criteria for a diagnosis of Schizoaffective disorder bipolar type, cocaine use disorder, severe, alcohol use disorder, severe. Continue the medication regimen as prescribed  Disposition planning to continue. I certify that this patients inpatient psychiatric hospital services furnished since the previous certification were, and continue to be, required for treatment that could reasonably be expected to improve the patient's condition, or for diagnostic study, and that the patient continues to need, on a daily basis, active treatment furnished directly by or requiring the supervision of inpatient psychiatric facility personnel. In addition, the hospital records show that services furnished were intensive treatment services, admission or related services, or equivalent services.

## 2020-10-09 LAB
ANION GAP SERPL CALC-SCNC: 7 MMOL/L (ref 5–15)
ATRIAL RATE: 74 BPM
BUN SERPL-MCNC: 29 MG/DL (ref 6–20)
BUN/CREAT SERPL: 12 (ref 12–20)
CALCIUM SERPL-MCNC: 9.7 MG/DL (ref 8.5–10.1)
CALCULATED P AXIS, ECG09: 70 DEGREES
CALCULATED R AXIS, ECG10: -83 DEGREES
CALCULATED T AXIS, ECG11: 42 DEGREES
CHLORIDE SERPL-SCNC: 104 MMOL/L (ref 97–108)
CO2 SERPL-SCNC: 28 MMOL/L (ref 21–32)
CREAT SERPL-MCNC: 2.44 MG/DL (ref 0.7–1.3)
DIAGNOSIS, 93000: NORMAL
GLUCOSE SERPL-MCNC: 113 MG/DL (ref 65–100)
P-R INTERVAL, ECG05: 134 MS
POTASSIUM SERPL-SCNC: 3.4 MMOL/L (ref 3.5–5.1)
Q-T INTERVAL, ECG07: 432 MS
QRS DURATION, ECG06: 122 MS
QTC CALCULATION (BEZET), ECG08: 479 MS
SODIUM SERPL-SCNC: 139 MMOL/L (ref 136–145)
VENTRICULAR RATE, ECG03: 74 BPM

## 2020-10-09 PROCEDURE — 65220000003 HC RM SEMIPRIVATE PSYCH

## 2020-10-09 PROCEDURE — 80048 BASIC METABOLIC PNL TOTAL CA: CPT

## 2020-10-09 PROCEDURE — 74011250637 HC RX REV CODE- 250/637: Performed by: NURSE PRACTITIONER

## 2020-10-09 PROCEDURE — 74011250637 HC RX REV CODE- 250/637: Performed by: PSYCHIATRY & NEUROLOGY

## 2020-10-09 PROCEDURE — 36415 COLL VENOUS BLD VENIPUNCTURE: CPT

## 2020-10-09 RX ORDER — SERTRALINE HYDROCHLORIDE 50 MG/1
100 TABLET, FILM COATED ORAL DAILY
Status: DISCONTINUED | OUTPATIENT
Start: 2020-10-10 | End: 2020-10-11

## 2020-10-09 RX ORDER — ARIPIPRAZOLE 10 MG/1
20 TABLET ORAL DAILY
Status: DISCONTINUED | OUTPATIENT
Start: 2020-10-10 | End: 2020-10-12 | Stop reason: HOSPADM

## 2020-10-09 RX ADMIN — METFORMIN HYDROCHLORIDE 1000 MG: 500 TABLET ORAL at 16:18

## 2020-10-09 RX ADMIN — GABAPENTIN 300 MG: 300 CAPSULE ORAL at 08:34

## 2020-10-09 RX ADMIN — LOSARTAN POTASSIUM 100 MG: 50 TABLET, FILM COATED ORAL at 08:35

## 2020-10-09 RX ADMIN — GABAPENTIN 300 MG: 300 CAPSULE ORAL at 16:18

## 2020-10-09 RX ADMIN — ARIPIPRAZOLE 15 MG: 10 TABLET ORAL at 08:35

## 2020-10-09 RX ADMIN — SERTRALINE HYDROCHLORIDE 50 MG: 50 TABLET ORAL at 08:36

## 2020-10-09 RX ADMIN — ATORVASTATIN CALCIUM 10 MG: 10 TABLET, FILM COATED ORAL at 08:35

## 2020-10-09 RX ADMIN — METFORMIN HYDROCHLORIDE 1000 MG: 500 TABLET ORAL at 08:34

## 2020-10-09 RX ADMIN — HYDROXYZINE HYDROCHLORIDE 50 MG: 50 TABLET, FILM COATED ORAL at 16:18

## 2020-10-09 RX ADMIN — GABAPENTIN 300 MG: 300 CAPSULE ORAL at 21:02

## 2020-10-09 NOTE — BH NOTES
PRN Medication Documentation    Specific patient behavior that led to need for PRN medication:c/o anxiety Staff interventions attempted prior to PRN being given: coping skills  PRN medication given: atarax  Patient response/effectiveness of PRN medication:robe aware

## 2020-10-09 NOTE — PROGRESS NOTES
Problem: Depressed Mood (Adult/Pediatric)  Goal: *STG: Participates in treatment plan  Outcome: Progressing Towards Goal  Patient participated in treatment team, continuing to endorse auditory hallucinations stating he needs medication to help him. Patient was told by MD he is already on medications and will increase the Abilify and Zoloft. Patient plan to d/c tomorrow, he will continue to call [facility] to help with placement. Patient calm and cooperative, no issues verbalized or witnessed. Goal: *STG: Attends activities and groups  Outcome: Progressing Towards Goal  Patient isolative to room, only coming out for meals and medication. Goal: *STG: Complies with medication therapy  Outcome: Progressing Towards Goal  Patient compliant with all scheduled medications. Problem: Patient Education: Go to Patient Education Activity  Goal: Patient/Family Education  Outcome: Progressing Towards Goal     1416  Blood obtained for BMP, taken to lab.

## 2020-10-09 NOTE — SUICIDE SAFETY PLAN
SAFETY PLAN    A suicide Safety Plan is a document that supports someone when they are having thoughts of suicide. Warning Signs that indicate a suicidal crisis may be developing: What (situations, thoughts, feelings, body sensations, behaviors, etc.) do you experience that lets you know you are beginning to think about suicide? 1. Thoughts of harming self   2. Thoughts of harming others  3. Internal Coping Strategies:  What things can I do (relaxation techniques, hobbies, physical activities, etc.) to take my mind off my problems without contacting another person? 1. Watch TV  2.  3.    People and social settings that provide distraction: Who can I call or where can I go to distract me? 1. Name: Nobody      People whom I can ask for help: Who can I call when I need help - for example, friends, family, clergy, someone else? 1. Name: Nobody            Professionals or [address] [place] [address] Blvd I can contact during a crisis: Who can I call for help - for example, my doctor, my psychiatrist, my psychologist, a mental health provider, a suicide hotline? 1. Clinician Name: Patient states no clinician        3. Suicide Prevention Lifeline: 6-701-900-TALK (9804)    4. [address] [place] [address] Emergency Services -  for example, [facility] suicide hotline, [facility] Hotline:  633-9610 (Mental Health Crisis line)      Emergency Services Address: [address], [address] [facility]      Emergency Services Phone: [phone]    Making the environment safe: How can I make my environment (house/apartment/living space) safer? For example, can I remove guns, medications, and other items? 1. Patient homeless, not able to make living environment safer.

## 2020-10-09 NOTE — PROGRESS NOTES
Problem: Falls - Risk of  Goal: *Absence of Falls  Description: Document Camilla Strickland Fall Risk and appropriate interventions in the flowsheet. Outcome: Progressing Towards Goal  Note: Fall Risk Interventions:            Medication Interventions: Teach patient to arise slowly          Pt. Received resting in bed, NAD, respirations even and unlabored. Will continue q15 safety rounds.

## 2020-10-09 NOTE — INTERDISCIPLINARY ROUNDS
Behavioral Health Interdisciplinary Rounds Patient Name: Alex Munguia  Age: 62 y.o. Room/Bed:  726/02 Primary Diagnosis: <principal problem not specified> Admission Status: Voluntary Readmission within 30 days: yes Power of  in place: no 
Patient requires a blocked bed: no          Reason for blocked bed: VTE Prophylaxis: No 
 
Mobility needs/Fall risk: no 
Flu Vaccine :   
Nutritional Plan: no 
Consults:         
Labs/Testing due today?: yes Sleep hours:  7 Participation in Care/Groups:  no 
Medication Compliant?: Yes PRNS (last 24 hours): Antipsychotic (PO) Restraints (last 24 hours):  no 
  
CIWA (range last 24 hours): COWS (range last 24 hours): Alcohol screening (AUDIT) completed -   AUDIT Score: 0 If applicable, date SBIRT discussed in treatment team AND documented:  
AUDIT Screen Score: AUDIT Score: 0 Tobacco - patient is a smoker: Have You Used Tobacco in the Past 30 Days: Yes Illegal Drugs use: Have You Used Any Illegal Substances Over the Past 12 Months: Yes 
 
24 hour chart check complete: yes Patient goal(s) for today:  
Treatment team focus/goals: plan for lab work today and plan to discharge on Saturday Progress note : He states he continues to hear voices, does not feel safe to discharge. LOS:  2 Expected LOS: Saturday discharge Financial concerns/prescription coverage:  [employer] Family contact:  No contact Family requesting physician contact today:   
Discharge plan: homeless Access to weapons :   no Outpatient provider(s): [facility] to [facility] Patient's preferred phone number for follow up call :  
 
Participating treatment team members: Nadya Kenney Ra - hBavesh Huynh RN - Pamela Saavedra.

## 2020-10-09 NOTE — BH NOTES
Chief Complaint:  I am better today. Length of Stay: 3 Days    Interval History:  Mr. John Reina is better today. Says he was able to sleep better but is still hearing voices. The frequency has decreased today. Denies any SI or plan. He has been more visible in the milieu and asked for his medications. Calm and cooperative during the interview. Denies any withdrawal symptoms currently. Past Medical History:  Past Medical History:   Diagnosis Date    Alcohol abuse     Chronic pain     [facility] abuse ([facility] [address]     Diabetes [facility]     Hepatitis C     Hypertension     Psychiatric disorder     Depression & Schizoaffective Disorder           Labs:  Lab Results   Component Value Date/Time    WBC 8.0 10/06/2020 04:20 AM    HGB 13.6 10/06/2020 04:20 AM    HCT 40.2 10/06/2020 04:20 AM    PLATELET 321 74/94/9721 04:20 AM    MCV 90.3 10/06/2020 04:20 AM      Lab Results   Component Value Date/Time    Sodium 135 (L) 10/06/2020 04:20 AM    Potassium 2.9 (L) 10/06/2020 04:20 AM    Chloride 98 10/06/2020 04:20 AM    CO2 25 10/06/2020 04:20 AM    Anion gap 12 10/06/2020 04:20 AM    Glucose 129 (H) 10/06/2020 04:20 AM    Glucose 102 (H) 09/26/2020 06:18 AM    BUN 27 (H) 10/06/2020 04:20 AM    Creatinine 1.30 10/06/2020 04:20 AM    BUN/Creatinine ratio 21 (H) 10/06/2020 04:20 AM    GFR est AA >60 10/06/2020 04:20 AM    GFR est non-AA 57 (L) 10/06/2020 04:20 AM    Calcium 9.7 10/06/2020 04:20 AM    Bilirubin, total 0.4 10/06/2020 04:20 AM    Alk.  phosphatase 66 10/06/2020 04:20 AM    Protein, total 8.1 10/06/2020 04:20 AM    Albumin 4.3 10/06/2020 04:20 AM    Globulin 3.8 10/06/2020 04:20 AM    A-G Ratio 1.1 10/06/2020 04:20 AM    ALT (SGPT) 58 10/06/2020 04:20 AM      Vitals:    10/08/20 0852 10/08/20 1545 10/09/20 0756 10/09/20 1116   BP: (!) 154/79 99/66 (!) 119/90 110/73   Pulse: 88 88 89 73   Resp: 16 16 16 16   Temp: 98.3 °F (36.8 °C) 98.4 °F (36.9 °C) 98.6 °F (37 °C) 98.7 °F (37.1 °C)   SpO2: 97% 96% 98% 97%   Weight: Height:             Current Facility-Administered Medications   Medication Dose Route Frequency Provider Last Rate Last Dose    ARIPiprazole (ABILIFY) tablet 15 mg  15 mg Oral DAILY Lisa Ramos MD   15 mg at 10/09/20 0835    atorvastatin (LIPITOR) tablet 10 mg  10 mg Oral DAILY Lisa Ramos MD   10 mg at 10/09/20 4917    gabapentin (NEURONTIN) capsule 300 mg  300 mg Oral TID Lisa Ramos MD   300 mg at 10/09/20 9963    metFORMIN (GLUCOPHAGE) tablet 1,000 mg  1,000 mg Oral BID WITH MEALS Lisa Ramos MD   1,000 mg at 10/09/20 3805    sertraline (ZOLOFT) tablet 50 mg  50 mg Oral DAILY Lisa Ramos MD   50 mg at 10/09/20 8594    losartan (COZAAR) tablet 100 mg  100 mg Oral DAILY Lisa Ramos MD   100 mg at 10/09/20 0835    OLANZapine (ZyPREXA) tablet 5 mg  5 mg Oral Q6H PRN Calderon-ChapoAng NP   5 mg at 10/08/20 3198    haloperidol lactate (HALDOL) injection 5 mg  5 mg IntraMUSCular Q6H PRN Calderon-Chapo, Vedia Gibson, NP        benztropine (COGENTIN) tablet 1 mg  1 mg Oral BID PRN Calderon-Chapo, Vedia Gibson, NP        diphenhydrAMINE (BENADRYL) injection 50 mg  50 mg IntraMUSCular BID PRN Calderon-Chapo, Vedia Gibson, NP        hydrOXYzine HCL (ATARAX) tablet 50 mg  50 mg Oral TID PRN Calderon-Chapo, Vedia Gibson, NP        LORazepam (ATIVAN) injection 1 mg  1 mg IntraMUSCular Q4H PRN Calderon-Chapo, Vedia Gibson, NP        traZODone (DESYREL) tablet 50 mg  50 mg Oral QHS PRN Calderon-Chapo, Vedia Gibson, NP        acetaminophen (TYLENOL) tablet 650 mg  650 mg Oral Q4H PRN Calderon-Chapo, Vedia Gibson, NP        magnesium hydroxide (MILK OF MAGNESIA) 400 mg/5 mL oral suspension 30 mL  30 mL Oral DAILY PRN Mitzi Esquivel, NP             Mental Status Exam:  Eye contact: limited  Grooming: fair  Psychomotor activity: decreased  Speech is spontaneous  Mood is \"down\"  Affect: flat  Perception: AH + No CAH  Suicidal ideation: Denies any SI or plan.   Cognition is grossly intact       Physical Exam:  Body habitus: Body mass index is 24.91 kg/m². Musculoskeletal system: normal gait  Tremor - neg  Cog wheeling - neg      Assessment and Plan:  Yao Lezama meets criteria for a diagnosis of Schizoaffective disorder bipolar type, cocaine use disorder, severe, alcohol use disorder, severe. Increased dosage of Abilify and Sertraline. Continue the medication regimen as prescribed  Disposition planning to continue and plan for discharge tomorrow. I certify that this patients inpatient psychiatric hospital services furnished since the previous certification were, and continue to be, required for treatment that could reasonably be expected to improve the patient's condition, or for diagnostic study, and that the patient continues to need, on a daily basis, active treatment furnished directly by or requiring the supervision of inpatient psychiatric facility personnel. In addition, the hospital records show that services furnished were intensive treatment services, admission or related services, or equivalent services.

## 2020-10-09 NOTE — PROGRESS NOTES
Problem: Depressed Mood (Adult/Pediatric)  Goal: *STG: Participates in treatment plan  Outcome: Progressing Towards Goal  Pt complies with meds. Focus is on securing follow-up plans for discharge, next week.

## 2020-10-10 ENCOUNTER — APPOINTMENT (OUTPATIENT)
Dept: ULTRASOUND IMAGING | Age: 60
DRG: 885 | End: 2020-10-10
Attending: HOSPITALIST
Payer: MEDICARE

## 2020-10-10 LAB
APPEARANCE UR: CLEAR
BACTERIA URNS QL MICRO: NEGATIVE /HPF
BILIRUB UR QL: NEGATIVE
COLOR UR: NORMAL
CREAT UR-MCNC: 119 MG/DL
EPITH CASTS URNS QL MICRO: NORMAL /LPF
GLUCOSE BLD STRIP.AUTO-MCNC: 110 MG/DL (ref 65–100)
GLUCOSE BLD STRIP.AUTO-MCNC: 98 MG/DL (ref 65–100)
GLUCOSE UR STRIP.AUTO-MCNC: NEGATIVE MG/DL
HGB UR QL STRIP: NEGATIVE
HYALINE CASTS URNS QL MICRO: NORMAL /LPF (ref 0–5)
KETONES UR QL STRIP.AUTO: NEGATIVE MG/DL
LEUKOCYTE ESTERASE UR QL STRIP.AUTO: NEGATIVE
NITRITE UR QL STRIP.AUTO: NEGATIVE
PH UR STRIP: 5 [PH] (ref 5–8)
PROT UR STRIP-MCNC: NEGATIVE MG/DL
RBC #/AREA URNS HPF: NORMAL /HPF (ref 0–5)
SERVICE CMNT-IMP: ABNORMAL
SERVICE CMNT-IMP: NORMAL
SODIUM UR-SCNC: 54 MMOL/L
SP GR UR REFRACTOMETRY: 1.01 (ref 1–1.03)
UROBILINOGEN UR QL STRIP.AUTO: 0.2 EU/DL (ref 0.2–1)
WBC URNS QL MICRO: NORMAL /HPF (ref 0–4)

## 2020-10-10 PROCEDURE — 74011250637 HC RX REV CODE- 250/637: Performed by: HOSPITALIST

## 2020-10-10 PROCEDURE — 74011250637 HC RX REV CODE- 250/637: Performed by: PSYCHIATRY & NEUROLOGY

## 2020-10-10 PROCEDURE — 76770 US EXAM ABDO BACK WALL COMP: CPT

## 2020-10-10 PROCEDURE — 65220000003 HC RM SEMIPRIVATE PSYCH

## 2020-10-10 PROCEDURE — 82570 ASSAY OF URINE CREATININE: CPT

## 2020-10-10 PROCEDURE — 81001 URINALYSIS AUTO W/SCOPE: CPT

## 2020-10-10 PROCEDURE — 82962 GLUCOSE BLOOD TEST: CPT

## 2020-10-10 PROCEDURE — 84300 ASSAY OF URINE SODIUM: CPT

## 2020-10-10 RX ORDER — MAGNESIUM SULFATE 100 %
4 CRYSTALS MISCELLANEOUS AS NEEDED
Status: DISCONTINUED | OUTPATIENT
Start: 2020-10-10 | End: 2020-10-12 | Stop reason: HOSPADM

## 2020-10-10 RX ORDER — DEXTROSE MONOHYDRATE 100 MG/ML
0-250 INJECTION, SOLUTION INTRAVENOUS AS NEEDED
Status: DISCONTINUED | OUTPATIENT
Start: 2020-10-10 | End: 2020-10-12 | Stop reason: HOSPADM

## 2020-10-10 RX ORDER — POTASSIUM CHLORIDE 750 MG/1
40 TABLET, FILM COATED, EXTENDED RELEASE ORAL
Status: COMPLETED | OUTPATIENT
Start: 2020-10-10 | End: 2020-10-10

## 2020-10-10 RX ORDER — INSULIN LISPRO 100 [IU]/ML
INJECTION, SOLUTION INTRAVENOUS; SUBCUTANEOUS
Status: DISCONTINUED | OUTPATIENT
Start: 2020-10-10 | End: 2020-10-12 | Stop reason: HOSPADM

## 2020-10-10 RX ADMIN — ATORVASTATIN CALCIUM 10 MG: 10 TABLET, FILM COATED ORAL at 08:52

## 2020-10-10 RX ADMIN — ARIPIPRAZOLE 20 MG: 10 TABLET ORAL at 08:52

## 2020-10-10 RX ADMIN — SERTRALINE HYDROCHLORIDE 100 MG: 50 TABLET ORAL at 08:52

## 2020-10-10 RX ADMIN — GABAPENTIN 300 MG: 300 CAPSULE ORAL at 16:29

## 2020-10-10 RX ADMIN — GABAPENTIN 300 MG: 300 CAPSULE ORAL at 08:52

## 2020-10-10 RX ADMIN — GABAPENTIN 300 MG: 300 CAPSULE ORAL at 21:02

## 2020-10-10 RX ADMIN — POTASSIUM CHLORIDE 40 MEQ: 750 TABLET, FILM COATED, EXTENDED RELEASE ORAL at 14:25

## 2020-10-10 RX ADMIN — LOSARTAN POTASSIUM 100 MG: 50 TABLET, FILM COATED ORAL at 08:52

## 2020-10-10 RX ADMIN — METFORMIN HYDROCHLORIDE 1000 MG: 500 TABLET ORAL at 08:51

## 2020-10-10 NOTE — BH NOTES
GROUP THERAPY PROGRESS NOTE    Patient is participating in coping skills group. Group time: 50 minutes    Personal goal for participation: Learn coping skills to reduce stress and anxiety    Goal orientation: Personal    Group therapy participation: passive    Therapeutic interventions reviewed and discussed: Group discussion on ways patients are coping with anxiety and stress and ways they relax. Discussion regarding alternative coping skills using the letters of the alphabet so that each patient would have a list of at least 26 different coping skills. Impression of participation: Patient sat passively in group. Did not participate or engage in group discussion, he was quiet the entire time.      Sam Bain, Supervisee in Social Work

## 2020-10-10 NOTE — PROGRESS NOTES
Problem: Depressed Mood (Adult/Pediatric)  Goal: *STG: Participates in treatment plan  Outcome: Progressing Towards Goal     Problem: Depressed Mood (Adult/Pediatric)  Goal: *STG: Verbalizes anger, guilt, and other feelings in a constructive manor  Outcome: Progressing Towards Goal     Problem: Depressed Mood (Adult/Pediatric)  Goal: *STG: Remains safe in hospital  Outcome: Progressing Towards Goal     Problem: Depressed Mood (Adult/Pediatric)  Goal: *STG: Complies with medication therapy  Outcome: Progressing Towards Goal     Problem: Depressed Mood (Adult/Pediatric)  Goal: Interventions  Outcome: Progressing Towards Goal  Note: Pt is calm cooperative pleasant. Depressed mood. Medication meal compliant. Increased time spent visible on the unit. Engaged in treatment.  Increase oral fluid intake is priority goal.

## 2020-10-10 NOTE — CONSULTS
History & Physical    Primary Care Provider: Basia Malik MD  Source of Information: Patient and nurse     History of Presenting Illness:   Debora Marc is a 62 y.o. male , medical team is consulted for worsening renal function     The patient is a 44-year-old [facility] [place] man with h/o DM, HTN, he is homeless, polysubstance abuse and multiple psych [facility]. He was admitted on 10/6, which was 4 days ago to psych unit for alcohol abuse and Schizoaffective disorder bipolar type with homicide ideation . Per nurse, the first 3 days of staying in the psych unit, he was very isolated, not coming out his room that often and may not have enough oral intake. He was still receiving his losartan and metfromin and noticed BP in low 90's to 100 range and BG also not high. Pt denied vomiting, no diarrhea. + dizziness when standing up. No cp. No sob. Urine flow was slow, but denied any voiding issue      Review of Systems:  General: hpi, no changes of weight  HEENT: no headache, no vision changes, no nose discharge, no hearing changes   RES: no wheezing, no cough, no sob  CVS: no cp, no palpitation. Muscular: no joint swelling, chronic back pain, Left more than right   Skin: no rash, no itching   GI: no vomiting, no diarrhea  : no dysuria, no hematuria, HPI   Hemo: no gum bleeding, no petechial   Neuro: no sensation changes, no focal weakness   Endo: no polydipsia   Psych: HPI. Past Medical History:   Diagnosis Date    Alcohol abuse     Chronic pain     Coc[facility] abuse ([facility]     Diabetes ([facility]     Hepatitis C     Hypertension     Psychiatric disorder     Depression & Schizoaffective Disorder      Past Surgical History:   Procedure Laterality Date    HX ORTHOPAEDIC      right foot gangrene     Prior to Admission medications    Medication Sig Start Date End Date Taking?  Authorizing Provider   lurasidone (LATUDA) 80 mg tab tablet Take 2 Tabs by mouth daily (with dinner) for 30 days. Indications: bipolar depression 9/29/20 10/29/20  Severo Maya, MD   sertraline (ZOLOFT) 50 mg tablet Take 1 Tab by mouth daily. Indications: anxiousness associated with depression 9/30/20   Severo Maya, MD   gabapentin (NEURONTIN) 300 mg capsule Take 1 Cap by mouth three (3) times daily. Max Daily Amount: 900 mg. Indications: neuropathic pain 9/29/20   Severo Maya, MD   valsartan (DIOVAN) 160 mg tablet Take 1 Tab by mouth daily. Indications: high blood pressure 8/11/20   Thelma Zhang NP   metFORMIN (GLUCOPHAGE) 1,000 mg tablet Take 1 Tab by mouth two (2) times daily (with meals). Indications: type 2 diabetes mellitus 7/2/20   Fidel Mccrary MD   atorvastatin (LIPITOR) 10 mg tablet Take 1 Tab by mouth daily. Indications: prevent stroke 5/22/20   Fidel Mccrary MD     Allergies   Allergen Reactions    Tramadol Nausea and Vomiting    Lisinopril Cough     Developed cough while taking lisinopril      Family History   Problem Relation Age of Onset    Lung Cancer Mother         SOCIAL HISTORY:  Patient resides:  Independently x   Assisted Living    SNF    With family care       Smoking history:   None    Former    Chronic x     Alcohol history:   None    Social    Chronic x     Ambulates:   Independently x   w/cane    w/walker    w/wc    CODE STATUS:  DNR    Full x   Other      Objective:     Physical Exam:     Visit Vitals  /84 (BP 1 Location: Right arm, BP Patient Position: Sitting)   Pulse 96   Temp 98.5 °F (36.9 °C)   Resp 16   Ht 6' (1.829 m)   Wt 83.3 kg (183 lb 10.3 oz)   SpO2 96%   BMI 24.91 kg/m²      O2 Device: Room air    General:  Alert, cooperative, no distress, appears stated age. Head:  Normocephalic, without obvious abnormality, atraumatic. Eyes:  Conjunctivae/corneas clear. PERRL, EOMs intact. Nose: Nares normal. Septum midline. Mucosa normal. No drainage or sinus tenderness. Throat: Lips, mucosa, and tongue dry.     Neck: Supple, symmetrical, trachea midline, no adenopathy, thyroid: no enlargement/tenderness/nodules, no carotid bruit and no JVD. Back:   Symmetric, no curvature. ROM normal. Left lower back pain, No CVA tenderness. Lungs:   Clear to auscultation bilaterally. Chest wall:  No tenderness or deformity. Heart:  Regular rate and rhythm, S1, S2 normal, no murmur, click, rub or gallop. Abdomen:   Soft, non-tender. Bowel sounds normal. No masses,  No organomegaly. Extremities: Extremities normal, atraumatic, no cyanosis or edema. Pulses: 2+ and symmetric all extremities. Skin: Skin color, texture, turgor normal. No rashes or lesions   Neurologic: CNII-XII intact. No focal weakness              Data Review:     Recent Days:  No results for input(s): WBC, HGB, HCT, PLT, HGBEXT, HCTEXT, PLTEXT in the last 72 hours. Recent Labs     10/09/20  1410      K 3.4*      CO2 28   *   BUN 29*   CREA 2.44*   CA 9.7     No results for input(s): PH, PCO2, PO2, HCO3, FIO2 in the last 72 hours. 24 Hour Results:  Recent Results (from the past 24 hour(s))   METABOLIC PANEL, BASIC    Collection Time: 10/09/20  2:10 PM   Result Value Ref Range    Sodium 139 136 - 145 mmol/L    Potassium 3.4 (L) 3.5 - 5.1 mmol/L    Chloride 104 97 - 108 mmol/L    CO2 28 21 - 32 mmol/L    Anion gap 7 5 - 15 mmol/L    Glucose 113 (H) 65 - 100 mg/dL    BUN 29 (H) 6 - 20 MG/DL    Creatinine 2.44 (H) 0.70 - 1.30 MG/DL    BUN/Creatinine ratio 12 12 - 20      GFR est AA 33 (L) >60 ml/min/1.73m2    GFR est non-AA 27 (L) >60 ml/min/1.73m2    Calcium 9.7 8.5 - 10.1 MG/DL         Imaging:   No results found. Assessment:     Active Problems:    Schizophrenia ([address] (10/6/2020)           Plan:     1. RILEY: with CKD 3, baseline cr around 1.4-1.5. most likely pre-renal, may have component of ATN due to low BP. Plan discussed with psych nurse, will be very challenge to start IVF in this unit.  Will encourage pt to drink fluid, at least 2 Liters today, will check urine cr, Na, to eval his FENA, renal US to r/o hydronephrosis/obstruction. Hold his ARBs and Metformin for now. Will repeat renal function, in AM, if Cr worse, then should start IVF. 2. HTN: hold his losartan, and monitor, will re-assess when renal function better, may need other agent if BP high  3. DM: stop his metformin now. SSI and monitor BG   4.  Hypokalemia:  po KCL, follow K in am        Signed By: Lupe Campos MD     October 10, 2020

## 2020-10-10 NOTE — PROGRESS NOTES
1330: Dr. Sarai Clark paged and return call received, per Dr. Kenisha Miller order for hospitalist consult- related to pt's increased creatinine level and need for medication evaluation. Hospitalist aware of consult, will see pt. Pt calm and cooperative, medication and meal compliant. Spends much time in his room in bed. Staff encourages pt to participate in groups and activities with peers and staff. Will continue to monitor with q15 minute checks for safety.     Problem: Depressed Mood (Adult/Pediatric)  Goal: *STG: Participates in treatment plan  Outcome: Progressing Towards Goal  Goal: *STG: Attends activities and groups  Outcome: Progressing Towards Goal  Goal: *STG: Complies with medication therapy  Outcome: Progressing Towards Goal

## 2020-10-10 NOTE — GROUP NOTE
[facility] GROUP DOCUMENTATION INDIVIDUAL Group Therapy Note Date: 10/9/2020 Group Start Time: 5 Group End Time: 2022 Group Topic: Reflection/Relaxation [address] [facility] Therapy Note Attendees: 6/8 patients attended group Attendance: Attended Patient's Goal:  Patient was not able to attended the reflection portion Interventions/techniques: Informed Follows Directions: Followed directions Interactions: Interacted appropriately Mental Status: Flat Behavior/appearance: Cooperative and [employer] Goals Achieved: Able to engage in interactions Additional Notes:  Patient was participating in the stretches during group while sitting. When asked by the tech why they were sitting the patient reported feeling dizzy, so the nurse was notified. The patient then left group with the nurse and did not return. Jacque Parr

## 2020-10-10 NOTE — BH NOTES
Chief Complaint:  I am better today. Length of Stay: 4 Days    Interval History:  Mr. Jonas Rivers is slowly improving. His creatinine was increased and Hospitalist input is much appreciated. Says he feels \"okay today\" and was encouraged to increased his oral intake. More visible in the milieu and watching TV with his peers. Denies any Ah or Vh today. Denies any SI or plan today. Calm and cooperative during the interview. Past Medical History:  Past Medical History:   Diagnosis Date    Alcohol abuse     Chronic pain     Cocaine [facility] ([facility] [address] [facility]     Diabetes ([facility] [address] [facility]     Hepatitis C     Hypertension     Psychiatric disorder     Depression & Schizoaffective Disorder           Labs:  Lab Results   Component Value Date/Time    WBC 8.0 10/06/2020 04:20 AM    HGB 13.6 10/06/2020 04:20 AM    HCT 40.2 10/06/2020 04:20 AM    PLATELET 663 18/09/0209 04:20 AM    MCV 90.3 10/06/2020 04:20 AM      Lab Results   Component Value Date/Time    Sodium 139 10/09/2020 02:10 PM    Potassium 3.4 (L) 10/09/2020 02:10 PM    Chloride 104 10/09/2020 02:10 PM    CO2 28 10/09/2020 02:10 PM    Anion gap 7 10/09/2020 02:10 PM    Glucose 113 (H) 10/09/2020 02:10 PM    Glucose 102 (H) 09/26/2020 06:18 AM    BUN 29 (H) 10/09/2020 02:10 PM    Creatinine 2.44 (H) 10/09/2020 02:10 PM    BUN/Creatinine ratio 12 10/09/2020 02:10 PM    GFR est AA 33 (L) 10/09/2020 02:10 PM    GFR est non-AA 27 (L) 10/09/2020 02:10 PM    Calcium 9.7 10/09/2020 02:10 PM    Bilirubin, total 0.4 10/06/2020 04:20 AM    Alk.  phosphatase 66 10/06/2020 04:20 AM    Protein, total 8.1 10/06/2020 04:20 AM    Albumin 4.3 10/06/2020 04:20 AM    Globulin 3.8 10/06/2020 04:20 AM    A-G Ratio 1.1 10/06/2020 04:20 AM    ALT (SGPT) 58 10/06/2020 04:20 AM      Vitals:    10/09/20 1614 10/09/20 1959 10/09/20 2046 10/10/20 0807   BP: 112/71 99/63 116/68 126/84   Pulse: 88 98  96   Resp: 16 16  16   Temp: 98.7 °F (37.1 °C) 98.3 °F (36.8 °C)  98.5 °F (36.9 °C)   SpO2: 96% 98%  96%   Weight: Height:             Current Facility-Administered Medications   Medication Dose Route Frequency Provider Last Rate Last Dose    glucose chewable tablet 16 g  4 Tab Oral PRN Shun Eng MD        glucagon [facility]) injection 1 mg  1 mg IntraMUSCular PRN Shun Eng MD        dextrose 10% infusion 0-250 mL  0-250 mL IntraVENous PRN Shun Eng MD        insulin lispro (HUMALOG) injection   SubCUTAneous AC&HS Shun Eng MD        ARIPiprazole (ABILIFY) tablet 20 mg  20 mg Oral DAILY Alicia Kwok MD   20 mg at 10/10/20 6199    sertraline (ZOLOFT) tablet 100 mg  100 mg Oral DAILY Alicia Kwok MD   100 mg at 10/10/20 4526    atorvastatin (LIPITOR) tablet 10 mg  10 mg Oral DAILY Alicia Kwok MD   10 mg at 10/10/20 9257    gabapentin (NEURONTIN) capsule 300 mg  300 mg Oral TID Alicia Kwok MD   300 mg at 10/10/20 8034    OLANZapine (ZyPREXA) tablet 5 mg  5 mg Oral Q6H PRN Luca Esquivel NP   5 mg at 10/08/20 0996    haloperidol lactate (HALDOL) injection 5 mg  5 mg IntraMUSCular Q6H PRN [facility], NP        benztropine (COGENTIN) tablet 1 mg  1 mg Oral BID PRN [facility], [facility]        diphenhydrAMINE (BENADRYL) injection 50 mg  50 mg IntraMUSCular BID PRN [facility], NP        hydrOXYzine HCL (ATARAX) tablet 50 mg  50 mg Oral TID PRN Mitzi Esquivel NP   50 mg at 10/09/20 1618    LORazepam (ATIVAN) injection 1 mg  1 mg IntraMUSCular Q4H PRN [facility], NP        traZODone (DESYREL) tablet 50 mg  50 mg Oral QHS PRN [facility], NP        acetaminophen (TYLENOL) tablet 650 mg  650 mg Oral Q4H PRN [facility], NP        magnesium hydroxide (MILK OF MAGNESIA) 400 mg/5 mL oral suspension 30 mL  30 mL Oral DAILY PRN Pedrito Esquivel, DARRIN             Mental Status Exam:  Eye contact: limited  Grooming: fair  Psychomotor activity: decreased  Speech is spontaneous  Mood is \"down\"  Affect: flat  Perception: AH + No CAH  Suicidal ideation: Denies any SI or plan. Cognition is grossly intact       Physical Exam:  Body habitus: Body mass index is 24.91 kg/m². Musculoskeletal system: normal gait  Tremor - neg  Cog wheeling - neg      Assessment and Plan:  Rafael Mahmood meets criteria for a diagnosis of Schizoaffective disorder bipolar type, cocaine use disorder, severe, alcohol use disorder, severe. Continue the medication regimen as prescribed  Disposition planning to continue and plan for discharge tomorrow. I certify that this patients inpatient psychiatric hospital services furnished since the previous certification were, and continue to be, required for treatment that could reasonably be expected to improve the patient's condition, or for diagnostic study, and that the patient continues to need, on a daily basis, active treatment furnished directly by or requiring the supervision of inpatient psychiatric facility personnel. In addition, the hospital records show that services furnished were intensive treatment services, admission or related services, or equivalent services.

## 2020-10-10 NOTE — BH NOTES
Pt verbalizes understanding of information and education provided by Dr. Malik Heredia. Pt agrees to drink 2 liters of fluids prior to lab work scheduled for morning. Pt will allow VS q 4 hrs while awake due to BP medication being held at this time. Pt agrees to allow BS POC Checks due to Metformin being held at this time. 1428- pt compliant with KLOR-CON 10 40mEq stat order. States he will provide urine sample. 1525- 1 liter oral fluid intake completed    1800- 400 mL oral fluid intake.

## 2020-10-11 ENCOUNTER — APPOINTMENT (OUTPATIENT)
Dept: CT IMAGING | Age: 60
DRG: 885 | End: 2020-10-11
Attending: NURSE PRACTITIONER
Payer: MEDICARE

## 2020-10-11 LAB
ALBUMIN SERPL-MCNC: 3.7 G/DL (ref 3.5–5)
ALBUMIN/GLOB SERPL: 1.1 {RATIO} (ref 1.1–2.2)
ALP SERPL-CCNC: 60 U/L (ref 45–117)
ALT SERPL-CCNC: 26 U/L (ref 12–78)
ANION GAP SERPL CALC-SCNC: 7 MMOL/L (ref 5–15)
AST SERPL-CCNC: 14 U/L (ref 15–37)
BILIRUB SERPL-MCNC: 0.7 MG/DL (ref 0.2–1)
BUN SERPL-MCNC: 18 MG/DL (ref 6–20)
BUN/CREAT SERPL: 15 (ref 12–20)
CALCIUM SERPL-MCNC: 9.8 MG/DL (ref 8.5–10.1)
CHLORIDE SERPL-SCNC: 101 MMOL/L (ref 97–108)
CO2 SERPL-SCNC: 28 MMOL/L (ref 21–32)
CREAT SERPL-MCNC: 1.24 MG/DL (ref 0.7–1.3)
GLOBULIN SER CALC-MCNC: 3.5 G/DL (ref 2–4)
GLUCOSE BLD STRIP.AUTO-MCNC: 102 MG/DL (ref 65–100)
GLUCOSE BLD STRIP.AUTO-MCNC: 105 MG/DL (ref 65–100)
GLUCOSE BLD STRIP.AUTO-MCNC: 116 MG/DL (ref 65–100)
GLUCOSE BLD STRIP.AUTO-MCNC: 127 MG/DL (ref 65–100)
GLUCOSE SERPL-MCNC: 98 MG/DL (ref 65–100)
MAGNESIUM SERPL-MCNC: 1.2 MG/DL (ref 1.6–2.4)
PHOSPHATE SERPL-MCNC: 3.9 MG/DL (ref 2.6–4.7)
POTASSIUM SERPL-SCNC: 3.5 MMOL/L (ref 3.5–5.1)
PROT SERPL-MCNC: 7.2 G/DL (ref 6.4–8.2)
SERVICE CMNT-IMP: ABNORMAL
SODIUM SERPL-SCNC: 136 MMOL/L (ref 136–145)

## 2020-10-11 PROCEDURE — 84100 ASSAY OF PHOSPHORUS: CPT

## 2020-10-11 PROCEDURE — 82962 GLUCOSE BLOOD TEST: CPT

## 2020-10-11 PROCEDURE — 80053 COMPREHEN METABOLIC PANEL: CPT

## 2020-10-11 PROCEDURE — 36415 COLL VENOUS BLD VENIPUNCTURE: CPT

## 2020-10-11 PROCEDURE — 65220000003 HC RM SEMIPRIVATE PSYCH

## 2020-10-11 PROCEDURE — 74176 CT ABD & PELVIS W/O CONTRAST: CPT

## 2020-10-11 PROCEDURE — 74011250637 HC RX REV CODE- 250/637: Performed by: PSYCHIATRY & NEUROLOGY

## 2020-10-11 PROCEDURE — 83735 ASSAY OF MAGNESIUM: CPT

## 2020-10-11 RX ORDER — SERTRALINE HYDROCHLORIDE 50 MG/1
150 TABLET, FILM COATED ORAL DAILY
Status: DISCONTINUED | OUTPATIENT
Start: 2020-10-12 | End: 2020-10-12 | Stop reason: HOSPADM

## 2020-10-11 RX ADMIN — ATORVASTATIN CALCIUM 10 MG: 10 TABLET, FILM COATED ORAL at 09:02

## 2020-10-11 RX ADMIN — GABAPENTIN 300 MG: 300 CAPSULE ORAL at 09:02

## 2020-10-11 RX ADMIN — GABAPENTIN 300 MG: 300 CAPSULE ORAL at 16:50

## 2020-10-11 RX ADMIN — SERTRALINE HYDROCHLORIDE 100 MG: 50 TABLET ORAL at 09:02

## 2020-10-11 RX ADMIN — GABAPENTIN 300 MG: 300 CAPSULE ORAL at 21:23

## 2020-10-11 RX ADMIN — ARIPIPRAZOLE 20 MG: 10 TABLET ORAL at 09:02

## 2020-10-11 NOTE — PROGRESS NOTES
2300: Resting quietly in bed with eyes closed. No apparent distress. Respirations are even and unlabored. Staff will continue to monitor q15 throughout the shift. Problem: Falls - Risk of  Goal: *Absence of Falls  Description: Document Deshawn Valles Fall Risk and appropriate interventions in the flowsheet.   Outcome: Progressing Towards Goal  Note: Fall Risk Interventions:            Medication Interventions: Teach patient to arise slowly

## 2020-10-11 NOTE — PROGRESS NOTES
[facility]  Hospitalist Group                                                                                          Hospitalist Progress Note  Leobardo Cadena NP  Answering service: [phone] from in house phone        Date of Service:  10/11/2020  NAME:  Christine Cardenas  :  1960  MRN:  [id number]      Admission Summary:   Per H&P: Christine Cardenas is a 62 y.o. male , medical team is consulted for worsening renal function      The patient is a 41-year-old On license of [facility] American man with h/o DM, HTN, he is homeless, polysubstance abuse and multiple psych admssion. He was admitted on 10/6, which was 4 days ago to psych unit for alcohol abuse and Schizoaffective disorder bipolar type with homicide ideation . Per nurse, the first 3 days of staying in the psych unit, he was very isolated, not coming out his room that often and may not have enough oral intake. He was still receiving his losartan and metfromin and noticed BP in low 90's to 100 range and BG also not high. Pt denied vomiting, no diarrhea. + dizziness when standing up. No cp. No sob. Urine flow was slow, but denied any voiding issue     Interval history / Subjective:    Seen and examined patient. States that he is feeling good today. Discussed with him US results and CT result. He verbalizes understanding that he will need to follow up with his PCP for outpatient CT scan. Encouraged to continue PO intake. No acute distress noted. No overnight events noted. Denies any syncope, dizziness, shortness of breath, chest pain or tightness, nausea, vomiting, or diarrhea. Assessment & Plan: RILEY with CKD 3   - Baseline creatinine 1.4-1.5  - Improved today.   - FENa 0.8%.  Pre-Renal. Likely due to dehydration.   - Encouraged to continue to drink plenty of fluid   - Renal US done 10/10/20- Shows questionable mass versus prominent column of Mason in the midportion of left kidney   - CT Abd/Pelvis completed 10/11/20: Renal mass not identified but 3 phase CT would be necessary to exclude a mass  Nonobstructing right renal calculus  Incidental diverticulosis  - Due to elevated creatine will defer 3 phase CT scan to outpatient. Patient should follow up with PCP to have CT scheduled once creatinine stable. HTN  - Blood pressure stable. - Continue to hold Valsartan  - Restart Valsartan for SPB >150. DM   - Monitor blood glucose   - Continue insulin sliding scale     Hypokalemia   - Resolved     Code status: Full   DVT prophylaxis:     Care Plan discussed with: Patient/Family and Nurse  Anticipated Disposition: Per primary team   Anticipated Discharge: Per primary team     Thank you for allowing us to participate in patient's care. If you have any questions or need further assistance, please do not hesitate to contact us again. We will sign off. Hospital Problems  Date Reviewed: 8/5/2020          Codes Class Noted POA    Schizophrenia [facility]) ICD-10-CM: F20.9  ICD-9-CM: 295.90  10/6/2020 Unknown                Review of Systems:   A comprehensive review of systems was negative except for that written in the HPI. Vital Signs:    Last 24hrs VS reviewed since prior progress note. Most recent are:  Visit Vitals  /81   Pulse 86   Temp 98.5 °F (36.9 °C)   Resp 14   Ht 6' (1.829 m)   Wt 82.3 kg (181 lb 8 oz)   SpO2 97%   BMI 24.62 kg/m²       No intake or output data in the 24 hours ending 10/11/20 1609     Physical Examination:             Constitutional:  No acute distress, cooperative, pleasant, answers questions    ENT:  Oral mucosa moist, oropharynx benign. Resp:  CTA bilaterally. On room air. No wheezing/rhonchi/rales. No accessory muscle use   CV:  Regular rhythm, normal rate, no murmurs, gallops, rubs    GI:  Soft, non distended, non tender. normoactive bowel sounds, no hepatosplenomegaly     Musculoskeletal:  No edema, warm, 2+ pulses throughout    Neurologic:  Moves all extremities. AAOx3, CN II-XII reviewed. Follows commands     Psych:  Not anxious or agitated   Skin:  Good turgor, no rashes or ulcers       Data Review:    Review and/or order of clinical lab test  Review and/or order of tests in the radiology section of CPT  Review and/or order of tests in the medicine section of CPT      Labs:   No results for input(s): WBC, HGB, HCT, PLT, HGBEXT, HCTEXT, PLTEXT in the last 72 hours. Recent Labs     10/11/20  0626 10/09/20  1410    139   K 3.5 3.4*    104   CO2 28 28   BUN 18 29*   CREA 1.24 2.44*   GLU 98 113*   CA 9.8 9.7   MG 1.2*  --    PHOS 3.9  --      Recent Labs     10/11/20  0626   ALT 26   AP 60   TBILI 0.7   TP 7.2   ALB 3.7   GLOB 3.5     No results for input(s): INR, PTP, APTT, INREXT in the last 72 hours. No results for input(s): FE, TIBC, PSAT, FERR in the last 72 hours. No results found for: FOL, RBCF   No results for input(s): PH, PCO2, PO2 in the last 72 hours. No results for input(s): CPK, CKNDX, TROIQ in the last 72 hours.     No lab exists for component: CPKMB  Lab Results   Component Value Date/Time    Cholesterol, total 158 09/26/2020 06:18 AM    HDL Cholesterol 45 09/26/2020 06:18 AM    LDL, calculated 73 09/26/2020 06:18 AM    Triglyceride 200 (H) 09/26/2020 06:18 AM    CHOL/HDL Ratio 3.5 09/26/2020 06:18 AM     Lab Results   Component Value Date/Time    Glucose (POC) 105 (H) 10/11/2020 11:42 AM    Glucose (POC) 116 (H) 10/11/2020 07:27 AM    Glucose (POC) 110 (H) 10/10/2020 08:30 PM    Glucose (POC) 98 10/10/2020 04:26 PM    Glucose (POC) 111 (H) 09/29/2020 07:21 AM     Lab Results   Component Value Date/Time    Color YELLOW/STRAW 10/10/2020 03:01 PM    Appearance CLEAR 10/10/2020 03:01 PM    Specific gravity 1.014 10/10/2020 03:01 PM    Specific gravity >1.030 (H) 10/06/2020 04:19 AM    pH (UA) 5.0 10/10/2020 03:01 PM    Protein Negative 10/10/2020 03:01 PM    Glucose Negative 10/10/2020 03:01 PM    Ketone Negative 10/10/2020 03:01 PM    Bilirubin Negative 10/10/2020 03:01 PM Urobilinogen 0.2 10/10/2020 03:01 PM    Nitrites Negative 10/10/2020 03:01 PM    Leukocyte Esterase Negative 10/10/2020 03:01 PM    Epithelial cells FEW 10/10/2020 03:01 PM    Bacteria Negative 10/10/2020 03:01 PM    WBC 0-4 10/10/2020 03:01 PM    RBC 0-5 10/10/2020 03:01 PM         Medications Reviewed:     Current Facility-Administered Medications   Medication Dose Route Frequency    [START ON 10/12/2020] sertraline (ZOLOFT) tablet 150 mg  150 mg Oral DAILY    glucose chewable tablet 16 g  4 Tab Oral PRN    glucagon (GLUCAGEN) injection 1 mg  1 mg IntraMUSCular PRN    dextrose 10% infusion 0-250 mL  0-250 mL IntraVENous PRN    insulin lispro (HUMALOG) injection   SubCUTAneous AC&HS    ARIPiprazole (ABILIFY) tablet 20 mg  20 mg Oral DAILY    atorvastatin (LIPITOR) tablet 10 mg  10 mg Oral DAILY    gabapentin (NEURONTIN) capsule 300 mg  300 mg Oral TID    OLANZapine (ZyPREXA) tablet 5 mg  5 mg Oral Q6H PRN    haloperidol lactate (HALDOL) injection 5 mg  5 mg IntraMUSCular Q6H PRN    benztropine (COGENTIN) tablet 1 mg  1 mg Oral BID PRN    diphenhydrAMINE (BENADRYL) injection 50 mg  50 mg IntraMUSCular BID PRN    hydrOXYzine HCL (ATARAX) tablet 50 mg  50 mg Oral TID PRN    LORazepam (ATIVAN) injection 1 mg  1 mg IntraMUSCular Q4H PRN    traZODone (DESYREL) tablet 50 mg  50 mg Oral QHS PRN    acetaminophen (TYLENOL) tablet 650 mg  650 mg Oral Q4H PRN    magnesium hydroxide (MILK OF MAGNESIA) 400 mg/5 mL oral suspension 30 mL  30 mL Oral DAILY PRN     ______________________________________________________________________  EXPECTED LENGTH OF STAY: - - -  ACTUAL LENGTH OF STAY:          5                 Joaquin Yeager NP

## 2020-10-11 NOTE — PROGRESS NOTES
1550- 1000 mL oral fluid in take    Problem: Depressed Mood (Adult/Pediatric)  Goal: *STG: Participates in treatment plan  Outcome: Progressing Towards Goal     Problem: Depressed Mood (Adult/Pediatric)  Goal: *STG: Attends activities and groups  Outcome: Progressing Towards Goal     Problem: Depressed Mood (Adult/Pediatric)  Goal: *STG: Remains safe in hospital  Outcome: Progressing Towards Goal     Problem: Depressed Mood (Adult/Pediatric)  Goal: *STG: Complies with medication therapy  Outcome: Progressing Towards Goal     Problem: Depressed Mood (Adult/Pediatric)  Goal: Interventions  Outcome: Progressing Towards Goal  Note: Calm cooperative pleasant. Medication meal compliant. Depressed mood. Engaged.     oral fluids are encouraged (see MD order and note from 11/10/2020)

## 2020-10-11 NOTE — BH NOTES
Chief Complaint:  I am good today. Length of Stay: 5 Days    Interval History:  Mr. Brandyn Singh is better today. Says he slept well last night and his mood is better. He has not had any AH or VH today and denies any SI or plan. His renal functions are back to normal today and he has improved his oral intake. Denies any adverse events from his medications. Says he still feels depressed. Past Medical History:  Past Medical History:   Diagnosis Date    Alcohol abuse     Chronic pain     [facility] ([facility] [address]     Diabetes ([facility]     Hepatitis C     Hypertension     Psychiatric disorder     Depression & Schizoaffective Disorder           Labs:  Lab Results   Component Value Date/Time    WBC 8.0 10/06/2020 04:20 AM    HGB 13.6 10/06/2020 04:20 AM    HCT 40.2 10/06/2020 04:20 AM    PLATELET 237 05/21/5636 04:20 AM    MCV 90.3 10/06/2020 04:20 AM      Lab Results   Component Value Date/Time    Sodium 136 10/11/2020 06:26 AM    Potassium 3.5 10/11/2020 06:26 AM    Chloride 101 10/11/2020 06:26 AM    CO2 28 10/11/2020 06:26 AM    Anion gap 7 10/11/2020 06:26 AM    Glucose 98 10/11/2020 06:26 AM    Glucose 102 (H) 09/26/2020 06:18 AM    BUN 18 10/11/2020 06:26 AM    Creatinine 1.24 10/11/2020 06:26 AM    BUN/Creatinine ratio 15 10/11/2020 06:26 AM    GFR est AA >60 10/11/2020 06:26 AM    GFR est non-AA 60 (L) 10/11/2020 06:26 AM    Calcium 9.8 10/11/2020 06:26 AM    Bilirubin, total 0.7 10/11/2020 06:26 AM    Alk.  phosphatase 60 10/11/2020 06:26 AM    Protein, total 7.2 10/11/2020 06:26 AM    Albumin 3.7 10/11/2020 06:26 AM    Globulin 3.5 10/11/2020 06:26 AM    A-G Ratio 1.1 10/11/2020 06:26 AM    ALT (SGPT) 26 10/11/2020 06:26 AM      Vitals:    10/10/20 1955 10/11/20 0701 10/11/20 0849 10/11/20 1119   BP: 123/87 125/63  110/73   Pulse: 98 87  86   Resp: 16 16  16   Temp: 97.6 °F (36.4 °C) 97.3 °F (36.3 °C)     SpO2: 97% 99%  95%   Weight:   82.3 kg (181 lb 8 oz)    Height:             Current Facility-Administered Medications   Medication Dose Route Frequency Provider Last Rate Last Dose    [START ON 10/12/2020] sertraline (ZOLOFT) tablet 150 mg  150 mg Oral DAILY Fer Phelps MD        glucose chewable tablet 16 g  4 Tab Oral PRN Vamshi Botello MD        glucagon [facility]) injection 1 mg  1 mg IntraMUSCular PRN Vamshi Botello MD        dextrose 10% infusion 0-250 mL  0-250 mL IntraVENous PRN Vamshi Botello MD        insulin lispro (HUMALOG) injection   SubCUTAneous AC&HS Vamshi Botello MD   Stopped at 10/10/20 1630    ARIPiprazole (ABILIFY) tablet 20 mg  20 mg Oral DAILY Fer Phelps MD   20 mg at 10/11/20 0902    atorvastatin (LIPITOR) tablet 10 mg  10 mg Oral DAILY Fer Phelps MD   10 mg at 10/11/20 0902    gabapentin (NEURONTIN) capsule 300 mg  300 mg Oral TID Fer Phelps MD   300 mg at 10/11/20 0902    OLANZapine (ZyPREXA) tablet 5 mg  5 mg Oral Q6H PRN Mitzi Esquivel, NP   5 mg at 10/08/20 7615    haloperidol lactate (HALDOL) injection 5 mg  5 mg IntraMUSCular Q6H PRN Addie Esquivel Pod, NP        benztropine (COGENTIN) tablet 1 mg  1 mg Oral BID PRN Addie Esquivel Pod, NP        diphenhydrAMINE (BENADRYL) injection 50 mg  50 mg IntraMUSCular BID PRN Addie Esquivel Pod, NP        hydrOXYzine HCL (ATARAX) tablet 50 mg  50 mg Oral TID PRN Mitzi Esquivel, NP   50 mg at 10/09/20 1618    LORazepam (ATIVAN) injection 1 mg  1 mg IntraMUSCular Q4H PRN Addie Esquivel Pod, NP        traZODone (DESYREL) tablet 50 mg  50 mg Oral QHS PRN Addie Esquivel Pod, NP        acetaminophen (TYLENOL) tablet 650 mg  650 mg Oral Q4H PRN Addie Esquivel Pod, NP        magnesium hydroxide (MILK OF MAGNESIA) 400 mg/5 mL oral suspension 30 mL  30 mL Oral DAILY PRN Addie Esquivel Pod, NP             Mental Status Exam:  Eye contact: limited  Grooming: fair  Psychomotor activity: decreased  Speech is spontaneous  Mood is \"down\"  Affect: flat  Perception: AH + No CAH  Suicidal ideation: Denies any SI or plan. Cognition is grossly intact       Physical Exam:  Body habitus: Body mass index is 24.62 kg/m². Musculoskeletal system: normal gait  Tremor - neg  Cog wheeling - neg      Assessment and Plan:  Gladys Bellamy meets criteria for a diagnosis of Schizoaffective disorder bipolar type, cocaine use disorder, severe, alcohol use disorder, severe. Continue the medication regimen as prescribed  Disposition planning to continue and plan for discharge tomorrow. I certify that this patients inpatient psychiatric hospital services furnished since the previous certification were, and continue to be, required for treatment that could reasonably be expected to improve the patient's condition, or for diagnostic study, and that the patient continues to need, on a daily basis, active treatment furnished directly by or requiring the supervision of inpatient psychiatric facility personnel. In addition, the hospital records show that services furnished were intensive treatment services, admission or related services, or equivalent services.

## 2020-10-12 VITALS
HEART RATE: 85 BPM | BODY MASS INDEX: 24.58 KG/M2 | RESPIRATION RATE: 16 BRPM | OXYGEN SATURATION: 99 % | TEMPERATURE: 98.1 F | HEIGHT: 72 IN | DIASTOLIC BLOOD PRESSURE: 76 MMHG | WEIGHT: 181.5 LBS | SYSTOLIC BLOOD PRESSURE: 136 MMHG

## 2020-10-12 LAB
GLUCOSE BLD STRIP.AUTO-MCNC: 119 MG/DL (ref 65–100)
GLUCOSE BLD STRIP.AUTO-MCNC: 121 MG/DL (ref 65–100)
SERVICE CMNT-IMP: ABNORMAL
SERVICE CMNT-IMP: ABNORMAL

## 2020-10-12 PROCEDURE — 74011250637 HC RX REV CODE- 250/637: Performed by: PSYCHIATRY & NEUROLOGY

## 2020-10-12 PROCEDURE — 82962 GLUCOSE BLOOD TEST: CPT

## 2020-10-12 RX ORDER — ARIPIPRAZOLE 20 MG/1
20 TABLET ORAL DAILY
Qty: 30 TAB | Refills: 0 | Status: SHIPPED | OUTPATIENT
Start: 2020-10-13

## 2020-10-12 RX ORDER — SERTRALINE HYDROCHLORIDE 100 MG/1
150 TABLET, FILM COATED ORAL DAILY
Qty: 45 TAB | Refills: 0 | Status: SHIPPED | OUTPATIENT
Start: 2020-10-13

## 2020-10-12 RX ORDER — ARIPIPRAZOLE 20 MG/1
20 TABLET ORAL DAILY
Qty: 30 TAB | Refills: 0 | Status: SHIPPED | OUTPATIENT
Start: 2020-10-13 | End: 2020-10-12 | Stop reason: SDUPTHER

## 2020-10-12 RX ORDER — TRAZODONE HYDROCHLORIDE 50 MG/1
50 TABLET ORAL
Qty: 30 TAB | Refills: 0 | Status: SHIPPED | OUTPATIENT
Start: 2020-10-12

## 2020-10-12 RX ORDER — GABAPENTIN 300 MG/1
300 CAPSULE ORAL 3 TIMES DAILY
Qty: 90 CAP | Refills: 0 | Status: SHIPPED | OUTPATIENT
Start: 2020-10-12

## 2020-10-12 RX ADMIN — ATORVASTATIN CALCIUM 10 MG: 10 TABLET, FILM COATED ORAL at 09:10

## 2020-10-12 RX ADMIN — SERTRALINE HYDROCHLORIDE 150 MG: 50 TABLET ORAL at 09:10

## 2020-10-12 RX ADMIN — ARIPIPRAZOLE 20 MG: 10 TABLET ORAL at 09:10

## 2020-10-12 RX ADMIN — GABAPENTIN 300 MG: 300 CAPSULE ORAL at 09:10

## 2020-10-12 NOTE — PROGRESS NOTES
2300: Resting quietly in bed with eyes closed. No apparent distress. Respirations are even and unlabored. Staff will continue to monitor q15 throughout the shift. Problem: Falls - Risk of  Goal: *Absence of Falls  Description: Document Cam Gregg Fall Risk and appropriate interventions in the flowsheet.   Outcome: Progressing Towards Goal  Note: Fall Risk Interventions:            Medication Interventions: Teach patient to arise slowly

## 2020-10-12 NOTE — BH NOTES
Behavioral Health Transition Record to Provider    Patient Name: Wolfgang Mckeon  YOB: 1960  Medical Record Number: [id number]  Date of Admission: 10/6/2020  Date of Discharge: 10/12/2020    Attending Provider: Yang Locke MD  Discharging Provider: Leonides Gonzalez   To contact this individual call [phone] and ask the  to page. If unavailable, ask to be transferred to [address] [place] [address] Provider on call. [facility] Provider will be available on call 24/7 and during holidays. Primary Care Provider: Kathleen Rodriguez MD    Allergies   Allergen Reactions    Tramadol Nausea and Vomiting    Lisinopril Cough     Developed cough while taking lisinopril       Reason for Admission:   CHIEF COMPLAINT:  \"I feel terrible. \"     HISTORY OF PRESENT ILLNESS:  The patient is a 64-year-old [facility] American man who is well known to me from prior hospitalizations at [address] [place] [address] was discharged from my care on 09/29/2020, and was brought in by emergency medical services to the ED yesterday. Ayesha Clark is a poor historian and sat there throughout the whole process and answered just a few of my questions. Ayesha Clark keeps talking to himself periodically during the interview and was unable to provide much history.  According to the ER notes, he came into the ER and was intoxicated with a blood alcohol level of 80.  He had reported using three 24-ounce beers and also had reported using cocaine.  Urine drug screen was positive for this.  Says that he is having thoughts of homicide towards whoever god wants him to kill and also has thoughts of wanting to end his life and the voices keep telling him to do this. Ayesha Clark refused to elaborate any further. Ayesha Clark has a long history of malingering symptoms and staff note that his symptoms appear to be very dramatic and exaggerated at times. Ayesha Clark did not answer questions about compliance with his medications and does not remember what he did with them.       Admission Diagnosis: Schizophrenia ([facility].) [F20.9]    * No surgery found *    Results for orders placed or performed during the hospital encounter of 10/06/20   SARS-COV-2, PCR    Specimen: Nasopharyngeal   Result Value Ref Range    Specimen source Nasopharyngeal      SARS-CoV-2 Indeterminate (A) NOTD     SARS-COV-2, PCR    Specimen: Nasopharyngeal   Result Value Ref Range    Specimen source Nasopharyngeal      SARS-CoV-2 Not detected NOTD     URINALYSIS W/MICROSCOPIC   Result Value Ref Range    Color YELLOW/STRAW      Appearance CLEAR CLEAR      Specific gravity >1.030 (H) 1.003 - 1.030    pH (UA) 5.0 5.0 - 8.0      Protein Negative NEG mg/dL    Glucose Negative NEG mg/dL    Ketone Negative NEG mg/dL    Bilirubin Negative NEG      Blood MODERATE (A) NEG      Urobilinogen 0.2 0.2 - 1.0 EU/dL    Nitrites Negative NEG      Leukocyte Esterase Negative NEG      WBC 0-4 0 - 4 /hpf    RBC 0-5 0 - 5 /hpf    Epithelial cells FEW FEW /lpf    Bacteria Negative NEG /hpf   LIPASE   Result Value Ref Range    Lipase 126 73 - 393 U/L   CBC WITH AUTOMATED DIFF   Result Value Ref Range    WBC 8.0 4.1 - 11.1 K/uL    RBC 4.45 4.10 - 5.70 M/uL    HGB 13.6 12.1 - 17.0 g/dL    HCT 40.2 36.6 - 50.3 %    MCV 90.3 80.0 - 99.0 FL    MCH 30.6 26.0 - 34.0 PG    MCHC 33.8 30.0 - 36.5 g/dL    RDW 13.3 11.5 - 14.5 %    PLATELET 621 338 - 251 K/uL    MPV 10.0 8.9 - 12.9 FL    NRBC 0.0 0  WBC    ABSOLUTE NRBC 0.00 0.00 - 0.01 K/uL    NEUTROPHILS 54 32 - 75 %    LYMPHOCYTES 38 12 - 49 %    MONOCYTES 8 5 - 13 %    EOSINOPHILS 0 0 - 7 %    BASOPHILS 0 0 - 1 %    IMMATURE GRANULOCYTES 0 0.0 - 0.5 %    ABS. NEUTROPHILS 4.3 1.8 - 8.0 K/UL    ABS. LYMPHOCYTES 3.0 0.8 - 3.5 K/UL    ABS. MONOCYTES 0.7 0.0 - 1.0 K/UL    ABS. EOSINOPHILS 0.0 0.0 - 0.4 K/UL    ABS. BASOPHILS 0.0 0.0 - 0.1 K/UL    ABS. IMM.  GRANS. 0.0 0.00 - 0.04 K/UL    DF AUTOMATED     ETHYL ALCOHOL   Result Value Ref Range    ALCOHOL(ETHYL),SERUM 162 (H) <10 MG/DL   SAMPLES BEING HELD   Result Value Ref Range    SAMPLES BEING HELD  [address]     COMMENT        Add-on orders for these samples will be processed based on acceptable specimen integrity and analyte stability, which may vary by analyte. METABOLIC PANEL, COMPREHENSIVE   Result Value Ref Range    Sodium 135 (L) 136 - 145 mmol/L    Potassium 2.9 (L) 3.5 - 5.1 mmol/L    Chloride 98 97 - 108 mmol/L    CO2 25 21 - 32 mmol/L    Anion gap 12 5 - 15 mmol/L    Glucose 129 (H) 65 - 100 mg/dL    BUN 27 (H) 6 - 20 MG/DL    Creatinine 1.30 0.70 - 1.30 MG/DL    BUN/Creatinine ratio 21 (H) 12 - 20      GFR est AA >60 >60 ml/min/1.73m2    GFR est non-AA 57 (L) >60 ml/min/1.73m2    Calcium 9.7 8.5 - 10.1 MG/DL    Bilirubin, total 0.4 0.2 - 1.0 MG/DL    ALT (SGPT) 58 12 - 78 U/L    AST (SGOT) 102 (H) 15 - 37 U/L    Alk.  phosphatase 66 45 - 117 U/L    Protein, total 8.1 6.4 - 8.2 g/dL    Albumin 4.3 3.5 - 5.0 g/dL    Globulin 3.8 2.0 - 4.0 g/dL    A-G Ratio 1.1 1.1 - 2.2     SALICYLATE   Result Value Ref Range    Salicylate level 3.8 2.8 - 20.0 MG/DL   ACETAMINOPHEN   Result Value Ref Range    Acetaminophen level <2 (L) 10 - 30 ug/mL   DRUG SCREEN, URINE   Result Value Ref Range    AMPHETAMINES Negative NEG      BARBITURATES Negative NEG      BENZODIAZEPINES Negative NEG      COCAINE Positive (A) NEG      METHADONE Negative NEG      OPIATES Negative NEG      PCP(PHENCYCLIDINE) Negative NEG      THC (TH-CANNABINOL) Negative NEG      Drug screen comment (NOTE)    SARS-COV-2   Result Value Ref Range    Specimen source Nasopharyngeal      Specimen source Nasopharyngeal      COVID-19 rapid test Not detected NOTD      Specimen type NP Swab      Health status Symptomatic Testing     SARS-COV-2   Result Value Ref Range    Specimen source Nasopharyngeal      Specimen source Nasopharyngeal      COVID-19 rapid test Not detected NOTD      Specimen type NP Swab     METABOLIC PANEL, BASIC   Result Value Ref Range    Sodium 139 136 - 145 mmol/L    Potassium 3.4 (L) 3.5 - 5.1 mmol/L    Chloride 104 97 - 108 mmol/L    CO2 28 21 - 32 mmol/L    Anion gap 7 5 - 15 mmol/L    Glucose 113 (H) 65 - 100 mg/dL    BUN 29 (H) 6 - 20 MG/DL    Creatinine 2.44 (H) 0.70 - 1.30 MG/DL    BUN/Creatinine ratio 12 12 - 20      GFR est AA 33 (L) >60 ml/min/1.73m2    GFR est non-AA 27 (L) >60 ml/min/1.73m2    Calcium 9.7 8.5 - 10.1 MG/DL   URINALYSIS W/MICROSCOPIC   Result Value Ref Range    Color YELLOW/STRAW      Appearance CLEAR CLEAR      Specific gravity 1.014 1.003 - 1.030      pH (UA) 5.0 5.0 - 8.0      Protein Negative NEG mg/dL    Glucose Negative NEG mg/dL    Ketone Negative NEG mg/dL    Bilirubin Negative NEG      Blood Negative NEG      Urobilinogen 0.2 0.2 - 1.0 EU/dL    Nitrites Negative NEG      Leukocyte Esterase Negative NEG      WBC 0-4 0 - 4 /hpf    RBC 0-5 0 - 5 /hpf    Epithelial cells FEW FEW /lpf    Bacteria Negative NEG /hpf    Hyaline cast 0-2 0 - 5 /lpf   SODIUM, UR, RANDOM   Result Value Ref Range    Sodium,urine random 54 MMOL/L   CREATININE, UR, RANDOM   Result Value Ref Range    Creatinine, urine 985.94 mg/dL   METABOLIC PANEL, COMPREHENSIVE   Result Value Ref Range    Sodium 136 136 - 145 mmol/L    Potassium 3.5 3.5 - 5.1 mmol/L    Chloride 101 97 - 108 mmol/L    CO2 28 21 - 32 mmol/L    Anion gap 7 5 - 15 mmol/L    Glucose 98 65 - 100 mg/dL    BUN 18 6 - 20 MG/DL    Creatinine 1.24 0.70 - 1.30 MG/DL    BUN/Creatinine ratio 15 12 - 20      GFR est AA >60 >60 ml/min/1.73m2    GFR est non-AA 60 (L) >60 ml/min/1.73m2    Calcium 9.8 8.5 - 10.1 MG/DL    Bilirubin, total 0.7 0.2 - 1.0 MG/DL    ALT (SGPT) 26 12 - 78 U/L    AST (SGOT) 14 (L) 15 - 37 U/L    Alk.  phosphatase 60 45 - 117 U/L    Protein, total 7.2 6.4 - 8.2 g/dL    Albumin 3.7 3.5 - 5.0 g/dL    Globulin 3.5 2.0 - 4.0 g/dL    A-G Ratio 1.1 1.1 - 2.2     MAGNESIUM   Result Value Ref Range    Magnesium 1.2 (L) 1.6 - 2.4 mg/dL   PHOSPHORUS   Result Value Ref Range    Phosphorus 3.9 2.6 - 4.7 MG/DL   GLUCOSE, POC   Result Value Ref Range    Glucose (POC) 98 65 - 100 mg/dL    Performed by Leonel Gore    GLUCOSE, POC   Result Value Ref Range    Glucose (POC) 110 (H) 65 - 100 mg/dL    Performed by Jose Roasrio    GLUCOSE, POC   Result Value Ref Range    Glucose (POC) 116 (H) 65 - 100 mg/dL    Performed by HITESH DUPONT (CON)    GLUCOSE, POC   Result Value Ref Range    Glucose (POC) 105 (H) 65 - 100 mg/dL    Performed by HITESH DUPONT (CON)    GLUCOSE, POC   Result Value Ref Range    Glucose (POC) 102 (H) 65 - 100 mg/dL    Performed by Giselle Conn    GLUCOSE, POC   Result Value Ref Range    Glucose (POC) 127 (H) 65 - 100 mg/dL    Performed by Caesar JOHNSON    GLUCOSE, POC   Result Value Ref Range    Glucose (POC) 121 (H) 65 - 100 mg/dL    Performed by HITESH DUPONT (CON)    GLUCOSE, POC   Result Value Ref Range    Glucose (POC) 119 (H) 65 - 100 mg/dL    Performed by HITESH DUPONT (CON)    EKG, 12 LEAD, INITIAL   Result Value Ref Range    Ventricular Rate 87 BPM    Atrial Rate 87 BPM    P-R Interval 150 ms    QRS Duration 128 ms    Q-T Interval 414 ms    QTC Calculation (Bezet) 498 ms    Calculated P Axis 64 degrees    Calculated R Axis -83 degrees    Calculated T Axis 52 degrees    Diagnosis       Sinus rhythm with premature atrial complexes  Right bundle branch block  Left anterior fascicular block  ** Bifascicular block **  Septal infarct , age undetermined  When compared with ECG of 25-SEP-2020 07:17,  Septal infarct is now present  Confirmed by Melissa Demarco MD. ([id number]) on 10/6/2020 11:57:20 AM     EKG, 12 LEAD, INITIAL   Result Value Ref Range    Ventricular Rate 74 BPM    Atrial Rate 74 BPM    P-R Interval 134 ms    QRS Duration 122 ms    Q-T Interval 432 ms    QTC Calculation (Bezet) 479 ms    Calculated P Axis 70 degrees    Calculated R Axis -83 degrees    Calculated T Axis 42 degrees    Diagnosis       Normal sinus rhythm  Right bundle branch block  Left anterior fascicular block  ** Bifascicular block **  When compared with ECG of 06-OCT-2020 06:35,  premature atrial complexes are no longer present  Criteria for Septal infarct are no longer present  Nonspecific T wave abnormality no longer evident in Anterior leads  Confirmed by Ioana Dimas MD. ([id number]) on 10/9/2020 12:34:51 AM         Immunizations administered during this encounter:   Immunization History   Administered Date(s) Administered    Influenza Vaccine PF 12/02/2014    Pneumococcal Polysaccharide (PPSV-23) 12/02/2014       Screening for Metabolic Disorders for Patients on Antipsychotic Medications  (Data obtained from the EMR)    Estimated Body Mass Index  Estimated body mass index is 24.62 kg/m² as calculated from the following:    Height as of this encounter: 6' (1.829 m). Weight as of this encounter: 82.3 kg (181 lb 8 oz). Vital Signs/Blood Pressure  Visit Vitals  /76 (BP 1 Location: Left arm, BP Patient Position: Sitting)   Pulse 85   Temp 98.1 °F (36.7 °C)   Resp 16   Ht 6' (1.829 m)   Wt 82.3 kg (181 lb 8 oz)   SpO2 99%   BMI 24.62 kg/m²       Blood Glucose/Hemoglobin A1c  Lab Results   Component Value Date/Time    Glucose 98 10/11/2020 06:26 AM    Glucose 102 (H) 09/26/2020 06:18 AM    Glucose (POC) 119 (H) 10/12/2020 11:17 AM       Lab Results   Component Value Date/Time    Hemoglobin A1c 6.7 (H) 06/12/2020 03:53 PM        Lipid Panel  Lab Results   Component Value Date/Time    Cholesterol, total 158 09/26/2020 06:18 AM    HDL Cholesterol 45 09/26/2020 06:18 AM    LDL, calculated 73 09/26/2020 06:18 AM    Triglyceride 200 (H) 09/26/2020 06:18 AM    CHOL/HDL Ratio 3.5 09/26/2020 06:18 AM        Discharge Diagnosis: Schizoaffective disorder bipolar type, cocaine use disorder, severe, alcohol use disorder, severe. Discharge Plan: The patient Anola Bur exhibits the ability to control behavior in a less restrictive environment. Patient's level of functioning is improving.   No assaultive/destructive behavior has been observed for the past 24 hours. No suicidal/homicidal threat or behavior has been observed for the past 24 hours. There is no evidence of serious medication side effects. Patient has not been in physical or protective restraints for at least the past 24 hours.     If weapons involved, how are they secured? No weapons involved      Is patient aware of and in agreement with discharge plan? He is aware of discharge and is in agreement      Arrangements for medication:  Prescriptions given to patient, Abilify filled at [facility].      Copy of discharge instructions to provider?:  Yes, send to the [facility] and to [id number] for transportation home:  To [facility] all follow up appointments as scheduled, continue to take prescribed medications per physician instructions. Mental health crisis number:  528 or your local mental health crisis line number at [address] [facility] suicide Safety Plan is a document that supports someone when they are having thoughts of suicide.     Warning Signs that indicate a suicidal crisis may be developing: What (situations, thoughts, feelings, body sensations, behaviors, etc.) do you experience that lets you know you are beginning to think about suicide? 1. Thoughts of harming self   2. Thoughts of harming others  3.      Internal Coping Strategies:  What things can I do (relaxation techniques, hobbies, physical activities, etc.) to take my mind off my problems without contacting another person? 1. Watch TV  2.  3.     People and social settings that provide distraction: Who can I call or where can I go to distract me? 1. Name: Nobody        People whom I can ask for help: Who can I call when I need help - for example, friends, family, clergy, someone else? 1.  Name: Nobody              Professionals or Behavioral Health agencies I can contact during a crisis: Who can I call for help - for example, my doctor, my psychiatrist, my psychologist, a mental health provider, a suicide hotline? 1. Clinician Name: Patient states no clinician        3. Suicide Prevention Lifeline: 1-500-217-TALK (4683)     4. Local Behavioral Health Emergency Services -  for example, [facility] suicide hotline, [place] Hotline:  883-2527 (Mental Health Crisis line)      Emergency Services Address: [facility]- [address], [address]      Emergency Services Phone: [phone]     Making the environment safe: How can I make my environment (house/apartment/living space) safer? For example, can I remove guns, medications, and other items? 1. Patient homeless, not able to make living environment safer.       Discharge Medication List and Instructions:   Current Discharge Medication List      START taking these medications    Details   traZODone (DESYREL) 50 mg tablet Take 1 Tab by mouth nightly as needed for Sleep (For insomnia). Indications: insomnia associated with depression  Qty: 30 Tab, Refills: 0      ARIPiprazole (ABILIFY) 20 mg tablet Take 1 Tab by mouth daily. Indications: bipolar I disorder with most recent episode mixed  Qty: 30 Tab, Refills: 0         CONTINUE these medications which have CHANGED    Details   gabapentin (NEURONTIN) 300 mg capsule Take 1 Cap by mouth three (3) times daily. Max Daily Amount: 900 mg. Indications: neuropathic pain  Qty: 90 Cap, Refills: 0    Associated Diagnoses: Alcohol abuse      sertraline (ZOLOFT) 100 mg tablet Take 1.5 Tabs by mouth daily. Indications: anxiousness associated with depression  Qty: 45 Tab, Refills: 0         CONTINUE these medications which have NOT CHANGED    Details   valsartan (DIOVAN) 160 mg tablet Take 1 Tab by mouth daily. Indications: high blood pressure  Qty: 14 Tab, Refills: 0      metFORMIN (GLUCOPHAGE) 1,000 mg tablet Take 1 Tab by mouth two (2) times daily (with meals).  Indications: type 2 diabetes mellitus  Qty: 180 Tab, Refills: 3    Comments: snf pt  Associated Diagnoses: Controlled type 2 diabetes mellitus without complication, without long-term current use of insulin (HCC)      atorvastatin (LIPITOR) 10 mg tablet Take 1 Tab by mouth daily. Indications: prevent stroke  Qty: 90 Tab, Refills: 3    Associated Diagnoses: Mixed hyperlipidemia         STOP taking these medications       lurasidone (LATUDA) 80 mg tab tablet Comments:   Reason for Stopping:               Unresulted Labs (24h ago, onward)    None        To obtain results of studies pending at discharge, please contact [phone]    Follow-up Information     Follow up With Specialties Details Why [address] [place] [address]  Call on 10/9/2020 please call [phone] to make a same day access appointment  [address] [place] [address]  [phone]    [facility]  Go on 10/9/2020 Initial mental health assessment:  In response to COVID-19 we are currently unable to provide walk-in appointments. If you wish to initiate services by coming into the agency you should sign in at the registration desk between 8:00am and 4:00pm.   [address], [address] [place] [address], [address]  [phone]            Advanced Directive:   Does the patient have an appointed surrogate decision maker? No  Does the patient have a Medical Advance Directive? No  Does the patient have a Psychiatric Advance Directive? No  If the patient does not have a surrogate or Medical Advance Directive AND Psychiatric Advance Directive, the patient was offered information on these advance directives Patient declined to complete    Patient Instructions: Please continue all medications until otherwise directed by physician. Tobacco Cessation Discharge Plan:   Is the patient a smoker and needs referral for smoking cessation? Yes  Patient referred to the following for smoking cessation with an appointment?  Refused     Patient was offered medication to assist with smoking cessation at discharge? Refused  Was education for smoking cessation added to the discharge instructions? Yes    Alcohol/Substance Abuse Discharge Plan:   Does the patient have a history of substance/alcohol abuse and requires a referral for treatment? Yes  Patient referred to the following for substance/alcohol abuse treatment with an appointment? Refused   Patient was offered medication to assist with alcohol cessation at discharge? Refused  Was education for substance/alcohol abuse added to discharge instructions? Refused    Patient discharged to Home; discussed with patient/caregiver and provided to the patient/caregiver either in hard copy or electronically.

## 2020-10-12 NOTE — PROGRESS NOTES
Problem: Depressed Mood (Adult/Pediatric)  Goal: *STG: Participates in treatment plan  Outcome: Progressing Towards Goal  Variance Patient slowly responding  Pt is irritable, but complies with meds and refrains from any acting out behaviors.

## 2020-10-12 NOTE — INTERDISCIPLINARY ROUNDS
Behavioral Health Interdisciplinary Rounds Patient Name: Rafael Mahmood  Age: 62 y.o. Room/Bed:  736/02 Primary Diagnosis: <principal problem not specified> Admission Status: Voluntary Readmission within 30 days: no 
Power of  in place: no 
Patient requires a blocked bed: no          Reason for blocked bed: VTE Prophylaxis: No 
 
Mobility needs/Fall risk: no 
Flu Vaccine : no  
Nutritional Plan: no 
Consults:         
Labs/Testing due today?: no 
 
Sleep hours:       
Participation in Care/Groups:  yes Medication Compliant?: Yes PRNS (last 24 hours): None Restraints (last 24 hours):  no 
  
CIWA (range last 24 hours): COWS (range last 24 hours): Alcohol screening (AUDIT) completed -   AUDIT Score: 0 If applicable, date SBIRT discussed in treatment team AND documented:  
AUDIT Screen Score: AUDIT Score: 0 Document Brief Intervention (corresponds directly with the 5 A's, Ask, Advise, Assess, Assist, and Arrange): At- Risk Patients (Score 7-15 for women; 8-15 for men) Discuss concern patient is drinking at unhealthy levels known to increase risk of alcohol-related health problems. Is Patient ready to commit to change? If No: 
? Encourage reflection ? Discuss short term and long term health risks of consuming alcohol ? Barriers to change ? Reaffirm willingness to help / Educational materials provided If Yes: 
? Set goal 
? Plan 
? Educational materials provided Harmful use or Dependence (Score 16 or greater) ? Discuss short term and long term health risks of consuming alcohol ? Recommendations ? Negotiate drinking goal 
? Recommend addiction specialist/center ? Arrange follow-up appointments. Tobacco - patient is a smoker: Have You Used Tobacco in the Past 30 Days: Yes Illegal Drugs use: Have You Used Any Illegal Substances Over the Past 12 Months: Yes 
 
24 hour chart check complete: yes Patient goal(s) for today: prepare for discharge Treatment team focus/goals: reconcile medications and facilitate discharge Progress note: Pt is alert, oriented, clam, cooperative and psychiatrically stable for discharge. LOS:  6  Expected LOS:  
 
Financial concerns/prescription Karen Ling 
Family contact:  No contact                             
Family requesting physician contact today:   
Discharge plan: Will be discharge to [address] Access to weapons :[phone] 
Outpatient provider(s): refer to [facility]  
Patient's preferred phone number for follow up call :  
  
 
Participating treatment team members: Keyla Watson, Ana M Block MSW; Dr. Katie Bliss; Gricel De Jesus, PharmD; Angelique Martinez, RN.

## 2020-10-12 NOTE — DISCHARGE INSTRUCTIONS
DISCHARGE SUMMARY    NAME:Go Aragon  : 1960  MRN: [id number]    The patient Fara Ortega exhibits the ability to control behavior in a less restrictive environment. Patient's level of functioning is improving. No assaultive/destructive behavior has been observed for the past 24 hours. No suicidal/homicidal threat or behavior has been observed for the past 24 hours. There is no evidence of serious medication side effects. Patient has not been in physical or protective restraints for at least the past 24 hours. If weapons involved, how are they secured? No weapons involved     Is patient aware of and in agreement with discharge plan? He is aware of discharge and is in agreement     Arrangements for medication:  Prescriptions given to patient, Abilify filled at [facility]. Copy of discharge instructions to provider?:  Yes, send to the [facility] and to [address] [place] [address] Road for transportation home:  To [address] all follow up appointments as scheduled, continue to take prescribed medications per physician instructions. Mental health crisis number:  [phone] or your local mental health crisis line number at [facility] from Nurse    *  Please give a list of your current medications to your Primary Care Provider. *  Please update this list whenever your medications are discontinued, doses are      changed, or new medications (including over-the-counter products) are added. *  Please carry medication information at all times in case of emergency situations. These are general instructions for a healthy lifestyle:    No smoking/ No tobacco products/ Avoid exposure to second hand smoke  Surgeon General's Warning:  Quitting smoking now greatly reduces serious risk to your health.     Obesity, smoking, and sedentary lifestyle greatly increases your risk for illness    A healthy diet, regular physical exercise & weight monitoring are important for maintaining a healthy lifestyle    The discharge information has been reviewed with the patient. The patient verbalized understanding. Discharge medications reviewed with the patient and appropriate educational materials and side effects teaching were provided. ___________________________________________________________________________________________________________________________________  SAFETY PLAN     A suicide Safety Plan is a document that supports someone when they are having thoughts of suicide.     Warning Signs that indicate a suicidal crisis may be developing: What (situations, thoughts, feelings, body sensations, behaviors, etc.) do you experience that lets you know you are beginning to think about suicide? 1. Thoughts of harming self   2. Thoughts of harming others  3.      Internal Coping Strategies:  What things can I do (relaxation techniques, hobbies, physical activities, etc.) to take my mind off my problems without contacting another person? 1. Watch TV  2.  3.     People and social settings that provide distraction: Who can I call or where can I go to distract me? 1. Name: Nobody        People whom I can ask for help: Who can I call when I need help - for example, friends, family, clergy, someone else? 1. Name: Nobody              Professionals or [address] [place] [address] I can contact during a crisis: Who can I call for help - for example, my doctor, my psychiatrist, my psychologist, a mental health provider, a suicide hotline? 1. Clinician Name: Patient states no clinician        3. Suicide Prevention Lifeline: 4-663-957-HVYV (4985)     4. Local Behavioral Health Emergency Services -  for example, [address] suicide hotline, [facility] Hotline:  544-9290 (Mental Health Crisis line)      Emergency Services Address: [facility]- [address], [address]      Emergency Services Phone: [phone]     Making the environment safe:  How can I make my environment (house/apartment/living space) safer? For example, can I remove guns, medications, and other items? 1. Patient homeless, not able to make living environment safer.                 Information for Homeless Point of Entry   [facility] partners with homeless services providers in the [address] [place] [address], the [employer] [facility], and public and private agencies to assist households resolve their housing crisis as quickly as possible. Those in crisis can contact [facility] to discuss the situation and to be connected with available resources. The [address] is open Monday - Friday from 8:30 am - 5:00 pm. Assessments are conducted until 4:00 pm.   [address] [facility] [phone]  If you need shelter in the [facility] area, contact the [address]: [phone]. The [address] facilitates access to resources and shelter alternatives for those who are three days or less away from losing their housing. This line is also available for those already experiencing homelessness. Daily [place] [address] provides mental health counseling (one-on-one and group), substance use counseling, and psychiatric medication management and evaluation. A list of [facility] behavioral health departments exclusionary practices is available here. Daily [address] Hours: Monday-Friday 8:00am--4:30pm (no patients are seen between 12:00pm and 1:00pm). To receive Behavioral Health Services   Initial mental health assessment:  In response to COVID-19 we are currently unable to provide walk-in appointments. If you wish to initiate services by coming into the agency you should sign in at the registration desk between 8:00am and 4:00pm.  Once registration is completed you will be provided contact information to schedule the assessment.   If you prefer to register by phone you can call [phone] to initiate services. [facility] appointments are scheduled at the time of the initial intake (after initial mental health assessment is completed). New clients will not be receiving medication the same day as initial appointments. Established clients who have already had a mental health intake within the last year can call [phone]. [phone] to schedule an appointment. Medication Assisted Therapy (MAT) Program  More information about our MAT Program to assist those with Opioid Use Disorder is available here.

## 2020-10-12 NOTE — PROGRESS NOTES
Pharmacist Discharge Medication Reconciliation    Discharging Provider: Dr. Falguni Trujillo PMH:   Past Medical History:   Diagnosis Date    Alcohol abuse     Chronic pain     Cocaine abuse ([facility]     Diabetes ([facility]     Hepatitis C     Hypertension     Psychiatric disorder     Depression & Schizoaffective Disorder     Chief Complaint for this Admission:   Chief Complaint   Patient presents with    Mental Health Problem     Allergies: Tramadol and Lisinopril    Discharge Medications:   Current Discharge Medication List        START taking these medications    Details   traZODone (DESYREL) 50 mg tablet Take 1 Tab by mouth nightly as needed for Sleep (For insomnia). Indications: insomnia associated with depression  Qty: 30 Tab, Refills: 0      ARIPiprazole (ABILIFY) 20 mg tablet Take 1 Tab by mouth daily. Indications: bipolar I disorder with most recent episode mixed  Qty: 30 Tab, Refills: 0           CONTINUE these medications which have CHANGED    Details   gabapentin (NEURONTIN) 300 mg capsule Take 1 Cap by mouth three (3) times daily. Max Daily Amount: 900 mg. Indications: neuropathic pain  Qty: 90 Cap, Refills: 0    Associated Diagnoses: Alcohol abuse      sertraline (ZOLOFT) 100 mg tablet Take 1.5 Tabs by mouth daily. Indications: anxiousness associated with depression  Qty: 45 Tab, Refills: 0           CONTINUE these medications which have NOT CHANGED    Details   valsartan (DIOVAN) 160 mg tablet Take 1 Tab by mouth daily. Indications: high blood pressure  Qty: 14 Tab, Refills: 0      metFORMIN (GLUCOPHAGE) 1,000 mg tablet Take 1 Tab by mouth two (2) times daily (with meals). Indications: type 2 diabetes mellitus  Qty: 180 Tab, Refills: 3    Comments: custodial pt  Associated Diagnoses: Controlled type 2 diabetes mellitus without complication, without long-term current use of insulin ([facility])      atorvastatin (LIPITOR) 10 mg tablet Take 1 Tab by mouth daily.  Indications: prevent stroke  Qty: 90 Tab, Refills: 3 Associated Diagnoses: Mixed hyperlipidemia           STOP taking these medications       lurasidone (LATUDA) 80 mg tab tablet Comments:   Reason for Stopping:             The patient's chart, MAR and AVS were reviewed by LUNA LalaD.

## 2020-10-12 NOTE — PROGRESS NOTES
Problem: Depressed Mood (Adult/Pediatric)  Goal: *STG: Participates in treatment plan  Outcome: Progressing Towards Goal  Note: Patient up and out on unit with others. Affect is flat. Denies SI. Patients goal is to find housing and discharge. Staff goal is to teach patient importance of medication compliance.    Goal: *STG: Verbalizes anger, guilt, and other feelings in a constructive manor  Outcome: Progressing Towards Goal  Goal: *STG: Attends activities and groups  Outcome: Progressing Towards Goal  Goal: *STG: Demonstrates reduction in symptoms and increase in insight into coping skills/future focused  Outcome: Progressing Towards Goal  Goal: *STG: Remains safe in hospital  Outcome: Progressing Towards Goal  Goal: *STG: Complies with medication therapy  Outcome: Progressing Towards Goal  Goal: Interventions  Outcome: Progressing Towards Goal

## 2020-10-14 NOTE — PROGRESS NOTES
Reached out to patient at [phone] for follow up. Patient's voicemail came on and no message left due to confidentiality.

## 2020-10-19 ENCOUNTER — PATIENT OUTREACH (OUTPATIENT)
Dept: CASE MANAGEMENT | Age: 60
End: 2020-10-19

## 2020-10-20 NOTE — PROGRESS NOTES
ACM attempted to follow up with patient after recent hospital admission to provide education regarding COVID-19 symptoms and precautions and offer case management services. Attempts to reach patient were unsuccessful at all available numbers. Episode of care will be resolved.    Trina Rooney RN  Ambulatory Care Manager

## 2021-05-06 LAB — HEMOGLOBIN A1C: 7.8 %

## 2021-08-03 PROBLEM — N17.9 AKI (ACUTE KIDNEY INJURY) (HCC): Status: RESOLVED | Noted: 2020-08-05 | Resolved: 2021-08-03
